# Patient Record
Sex: MALE | Race: WHITE | NOT HISPANIC OR LATINO | Employment: FULL TIME | URBAN - METROPOLITAN AREA
[De-identification: names, ages, dates, MRNs, and addresses within clinical notes are randomized per-mention and may not be internally consistent; named-entity substitution may affect disease eponyms.]

---

## 2017-01-09 ENCOUNTER — TRANSFERRED RECORDS (OUTPATIENT)
Dept: HEALTH INFORMATION MANAGEMENT | Facility: CLINIC | Age: 62
End: 2017-01-09

## 2017-01-23 ENCOUNTER — OFFICE VISIT (OUTPATIENT)
Dept: FAMILY MEDICINE | Facility: CLINIC | Age: 62
End: 2017-01-23
Payer: COMMERCIAL

## 2017-01-23 VITALS
HEART RATE: 84 BPM | WEIGHT: 136 LBS | HEIGHT: 68 IN | OXYGEN SATURATION: 99 % | DIASTOLIC BLOOD PRESSURE: 76 MMHG | SYSTOLIC BLOOD PRESSURE: 118 MMHG | BODY MASS INDEX: 20.61 KG/M2 | TEMPERATURE: 97.6 F

## 2017-01-23 DIAGNOSIS — Z12.5 SCREENING FOR PROSTATE CANCER: ICD-10-CM

## 2017-01-23 DIAGNOSIS — E11.9 TYPE 2 DIABETES MELLITUS WITHOUT COMPLICATION, UNSPECIFIED LONG TERM INSULIN USE STATUS: ICD-10-CM

## 2017-01-23 DIAGNOSIS — Z23 NEED FOR PROPHYLACTIC VACCINATION AGAINST STREPTOCOCCUS PNEUMONIAE (PNEUMOCOCCUS): ICD-10-CM

## 2017-01-23 DIAGNOSIS — Z23 NEEDS FLU SHOT: ICD-10-CM

## 2017-01-23 DIAGNOSIS — C18.7 MALIGNANT NEOPLASM OF SIGMOID COLON (H): ICD-10-CM

## 2017-01-23 DIAGNOSIS — Z23 NEED FOR PROPHYLACTIC VACCINATION AND INOCULATION AGAINST INFLUENZA: ICD-10-CM

## 2017-01-23 DIAGNOSIS — Z00.00 ROUTINE GENERAL MEDICAL EXAMINATION AT A HEALTH CARE FACILITY: Primary | ICD-10-CM

## 2017-01-23 LAB
CHOLEST SERPL-MCNC: 101 MG/DL
CREAT UR-MCNC: 51 MG/DL
HBA1C MFR BLD: 5.6 % (ref 4.3–6)
HDLC SERPL-MCNC: 33 MG/DL
LDLC SERPL CALC-MCNC: 55 MG/DL
MICROALBUMIN UR-MCNC: <5 MG/L
MICROALBUMIN/CREAT UR: NORMAL MG/G CR (ref 0–17)
NONHDLC SERPL-MCNC: 68 MG/DL
PSA SERPL-ACNC: 1.1 UG/L (ref 0–4)
TRIGL SERPL-MCNC: 67 MG/DL

## 2017-01-23 PROCEDURE — 90472 IMMUNIZATION ADMIN EACH ADD: CPT | Performed by: FAMILY MEDICINE

## 2017-01-23 PROCEDURE — 83036 HEMOGLOBIN GLYCOSYLATED A1C: CPT | Performed by: FAMILY MEDICINE

## 2017-01-23 PROCEDURE — 82043 UR ALBUMIN QUANTITATIVE: CPT | Performed by: FAMILY MEDICINE

## 2017-01-23 PROCEDURE — 36415 COLL VENOUS BLD VENIPUNCTURE: CPT | Performed by: FAMILY MEDICINE

## 2017-01-23 PROCEDURE — 80061 LIPID PANEL: CPT | Performed by: FAMILY MEDICINE

## 2017-01-23 PROCEDURE — 90686 IIV4 VACC NO PRSV 0.5 ML IM: CPT | Performed by: FAMILY MEDICINE

## 2017-01-23 PROCEDURE — 99396 PREV VISIT EST AGE 40-64: CPT | Mod: 25 | Performed by: FAMILY MEDICINE

## 2017-01-23 PROCEDURE — G0103 PSA SCREENING: HCPCS | Performed by: FAMILY MEDICINE

## 2017-01-23 PROCEDURE — 90471 IMMUNIZATION ADMIN: CPT | Performed by: FAMILY MEDICINE

## 2017-01-23 PROCEDURE — 90732 PPSV23 VACC 2 YRS+ SUBQ/IM: CPT | Performed by: FAMILY MEDICINE

## 2017-01-23 NOTE — MR AVS SNAPSHOT
After Visit Summary   1/23/2017    Cipriano Parry    MRN: [id number]           Patient Information     Date Of Birth          1955        Visit Information        Provider Department      1/23/2017 9:00 AM Albino Venegas MD [facility]        Today's Diagnoses     Routine general medical examination at a health care facility    -  1     Malignant neoplasm of sigmoid colon (H)         Type 2 diabetes mellitus without complication, unspecified long term insulin use status (H)         Need for prophylactic vaccination and inoculation against influenza         Screening for prostate cancer         Need for prophylactic vaccination against Streptococcus pneumoniae (pneumococcus)         Needs flu shot           Care Instructions      Preventive Health Recommendations  Male Ages 50 - 64    Yearly exam:             See your health care provider every year in order to  o   Review health changes.   o   Discuss preventive care.    o   Review your medicines if your doctor has prescribed any.     Have a cholesterol test every 5 years, or more frequently if you are at risk for high cholesterol/heart disease.     Have a diabetes test (fasting glucose) every three years. If you are at risk for diabetes, you should have this test more often.     Have a colonoscopy at age 50, or have a yearly FIT test (stool test). These exams will check for colon cancer.      Talk with your health care provider about whether or not a prostate cancer screening test (PSA) is right for you.    You should be tested each year for STDs (sexually transmitted diseases), if you re at risk.     Shots: Get a flu shot each year. Get a tetanus shot every 10 years.     Nutrition:    Eat at least 5 servings of fruits and vegetables daily.     Eat whole-grain bread, whole-wheat pasta and brown rice instead of white grains and rice.     Talk to your provider about Calcium and Vitamin D.     Lifestyle    Exercise for at  least 150 minutes a week (30 minutes a day, 5 days a week). This will help you control your weight and prevent disease.     Limit alcohol to one drink per day.     No smoking.     Wear sunscreen to prevent skin cancer.     See your dentist every six months for an exam and cleaning.     See your eye doctor every 1 to 2 years.            Follow-ups after your visit        Your next 10 appointments already scheduled     Jan 27, 2017  8:00 AM   Office Visit with Albino Venegas MD   [facility]    [address] [place] [id number] [phone]           Bring a current list of meds and any records pertaining to this visit.  For Physicals, please bring immunization records and any forms needing to be filled out.  Please arrive 10 minutes early to complete paperwork.              Who to contact     If you have questions or need follow up information about today's clinic visit or your schedule please contact [facility] directly at [phone].  Normal or non-critical lab and imaging results will be communicated to you by [employer], letter or phone within 4 business days after the clinic has received the results. If you do not hear from us within 7 days, please contact the clinic through [employer] or phone. If you have a critical or abnormal lab result, we will notify you by phone as soon as possible.  Submit refill requests through [employer] or call your pharmacy and they will forward the refill request to us. Please allow 3 business days for your refill to be completed.          Additional Information About Your Visit        [employer] Information     [employer] gives you secure access to your electronic health record. If you see a primary care provider, you can also send messages to your care team and make appointments. If you have questions, please call your primary care clinic.  If you do not have a primary care provider, please call [phone]  "and they will assist you.        Care EveryWhere ID     This is your Care EveryWhere ID. This could be used by other organizations to access your [facility] medical records  [id number]        Your Vitals Were     Pulse Temperature Height BMI (Body Mass Index) Pulse Oximetry       84 97.6  F (36.4  C) (Oral) 5' 8\" (1.727 m) 20.68 kg/m2 99%        Blood Pressure from Last 3 Encounters:   01/23/17 118/76   11/29/16 125/83   11/20/16 114/75    Weight from Last 3 Encounters:   01/23/17 136 lb (61.689 kg)   11/29/16 132 lb 11.2 oz (60.192 kg)   11/18/16 136 lb 9.6 oz (61.961 kg)              We Performed the Following     Albumin Random Urine Quantitative     FLU VAC, SPLIT VIRUS IM > 3 YO (QUADRIVALENT) [62580]     HC FLU VAC PRESRV FREE QUAD SPLIT VIR 3+YRS IM     Hemoglobin A1c     Lipid panel reflex to direct LDL     PNEUMOVOCCAL VACCINE 23 VALENT (PNEUMOVAX 23)     PSA, screen     Vaccine Administration, Initial [36022]          Today's Medication Changes          These changes are accurate as of: 1/23/17  9:39 AM.  If you have any questions, ask your nurse or doctor.               These medicines have changed or have updated prescriptions.        Dose/Directions    lisinopril 5 MG tablet   Commonly known as:  PRINIVIL/ZESTRIL   This may have changed:  additional instructions   Used for:  Type 2 diabetes mellitus with hyperglycemia (H)        Dose:  5 mg   Take 1 tablet (5 mg) by mouth daily   Quantity:  90 tablet   Refills:  1       metFORMIN 500 MG 24 hr tablet   Commonly known as:  GLUCOPHAGE-XR   This may have changed:    - how much to take  - additional instructions   Used for:  Type 2 diabetes mellitus with hyperglycemia (H)        Dose:  1000 mg   Take 2 tablets (1,000 mg) by mouth daily (with dinner)   Quantity:  180 tablet   Refills:  1       simvastatin 10 MG tablet   Commonly known as:  ZOCOR   This may have changed:  when to take this   Used for:  Type 2 diabetes mellitus with hyperglycemia (H)        " Dose:  10 mg   Take 1 tablet (10 mg) by mouth At Bedtime   Quantity:  90 tablet   Refills:  1                Primary Care Provider Office Phone # Fax #    Albino Sue Venegas [phone]       [place] [address] [address] [place] [id number]        Thank you!     Thank you for choosing [facility]  for your care. Our goal is always to provide you with excellent care. Hearing back from our patients is one way we can continue to improve our services. Please take a few minutes to complete the written survey that you may receive in the mail after your visit with us. Thank you!             Your Updated Medication List - Protect others around you: Learn how to safely use, store and throw away your medicines at www.disposemymeds.org.          This list is accurate as of: 1/23/17  9:39 AM.  Always use your most recent med list.                   Brand Name Dispense Instructions for use    acetaminophen 500 MG tablet    TYLENOL     Take 2 tablets (1,000 mg) by mouth every 8 hours       CO Q 10 PO      Take 100 mg by mouth daily AM       FISH OIL PEARLS 300 MG Caps      Take 300 mg by mouth daily AM       lisinopril 5 MG tablet    PRINIVIL/ZESTRIL    90 tablet    Take 1 tablet (5 mg) by mouth daily       metFORMIN 500 MG 24 hr tablet    GLUCOPHAGE-XR    180 tablet    Take 2 tablets (1,000 mg) by mouth daily (with dinner)       ondansetron 4 MG tablet    ZOFRAN    3 tablet    Take 1 tablet (4 mg) by mouth every 6 hours as needed for nausea when taking Neomycin and Flagyl.       simvastatin 10 MG tablet    ZOCOR    90 tablet    Take 1 tablet (10 mg) by mouth At Bedtime

## 2017-01-23 NOTE — PROGRESS NOTES
SUBJECTIVE:     CC: Cipriano Parry is an 61 year old male who presents for preventative health visit.     Physical  Annual:     Getting at least 3 servings of Calcium per day::  Yes    Bi-annual eye exam::  Yes    Dental care twice a year::  NO    Sleep apnea or symptoms of sleep apnea::  None    Frequency of exercise::  2-3 days/week    Duration of exercise::  30-45 minutes    Taking medications regularly::  Yes    Medication side effects::  None    Additional concerns today::  No      Today's PHQ-2 Score:   PHQ-2 ( 1999 Pfizer) 1/23/2017   Q1: Little interest or pleasure in doing things -   Q2: Feeling down, depressed or hopeless -   PHQ-2 Score -   Little interest or pleasure in doing things Not at all   Feeling down, depressed or hopeless Not at all   PHQ-2 Score 0     Abuse: Current or Past(Physical, Sexual or Emotional)- No  Do you feel safe in your environment - Yes    Social History   Substance Use Topics     Smoking status: Never Smoker      Smokeless tobacco: Not on file     Alcohol Use: Yes      Comment: 0-1 drinks per week      The patient does not drink >3 drinks per day nor >7 drinks per week.    Last PSA:   PSA   Date Value Ref Range Status   12/30/2015 0.85 0 - 4 ug/L Final       Recent Labs   Lab Test  02/01/16   1456  12/30/15   1028   CHOL  169  151   HDL  36*  28*   LDL  96  75   TRIG  186*  241*   NHDL  133*  123       Reviewed orders with patient. Reviewed health maintenance and updated orders accordingly - Yes    All Histories reviewed and updated in Epic.    Right side of scalp - bump not healing well.    Colon cancer - stage 1. S/p surgery.       ROS:  C: NEGATIVE for fever, chills, change in weight  I: NEGATIVE for worrisome rashes, moles or lesions  E: NEGATIVE for vision changes or irritation  ENT: NEGATIVE for ear, mouth and throat problems  R: NEGATIVE for significant cough or SOB  CV: NEGATIVE for chest pain, palpitations or peripheral edema  GI: NEGATIVE for nausea, abdominal pain,  "heartburn, or change in bowel habits   male: negative for dysuria, hematuria, decreased urinary stream, erectile dysfunction, urethral discharge  M: NEGATIVE for significant arthralgias or myalgia  N: NEGATIVE for weakness, dizziness or paresthesias  E: NEGATIVE for temperature intolerance, skin/hair changes  P: NEGATIVE for changes in mood or affect    Problem list, Medication list, Allergies, and Medical/Social/Surgical histories reviewed in [facility] and updated as appropriate.  OBJECTIVE:     /76 mmHg  Pulse 84  Temp(Src) 97.6  F (36.4  C) (Oral)  Ht 5' 8\" (1.727 m)  Wt 136 lb (61.689 kg)  BMI 20.68 kg/m2  SpO2 99%  EXAM:  GENERAL: healthy, alert and no distress  EYES: Eyes grossly normal to inspection, PERRL and conjunctivae and sclerae normal  HENT: ear canals and TM's normal, nose and mouth without ulcers or lesions  NECK: no adenopathy, no asymmetry, masses, or scars and thyroid normal to palpation  RESP: lungs clear to auscultation - no rales, rhonchi or wheezes  CV: regular rate and rhythm, normal S1 S2, no S3 or S4, no murmur, click or rub, no peripheral edema and peripheral pulses strong  ABDOMEN: soft, nontender, no hepatosplenomegaly, no masses and bowel sounds normal   (male): normal male genitalia without lesions or urethral discharge, no hernia  MS: no gross musculoskeletal defects noted, no edema  SKIN: right temple around 0.5 cm x 08 cm erythematous raised papular lesion  NEURO: Normal strength and tone, mentation intact and speech normal  PSYCH: mentation appears normal, affect normal/bright    ASSESSMENT/PLAN:     1. Routine general medical examination at a health care facility       2. Malignant neoplasm of sigmoid colon (H)  S/p resection. Doing well.    3. Type 2 diabetes mellitus without complication, unspecified long term insulin use status (H)   under good control. Offered to cut down meds. He wishes to stay on same for now.   - Lipid panel reflex to direct LDL  - Hemoglobin " "A1c  - Albumin Random Urine Quantitative  - PNEUMOVOCCAL VACCINE 23 VALENT (PNEUMOVAX 23)    4. Need for prophylactic vaccination and inoculation against influenza     - Vaccine Administration, Initial [31164]    5. Screening for prostate cancer     - PSA, screen    6. Need for prophylactic vaccination against Streptococcus pneumoniae (pneumococcus)     - PNEUMOVOCCAL VACCINE 23 VALENT (PNEUMOVAX 23)  - EA ADD'L VACCINE    7. Needs flu shot     - HC FLU VAC PRESRV FREE QUAD SPLIT VIR 3+YRS IM    Will come back for skin biopsy.     COUNSELING:   Reviewed preventive health counseling, as reflected in patient instructions  Special attention given to:        Regular exercise       Healthy diet/nutrition       Vision screening       Hearing screening       Prostate cancer screening         reports that he has never smoked. He does not have any smokeless tobacco history on file.    Estimated body mass index is 20.18 kg/(m^2) as calculated from the following:    Height as of 11/29/16: 5' 8\" (1.727 m).    Weight as of 11/29/16: 132 lb 11.2 oz (60.192 kg).       Counseling Resources:  ATP IV Guidelines  Pooled Cohorts Equation Calculator  FRAX Risk Assessment  ICSI Preventive Guidelines  Dietary Guidelines for Americans, 2010  Factual's MyPlate  ASA Prophylaxis  Lung CA Screening    Albino Venegas MD  [facility]  Injectable Influenza Immunization Documentation    1.  Is the person to be vaccinated sick today?  No    2. Does the person to be vaccinated have an allergy to eggs or to a component of the vaccine?  No    3. Has the person to be vaccinated today ever had a serious reaction to influenza vaccine in the past?  No    4. Has the person to be vaccinated ever had Guillain-Newport syndrome?  No     Form completed by Sue Gutierrez CMA      "

## 2017-01-23 NOTE — NURSING NOTE
"Chief Complaint   Patient presents with     Physical     pt is not fasting        Initial /76 mmHg  Pulse 84  Temp(Src) 97.6  F (36.4  C) (Oral)  Ht 5' 8\" (1.727 m)  Wt 136 lb (61.689 kg)  BMI 20.68 kg/m2  SpO2 99% Estimated body mass index is 20.68 kg/(m^2) as calculated from the following:    Height as of this encounter: 5' 8\" (1.727 m).    Weight as of this encounter: 136 lb (61.689 kg).  BP completed using cuff size: erick Gutierrez CMA      "

## 2017-01-23 NOTE — PATIENT INSTRUCTIONS
Preventive Health Recommendations  Male Ages 50 - 64    Yearly exam:             See your health care provider every year in order to  o   Review health changes.   o   Discuss preventive care.    o   Review your medicines if your doctor has prescribed any.     Have a cholesterol test every 5 years, or more frequently if you are at risk for high cholesterol/heart disease.     Have a diabetes test (fasting glucose) every three years. If you are at risk for diabetes, you should have this test more often.     Have a colonoscopy at age 50, or have a yearly FIT test (stool test). These exams will check for colon cancer.      Talk with your health care provider about whether or not a prostate cancer screening test (PSA) is right for you.    You should be tested each year for STDs (sexually transmitted diseases), if you re at risk.     Shots: Get a flu shot each year. Get a tetanus shot every 10 years.     Nutrition:    Eat at least 5 servings of fruits and vegetables daily.     Eat whole-grain bread, whole-wheat pasta and brown rice instead of white grains and rice.     Talk to your provider about Calcium and Vitamin D.     Lifestyle    Exercise for at least 150 minutes a week (30 minutes a day, 5 days a week). This will help you control your weight and prevent disease.     Limit alcohol to one drink per day.     No smoking.     Wear sunscreen to prevent skin cancer.     See your dentist every six months for an exam and cleaning.     See your eye doctor every 1 to 2 years.

## 2017-01-27 ENCOUNTER — OFFICE VISIT (OUTPATIENT)
Dept: FAMILY MEDICINE | Facility: CLINIC | Age: 62
End: 2017-01-27
Payer: COMMERCIAL

## 2017-01-27 VITALS
DIASTOLIC BLOOD PRESSURE: 76 MMHG | HEIGHT: 68 IN | HEART RATE: 74 BPM | SYSTOLIC BLOOD PRESSURE: 122 MMHG | TEMPERATURE: 98.3 F | BODY MASS INDEX: 21.14 KG/M2 | OXYGEN SATURATION: 99 % | WEIGHT: 139.5 LBS

## 2017-01-27 DIAGNOSIS — L81.9 ATYPICAL PIGMENTED SKIN LESION: Primary | ICD-10-CM

## 2017-01-27 PROCEDURE — 88305 TISSUE EXAM BY PATHOLOGIST: CPT | Performed by: FAMILY MEDICINE

## 2017-01-27 PROCEDURE — 11100 HC BIOPSY SKIN/SUBQ/MUC MEM, SINGLE LESION: CPT | Performed by: FAMILY MEDICINE

## 2017-01-27 NOTE — PROGRESS NOTES
"  SUBJECTIVE:                                                    Cipriano Parry is a 61 year old male who presents to clinic today for the following health issues:      Shave Biopsy      Onset: 3 months     Description (location/number): 1 on right temple     Accompanying signs and symptoms: Painful: no    Therapies tried and outcome: none       /76 mmHg  Pulse 74  Temp(Src) 98.3  F (36.8  C) (Oral)  Ht 5' 8\" (1.727 m)  Wt 139 lb 8 oz (63.277 kg)  BMI 21.22 kg/m2  SpO2 99%  Skin - right temple  Around 0.5 cm x 0.8 cm erythemtaous papular lesion with small dark scab in center.    Risk and benefit of procedure discussed. Discussed need of excisional biopsy if pathology report is abnormal. Patient understood and agreed. We discussed complications of procedure and written consent signed.  Area was cleaned with alcohol and with anaesthetized with 1%lidocaine with epi. After adequate anaesthesia, lesion was shaved in typical fashion with derma blade. Bleeding was stopped with pressure, drysol. Dressing with topical antibiotics applied. Patient tolerated procedure well. EBL minimal.     (L81.9) Atypical pigmented skin lesion  (primary encounter diagnosis)  Comment: with concern of basal cell ca. Will do biopsy. discussed needs to see dermatologist if biopsy is abnormal.   Plan: BIOPSY SKIN/SUBQ/MUC MEM, SINGLE LESION,         Surgical pathology exam               "

## 2017-01-27 NOTE — NURSING NOTE
"Chief Complaint   Patient presents with     Other     shave biopsy       Initial /76 mmHg  Pulse 74  Temp(Src) 98.3  F (36.8  C) (Oral)  Ht 5' 8\" (1.727 m)  Wt 139 lb 8 oz (63.277 kg)  BMI 21.22 kg/m2  SpO2 99% Estimated body mass index is 21.22 kg/(m^2) as calculated from the following:    Height as of this encounter: 5' 8\" (1.727 m).    Weight as of this encounter: 139 lb 8 oz (63.277 kg).  BP completed using cuff size: erick Gutierrez CMA      "

## 2017-01-30 ENCOUNTER — TELEPHONE (OUTPATIENT)
Dept: SURGERY | Facility: CLINIC | Age: 62
End: 2017-01-30

## 2017-01-30 LAB — COPATH REPORT: NORMAL

## 2017-01-30 NOTE — TELEPHONE ENCOUNTER
Patient left a message stating his wife has an appointment on Monday, and cannot make the scheduled appointment with Dr. Don.   Patient states he would be available the following Monday for an appointment.  Patient is rescheduled to see Dr. Blue 2/13/17 at 12:30 pm.  Called patient and left a message with rescheduled date/time.  Provided my direct number for  additional questions or concerns.

## 2017-01-30 NOTE — TELEPHONE ENCOUNTER
Per the request of Dr. Spivey, patient is scheduled to see Dr. Don this upcoming Monday at 8:30 am.  Left message for patient with appointment details.  Asked patient to confirm.  Provided my direct number.

## 2017-01-31 ENCOUNTER — TELEPHONE (OUTPATIENT)
Dept: FAMILY MEDICINE | Facility: CLINIC | Age: 62
End: 2017-01-31

## 2017-01-31 DIAGNOSIS — C44.310 BCC (BASAL CELL CARCINOMA), FACE: Primary | ICD-10-CM

## 2017-01-31 NOTE — TELEPHONE ENCOUNTER
Please inform patient -   Shave biopsy result showed basal cell carcinoma as suspected.  He should see dermatologist.  Referral signed. May take some time getting in to see a dermatologist. He should start working on it right now.

## 2017-01-31 NOTE — TELEPHONE ENCOUNTER
lmtcb for result.   Try- May be able to get in sooner.  N: [facility] [phone]   [email or web address]  UNIQUE Garcia

## 2017-02-08 DIAGNOSIS — E11.9 TYPE 2 DIABETES MELLITUS WITHOUT COMPLICATION, UNSPECIFIED LONG TERM INSULIN USE STATUS: Primary | ICD-10-CM

## 2017-02-08 RX ORDER — METFORMIN HCL 500 MG
1000 TABLET, EXTENDED RELEASE 24 HR ORAL
Qty: 180 TABLET | Refills: 0 | Status: SHIPPED | OUTPATIENT
Start: 2017-02-08 | End: 2017-06-05

## 2017-02-08 NOTE — TELEPHONE ENCOUNTER
Dr. Venegas,    Patient has been taking Metformin 500mg tabs (2 in the morning) with breakfast. The refill states to take 2 Metformin with dinner. Does it matter if he takes Metformin in the morning with dinner vs in the morning with breakfast?    Thanks! Shanice Villareal RN

## 2017-02-08 NOTE — TELEPHONE ENCOUNTER
Routing refill request to provider for review/approval because:  Provider to review directions - patient reports taking differently      Thank you,  Elise Nguyen RN

## 2017-02-08 NOTE — TELEPHONE ENCOUNTER
Called and LVM for pt to give us a call back to clarify the directions on how he is taking the metformin.     Linda Gomez MA

## 2017-02-08 NOTE — TELEPHONE ENCOUNTER
Metformin          Last Written Prescription Date: 8/8/16  Last Fill Quantity: 180, # refills: 1  Last Office Visit with [facility] or [facility] prescribing provider:  1/27/17        BP Readings from Last 3 Encounters:   01/27/17 122/76   01/23/17 118/76   11/29/16 125/83     MICROL       <5   1/23/2017  No results found for this basename: microalbumin  CREATININE   Date Value Ref Range Status   11/17/2016 0.67 0.66 - 1.25 mg/dL Final   ]  GFR ESTIMATE   Date Value Ref Range Status   11/17/2016 >90  Non  GFR Calc   >60 mL/min/1.7m2 Final   11/11/2016 >90  Non  GFR Calc   >60 mL/min/1.7m2 Final   11/09/2016 86 >60 mL/min/1.7m2 Final     GFR ESTIMATE IF BLACK   Date Value Ref Range Status   11/17/2016 >90   GFR Calc   >60 mL/min/1.7m2 Final   11/11/2016 >90   GFR Calc   >60 mL/min/1.7m2 Final   11/09/2016 >90 >60 mL/min/1.7m2 Final     CHOL      101   1/23/2017  HDL       33   1/23/2017  LDL       55   1/23/2017  TRIG       67   1/23/2017  No results found for this basename: cholhdlratio  AST       25   11/11/2016  ALT       33   11/11/2016  A1C      5.6   1/23/2017  A1C      5.8   11/15/2016  A1C      5.7   8/8/2016  A1C      8.7   3/7/2016  A1C     12.1   2/1/2016  POTASSIUM   Date Value Ref Range Status   11/18/2016 4.8 3.4 - 5.3 mmol/L Final     Comment:     Specimen slightly hemolyzed, potassium may be falsely elevated

## 2017-02-13 ENCOUNTER — ONCOLOGY VISIT (OUTPATIENT)
Dept: ONCOLOGY | Facility: CLINIC | Age: 62
End: 2017-02-13
Attending: INTERNAL MEDICINE
Payer: COMMERCIAL

## 2017-02-13 VITALS
HEIGHT: 68 IN | SYSTOLIC BLOOD PRESSURE: 132 MMHG | OXYGEN SATURATION: 95 % | HEART RATE: 76 BPM | BODY MASS INDEX: 21.49 KG/M2 | WEIGHT: 141.8 LBS | TEMPERATURE: 97.7 F | RESPIRATION RATE: 18 BRPM | DIASTOLIC BLOOD PRESSURE: 87 MMHG

## 2017-02-13 DIAGNOSIS — C18.7 MALIGNANT NEOPLASM OF SIGMOID COLON (H): Primary | ICD-10-CM

## 2017-02-13 LAB
ALBUMIN SERPL-MCNC: 4.1 G/DL (ref 3.4–5)
ALP SERPL-CCNC: 64 U/L (ref 40–150)
ALT SERPL W P-5'-P-CCNC: 32 U/L (ref 0–70)
ANION GAP SERPL CALCULATED.3IONS-SCNC: 10 MMOL/L (ref 3–14)
AST SERPL W P-5'-P-CCNC: 24 U/L (ref 0–45)
BASOPHILS # BLD AUTO: 0 10E9/L (ref 0–0.2)
BASOPHILS NFR BLD AUTO: 0.4 %
BILIRUB SERPL-MCNC: 0.6 MG/DL (ref 0.2–1.3)
BUN SERPL-MCNC: 12 MG/DL (ref 7–30)
CALCIUM SERPL-MCNC: 9.3 MG/DL (ref 8.5–10.1)
CEA SERPL-MCNC: 1.9 UG/L (ref 0–2.5)
CHLORIDE SERPL-SCNC: 106 MMOL/L (ref 94–109)
CO2 SERPL-SCNC: 27 MMOL/L (ref 20–32)
CREAT SERPL-MCNC: 0.75 MG/DL (ref 0.66–1.25)
DIFFERENTIAL METHOD BLD: NORMAL
EOSINOPHIL # BLD AUTO: 0.2 10E9/L (ref 0–0.7)
EOSINOPHIL NFR BLD AUTO: 1.7 %
ERYTHROCYTE [DISTWIDTH] IN BLOOD BY AUTOMATED COUNT: 13.2 % (ref 10–15)
GFR SERPL CREATININE-BSD FRML MDRD: NORMAL ML/MIN/1.7M2
GLUCOSE SERPL-MCNC: 93 MG/DL (ref 70–99)
HCT VFR BLD AUTO: 44.2 % (ref 40–53)
HGB BLD-MCNC: 15.1 G/DL (ref 13.3–17.7)
IMM GRANULOCYTES # BLD: 0 10E9/L (ref 0–0.4)
IMM GRANULOCYTES NFR BLD: 0.2 %
LYMPHOCYTES # BLD AUTO: 5.1 10E9/L (ref 0.8–5.3)
LYMPHOCYTES NFR BLD AUTO: 50.3 %
MCH RBC QN AUTO: 30 PG (ref 26.5–33)
MCHC RBC AUTO-ENTMCNC: 34.2 G/DL (ref 31.5–36.5)
MCV RBC AUTO: 88 FL (ref 78–100)
MONOCYTES # BLD AUTO: 0.5 10E9/L (ref 0–1.3)
MONOCYTES NFR BLD AUTO: 5.1 %
NEUTROPHILS # BLD AUTO: 4.3 10E9/L (ref 1.6–8.3)
NEUTROPHILS NFR BLD AUTO: 42.3 %
NRBC # BLD AUTO: 0 10*3/UL
NRBC BLD AUTO-RTO: 0 /100
PLATELET # BLD AUTO: 163 10E9/L (ref 150–450)
POTASSIUM SERPL-SCNC: 4.3 MMOL/L (ref 3.4–5.3)
PROT SERPL-MCNC: 7.8 G/DL (ref 6.8–8.8)
RBC # BLD AUTO: 5.03 10E12/L (ref 4.4–5.9)
SODIUM SERPL-SCNC: 143 MMOL/L (ref 133–144)
WBC # BLD AUTO: 10.1 10E9/L (ref 4–11)

## 2017-02-13 PROCEDURE — 36415 COLL VENOUS BLD VENIPUNCTURE: CPT

## 2017-02-13 PROCEDURE — 99212 OFFICE O/P EST SF 10 MIN: CPT | Mod: ZF

## 2017-02-13 PROCEDURE — 99204 OFFICE O/P NEW MOD 45 MIN: CPT | Mod: ZP | Performed by: INTERNAL MEDICINE

## 2017-02-13 PROCEDURE — 85025 COMPLETE CBC W/AUTO DIFF WBC: CPT | Performed by: INTERNAL MEDICINE

## 2017-02-13 PROCEDURE — 80053 COMPREHEN METABOLIC PANEL: CPT | Performed by: INTERNAL MEDICINE

## 2017-02-13 PROCEDURE — 82378 CARCINOEMBRYONIC ANTIGEN: CPT | Performed by: INTERNAL MEDICINE

## 2017-02-13 ASSESSMENT — ENCOUNTER SYMPTOMS
ABDOMINAL PAIN: 0
NAUSEA: 0
VOMITING: 0
POOR WOUND HEALING: 1
BLOATING: 0
CONSTIPATION: 0
RECTAL PAIN: 0
DIARRHEA: 1
RECTAL BLEEDING: 0
NAIL CHANGES: 0
BOWEL INCONTINENCE: 0
JAUNDICE: 0
SKIN CHANGES: 1
BLOOD IN STOOL: 0
HEARTBURN: 0

## 2017-02-13 NOTE — LETTER
2/13/2017       RE: Cipriano Parry  [address] [address] [address] [facility] [address]     Dear Colleague,    Thank you for referring your patient, Cipriano Parry, to the [facility]. Please see a copy of my visit note below.    [facility]    Hematology/Oncology New Patient Note      Today's Date: 02/13/17    Reason for Consult: rectosigmoid carcinoma      HISTORY OF PRESENT ILLNESS: Cipriano Parry is a 61 year old male who presents with rectosigmoid cancer.  He underwent laparoscopic-assisted low anterior resection by Dr. Spivey on 11/16/16.  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  Normal mismatch repair protein expression.      He has 4 sisters, one of whom has uterine cancer.  He has no family history of colorectal cancer.  He has no biological children.  He lives with his wife in [facility].  He has no smoking history.  He rarely drinks alcohol.  No illicit drug use.  He works repairing computers.      He currently feels well.  His bowel movements have normalized.  Appetite is good, but has been slowly gaining weight.  He has diabetes, which is under good control now with diet, exercise, and Metformin.  He remains active; he likes to kayak and ride his bike.      He recently had a basal cell carcinoma removed from his right temple, and follows with dermatology.      REVIEW OF SYSTEMS:   General Symptoms: No  Skin Symptoms: Yes  HENT Symptoms: No  EYE SYMPTOMS: No  HEART SYMPTOMS: No  LUNG SYMPTOMS: No  INTESTINAL SYMPTOMS: Yes  URINARY SYMPTOMS: No  REPRODUCTIVE SYMPTOMS: No  SKELETAL SYMPTOMS: No  BLOOD SYMPTOMS: No  NERVOUS SYSTEM SYMPTOMS: No  MENTAL HEALTH SYMPTOMS: No  Changes in hair: No  Changes in moles/birth marks: Yes  Itching: No  Rashes: No  Changes in nails: No  Acne: No  Change  in facial hair: No  Warts: Yes  Non-healing sores: Yes  Scarring: No  Flaking of skin: No  Color changes of hands/feet in cold : No  Sun sensitivity: No  Skin thickening: No  Heart burn or indigestion: No  Nausea: No  Vomiting: No  Abdominal pain: No  Bloating: No  Constipation: No  Diarrhea: Yes  Blood in stool: No  Black stools: No  Rectal or Anal pain: No  Fecal incontinence: No  Rectal bleeding: No  Yellowing of skin or eyes: No  Vomit with blood: No  Change in stools: No  Hemorrhoids: No          HOME MEDICATIONS:  Current Outpatient Prescriptions   Medication Sig Dispense Refill     metFORMIN (GLUCOPHAGE-XR) 500 MG 24 hr tablet Take 2 tablets (1,000 mg) by mouth daily (with breakfast) 180 tablet 0     acetaminophen (TYLENOL) 500 MG tablet Take 2 tablets (1,000 mg) by mouth every 8 hours       Omega-3 Fatty Acids (FISH OIL PEARLS) 300 MG CAPS Take 300 mg by mouth daily AM       ondansetron (ZOFRAN) 4 MG tablet Take 1 tablet (4 mg) by mouth every 6 hours as needed for nausea when taking Neomycin and Flagyl. 3 tablet 0     lisinopril (PRINIVIL,ZESTRIL) 5 MG tablet Take 1 tablet (5 mg) by mouth daily (Patient taking differently: Take 5 mg by mouth daily AM) 90 tablet 1     simvastatin (ZOCOR) 10 MG tablet Take 1 tablet (10 mg) by mouth At Bedtime (Patient taking differently: Take 10 mg by mouth daily ) 90 tablet 1         ALLERGIES:  Allergies   Allergen Reactions     Penicillin G Rash         PAST MEDICAL HISTORY:  Past Medical History   Diagnosis Date     Diabetes (H)      HTN (hypertension)      Malignant neoplasm of sigmoid colon (H)          PAST SURGICAL HISTORY:  Past Surgical History   Procedure Laterality Date     As removal of tonsils,12+ y/o       Colonoscopy       Sigmoidoscopy flexible N/A 11/16/2016     Procedure: SIGMOIDOSCOPY FLEXIBLE;  Surgeon: Oneil Spivey MD;  Location: [facility] OR     Laparoscopic colectomy low anterior N/A 11/16/2016     Procedure: LAPAROSCOPIC COLECTOMY LOW ANTERIOR;  Surgeon:  "Oneil Spivey MD;  Location:  OR         SOCIAL HISTORY:  Social History     Social History     Marital status:      Spouse name: N/A     Number of children: N/A     Years of education: N/A     Occupational History     Not on file.     Social History Main Topics     Smoking status: Never Smoker     Smokeless tobacco: Never Used     Alcohol use 0.0 oz/week     0 Standard drinks or equivalent per week      Comment: 0-1 drinks per week      Drug use: No     Sexual activity: Yes     Partners: Female     Birth control/ protection: None     Other Topics Concern     Parent/Sibling W/ Cabg, Mi Or Angioplasty Before 65f 55m? No     Social History Narrative         FAMILY HISTORY:  Family History   Problem Relation Age of Onset     Other Cancer Sister      Other Cancer Sister          PHYSICAL EXAM:  Vital signs:  /87 (BP Location: Left arm, Patient Position: Chair, Cuff Size: Adult Regular)  Pulse 76  Temp 97.7  F (36.5  C) (Oral)  Resp 18  Ht 1.727 m (5' 8\")  Wt 64.3 kg (141 lb 12.8 oz)  SpO2 95%  BMI 21.56 kg/m2   ECO  GENERAL/CONSTITUTIONAL: No acute distress.  EYES: No scleral icterus.  LYMPH: No anterior cervical, posterior cervical, or supraclavicular adenopathy.   RESPIRATORY: Clear to auscultation bilaterally. No crackles or wheezing.   CARDIOVASCULAR: Regular rate and rhythm without murmurs, gallops, or rubs.  GASTROINTESTINAL: No tenderness. The patient has normal bowel sounds. No guarding.  No distention.  MUSCULOSKELETAL: Warm and well-perfused, no cyanosis, clubbing, or edema.  NEUROLOGIC: Alert, oriented, answers questions appropriately.  INTEGUMENTARY: No rashes or jaundice.  GAIT: Steady, does not use assistive device      LABS:  CBC RESULTS:   Recent Labs   Lab Test  17   1357   WBC  10.1   RBC  5.03   HGB  15.1   HCT  44.2   MCV  88   MCH  30.0   MCHC  34.2   RDW  13.2   PLT  163     Recent Labs   Lab Test  17   1357  16   0701  16   0759   NA  143   --   132* "   POTASSIUM  4.3  4.8  4.0   CHLORIDE  106   --   98   CO2  27   --   25   ANIONGAP  10   --   9   GLC  93   --   149*   BUN  12   --   9   CR  0.75   --   0.67   BRISSA  9.3   --   8.1*     Lab Results   Component Value Date    AST 24 02/13/2017     Lab Results   Component Value Date    ALT 32 02/13/2017     No results found for: BILICONJ   Lab Results   Component Value Date    BILITOTAL 0.6 02/13/2017     Lab Results   Component Value Date    ALBUMIN 4.1 02/13/2017     Lab Results   Component Value Date    PROTTOTAL 7.8 02/13/2017      Lab Results   Component Value Date    ALKPHOS 64 02/13/2017         PATHOLOGY:  11/16/16:  FINAL DIAGNOSIS:   A. RECTUM AND SIGMOID, LOW ANTERIOR RESECTION:   - Invasive adenocarcinoma, low-grade (moderately differentiated)   - Tumor Size: 1.6 x 1.1 x 0.3 cm   - Microscopic Tumor Extension: Tumor invades submucosa   - Tumor Budding: Present, high degree (>=10 buds per 20X field)   - Surgical Margins: Uninvolved by invasive carcinoma   - Lymph Nodes: 16 Lymph nodes negative for metastatic carcinoma (0/16)   - Pathologic Stage: pT1 N0 MX     B. ANASTOMOSIS RINGS:   - Colon with no significant histologic abnormality   - Negative for malignancy     Report Name: Colon and Rectum - Resection   Status: Submitted     Part(s) Involved:   A: Rectum and sigmoid     Synoptic Report:     SPECIMEN     Specimen:         - Sigmoid colon         - Rectum     Procedure:         - Low anterior resection     TUMOR     Primary Tumor Site:         - Rectum     Histologic Type:         - Adenocarcinoma     Histologic Grade:         - Low-grade (well differentiated to moderately differentiated)     Tumor Size: 1.6 x 1.1 x 0.3 cm     Non-teena Tumor Deposits:         - Not identified     Multiple Primary Sites:         - No additional site(s) present     Tumor Extent       Site(s) of Direct Extent of Tumor:           - Rectum       Microscopic Tumor Extension:           - Tumor invades submucosa         Tumor  Budding:             - Present, high degree (>=10 buds per 20X field)       Macroscopic Tumor Perforation:           - Not identified     Accessory Tumor Findings       Lymph-Vascular Invasion:           - Not identified       Perineural Invasion:           - Not identified     MARGINS     Proximal Margin:         - Uninvolved by invasive carcinoma     Distal Margin:         - Uninvolved by invasive carcinoma       Distance of Tumor From Margin: 4.1 cm     Circumferential (Radial) Margin:         - Uninvolved by invasive carcinoma       Distance of Tumor from Margin: 1.1 cm     Mesenteric Margin:         - Not applicable     LYMPH NODES     Number of Lymph Nodes Examined: 16     Number of Lymph Nodes Involved: 0     STAGE (PTNM)     Primary Tumor (pT):         - pT1: Tumor invades submucosa     Regional Lymph Nodes (pN):         - pN0: No regional lymph node metastasis     ADDITIONAL FINDINGS     Additional Pathologic Findings:         Tattoo adjacent to tumor       IMAGING:  CT c/a/p 11/9/16:  IMPRESSION: In this patient with biopsy-proven invasive adenocarcinoma  of the transverse colon and sigmoid colon:  1. No definite large bowel mass visualized, likely due to the  disc-like morphology of the sigmoid carcinoma as described on  colonoscopy report 10/24/16. The cancerous 4 mm sessile polyp in the  transverse colon was resected. There is no suspicious lymphadenopathy  in the abdomen or pelvis.  2. 6 mm hypodensity in hepatic segment 5 and 4 mm left mid renal pole  hypodensity, too small to accurately characterize, but likely cysts.  3. Prominent 11mm precarinal lymph node and sub-4 mm pulmonary  nodules. Recommend continued attention on follow up.      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  Normal mismatch repair protein expression.    We discussed the pathology.  Adjuvant chemotherapy is not recommended in a stage I tumor, and prognosis should be good.  We discussed surveillance going forward.  We will have him do labs and CEA today.    -return to clinic in May 2017 with CT scans, labs, and follow-up visit for H&P  -he has flex sig with Dr. Spivey coming up in April/May 2017  -colonoscopy in 1 year from surgery (November 2017)        I spent a total of 45 minutes with the patient, with over >50% of the time in counseling and/or coordination of care.         Meenakshi Blue MD  Hematology/Oncology  [facility]

## 2017-02-13 NOTE — PROGRESS NOTES
[facility]    Hematology/Oncology New Patient Note      Today's Date: 02/13/17    Reason for Consult: rectosigmoid carcinoma      HISTORY OF PRESENT ILLNESS: Cipriano Parry is a 61 year old male who presents with rectosigmoid cancer.  He underwent laparoscopic-assisted low anterior resection by Dr. Spivey on 11/16/16.  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  Normal mismatch repair protein expression.      He has 4 sisters, one of whom has uterine cancer.  He has no family history of colorectal cancer.  He has no biological children.  He lives with his wife in [facility].  He has no smoking history.  He rarely drinks alcohol.  No illicit drug use.  He works repairing computers.      He currently feels well.  His bowel movements have normalized.  Appetite is good, but has been slowly gaining weight.  He has diabetes, which is under good control now with diet, exercise, and Metformin.  He remains active; he likes to kayak and ride his bike.      He recently had a basal cell carcinoma removed from his right temple, and follows with dermatology.      REVIEW OF SYSTEMS:   General Symptoms: No  Skin Symptoms: Yes  HENT Symptoms: No  EYE SYMPTOMS: No  HEART SYMPTOMS: No  LUNG SYMPTOMS: No  INTESTINAL SYMPTOMS: Yes  URINARY SYMPTOMS: No  REPRODUCTIVE SYMPTOMS: No  SKELETAL SYMPTOMS: No  BLOOD SYMPTOMS: No  NERVOUS SYSTEM SYMPTOMS: No  MENTAL HEALTH SYMPTOMS: No  Changes in hair: No  Changes in moles/birth marks: Yes  Itching: No  Rashes: No  Changes in nails: No  Acne: No  Change in facial hair: No  Warts: Yes  Non-healing sores: Yes  Scarring: No  Flaking of skin: No  Color changes of hands/feet in cold : No  Sun sensitivity: No  Skin thickening: No  Heart burn or indigestion: No  Nausea: No  Vomiting: No  Abdominal  pain: No  Bloating: No  Constipation: No  Diarrhea: Yes  Blood in stool: No  Black stools: No  Rectal or Anal pain: No  Fecal incontinence: No  Rectal bleeding: No  Yellowing of skin or eyes: No  Vomit with blood: No  Change in stools: No  Hemorrhoids: No          HOME MEDICATIONS:  Current Outpatient Prescriptions   Medication Sig Dispense Refill     metFORMIN (GLUCOPHAGE-XR) 500 MG 24 hr tablet Take 2 tablets (1,000 mg) by mouth daily (with breakfast) 180 tablet 0     acetaminophen (TYLENOL) 500 MG tablet Take 2 tablets (1,000 mg) by mouth every 8 hours       Omega-3 Fatty Acids (FISH OIL PEARLS) 300 MG CAPS Take 300 mg by mouth daily AM       ondansetron (ZOFRAN) 4 MG tablet Take 1 tablet (4 mg) by mouth every 6 hours as needed for nausea when taking Neomycin and Flagyl. 3 tablet 0     lisinopril (PRINIVIL,ZESTRIL) 5 MG tablet Take 1 tablet (5 mg) by mouth daily (Patient taking differently: Take 5 mg by mouth daily AM) 90 tablet 1     simvastatin (ZOCOR) 10 MG tablet Take 1 tablet (10 mg) by mouth At Bedtime (Patient taking differently: Take 10 mg by mouth daily ) 90 tablet 1         ALLERGIES:  Allergies   Allergen Reactions     Penicillin G Rash         PAST MEDICAL HISTORY:  Past Medical History   Diagnosis Date     Diabetes (H)      HTN (hypertension)      Malignant neoplasm of sigmoid colon (H)          PAST SURGICAL HISTORY:  Past Surgical History   Procedure Laterality Date     As removal of tonsils,12+ y/o       Colonoscopy       Sigmoidoscopy flexible N/A 11/16/2016     Procedure: SIGMOIDOSCOPY FLEXIBLE;  Surgeon: Oneil Spivey MD;  Location:  OR     Laparoscopic colectomy low anterior N/A 11/16/2016     Procedure: LAPAROSCOPIC COLECTOMY LOW ANTERIOR;  Surgeon: Oneil Spivey MD;  Location:  OR         SOCIAL HISTORY:  Social History     Social History     Marital status:      Spouse name: N/A     Number of children: N/A     Years of education: N/A     Occupational History     Not on file.  "    Social History Main Topics     Smoking status: Never Smoker     Smokeless tobacco: Never Used     Alcohol use 0.0 oz/week     0 Standard drinks or equivalent per week      Comment: 0-1 drinks per week      Drug use: No     Sexual activity: Yes     Partners: Female     Birth control/ protection: None     Other Topics Concern     Parent/Sibling W/ Cabg, Mi Or Angioplasty Before 65f 55m? No     Social History Narrative         FAMILY HISTORY:  Family History   Problem Relation Age of Onset     Other Cancer Sister      Other Cancer Sister          PHYSICAL EXAM:  Vital signs:  /87 (BP Location: Left arm, Patient Position: Chair, Cuff Size: Adult Regular)  Pulse 76  Temp 97.7  F (36.5  C) (Oral)  Resp 18  Ht 1.727 m (5' 8\")  Wt 64.3 kg (141 lb 12.8 oz)  SpO2 95%  BMI 21.56 kg/m2   ECO  GENERAL/CONSTITUTIONAL: No acute distress.  EYES: No scleral icterus.  LYMPH: No anterior cervical, posterior cervical, or supraclavicular adenopathy.   RESPIRATORY: Clear to auscultation bilaterally. No crackles or wheezing.   CARDIOVASCULAR: Regular rate and rhythm without murmurs, gallops, or rubs.  GASTROINTESTINAL: No tenderness. The patient has normal bowel sounds. No guarding.  No distention.  MUSCULOSKELETAL: Warm and well-perfused, no cyanosis, clubbing, or edema.  NEUROLOGIC: Alert, oriented, answers questions appropriately.  INTEGUMENTARY: No rashes or jaundice.  GAIT: Steady, does not use assistive device      LABS:  CBC RESULTS:   Recent Labs   Lab Test  17   1357   WBC  10.1   RBC  5.03   HGB  15.1   HCT  44.2   MCV  88   MCH  30.0   MCHC  34.2   RDW  13.2   PLT  163     Recent Labs   Lab Test  17   1357  16   0701  16   0759   NA  143   --   132*   POTASSIUM  4.3  4.8  4.0   CHLORIDE  106   --   98   CO2  27   --   25   ANIONGAP  10   --   9   GLC  93   --   149*   BUN  12   --   9   CR  0.75   --   0.67   BRISSA  9.3   --   8.1*     Lab Results   Component Value Date    AST 24 2017 "     Lab Results   Component Value Date    ALT 32 02/13/2017     No results found for: BILICONJ   Lab Results   Component Value Date    BILITOTAL 0.6 02/13/2017     Lab Results   Component Value Date    ALBUMIN 4.1 02/13/2017     Lab Results   Component Value Date    PROTTOTAL 7.8 02/13/2017      Lab Results   Component Value Date    ALKPHOS 64 02/13/2017         PATHOLOGY:  11/16/16:  FINAL DIAGNOSIS:   A. RECTUM AND SIGMOID, LOW ANTERIOR RESECTION:   - Invasive adenocarcinoma, low-grade (moderately differentiated)   - Tumor Size: 1.6 x 1.1 x 0.3 cm   - Microscopic Tumor Extension: Tumor invades submucosa   - Tumor Budding: Present, high degree (>=10 buds per 20X field)   - Surgical Margins: Uninvolved by invasive carcinoma   - Lymph Nodes: 16 Lymph nodes negative for metastatic carcinoma (0/16)   - Pathologic Stage: pT1 N0 MX     B. ANASTOMOSIS RINGS:   - Colon with no significant histologic abnormality   - Negative for malignancy     Report Name: Colon and Rectum - Resection   Status: Submitted     Part(s) Involved:   A: Rectum and sigmoid     Synoptic Report:     SPECIMEN     Specimen:         - Sigmoid colon         - Rectum     Procedure:         - Low anterior resection     TUMOR     Primary Tumor Site:         - Rectum     Histologic Type:         - Adenocarcinoma     Histologic Grade:         - Low-grade (well differentiated to moderately differentiated)     Tumor Size: 1.6 x 1.1 x 0.3 cm     Non-teena Tumor Deposits:         - Not identified     Multiple Primary Sites:         - No additional site(s) present     Tumor Extent       Site(s) of Direct Extent of Tumor:           - Rectum       Microscopic Tumor Extension:           - Tumor invades submucosa         Tumor Budding:             - Present, high degree (>=10 buds per 20X field)       Macroscopic Tumor Perforation:           - Not identified     Accessory Tumor Findings       Lymph-Vascular Invasion:           - Not identified       Perineural  Invasion:           - Not identified     MARGINS     Proximal Margin:         - Uninvolved by invasive carcinoma     Distal Margin:         - Uninvolved by invasive carcinoma       Distance of Tumor From Margin: 4.1 cm     Circumferential (Radial) Margin:         - Uninvolved by invasive carcinoma       Distance of Tumor from Margin: 1.1 cm     Mesenteric Margin:         - Not applicable     LYMPH NODES     Number of Lymph Nodes Examined: 16     Number of Lymph Nodes Involved: 0     STAGE (PTNM)     Primary Tumor (pT):         - pT1: Tumor invades submucosa     Regional Lymph Nodes (pN):         - pN0: No regional lymph node metastasis     ADDITIONAL FINDINGS     Additional Pathologic Findings:         Tattoo adjacent to tumor       IMAGING:  CT c/a/p 11/9/16:  IMPRESSION: In this patient with biopsy-proven invasive adenocarcinoma  of the transverse colon and sigmoid colon:  1. No definite large bowel mass visualized, likely due to the  disc-like morphology of the sigmoid carcinoma as described on  colonoscopy report 10/24/16. The cancerous 4 mm sessile polyp in the  transverse colon was resected. There is no suspicious lymphadenopathy  in the abdomen or pelvis.  2. 6 mm hypodensity in hepatic segment 5 and 4 mm left mid renal pole  hypodensity, too small to accurately characterize, but likely cysts.  3. Prominent 11mm precarinal lymph node and sub-4 mm pulmonary  nodules. Recommend continued attention on follow up.      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  Normal mismatch repair protein expression.    We discussed the pathology.  Adjuvant chemotherapy is not recommended in a  stage I tumor, and prognosis should be good.  We discussed surveillance going forward.  We will have him do labs and CEA today.    -return to clinic in May 2017 with CT scans, labs, and follow-up visit for H&P  -he has flex sig with Dr. Spivey coming up in April/May 2017  -colonoscopy in 1 year from surgery (November 2017)        I spent a total of 45 minutes with the patient, with over >50% of the time in counseling and/or coordination of care.         Meenakshi Blue MD  Hematology/Oncology  [facility]

## 2017-02-13 NOTE — NURSING NOTE
"Cipriano Parry is a 61 year old male who presents for:  Chief Complaint   Patient presents with     Oncology Clinic Visit     Pt is here due to having colon surgery        Initial Vitals:  /87 (BP Location: Left arm, Patient Position: Chair, Cuff Size: Adult Regular)  Pulse 76  Temp 97.7  F (36.5  C) (Oral)  Resp 18  Ht 1.727 m (5' 8\")  Wt 64.3 kg (141 lb 12.8 oz)  SpO2 95%  BMI 21.56 kg/m2 Estimated body mass index is 21.56 kg/(m^2) as calculated from the following:    Height as of this encounter: 1.727 m (5' 8\").    Weight as of this encounter: 64.3 kg (141 lb 12.8 oz).. Body surface area is 1.76 meters squared. BP completed using cuff size: regular  Data Unavailable No LMP for male patient. Allergies and medications reviewed.     Medications: Medication refills not needed today.  Pharmacy name entered into [facility]: [id number] - [place], [place] - [address] AT [facility]    Comments:     6 minutes for nursing intake (face to face time)   Mallorie Soriano CMA        "

## 2017-02-13 NOTE — MR AVS SNAPSHOT
After Visit Summary   2/13/2017    Cipriano Parry    MRN: [id number]           Patient Information     Date Of Birth          1955        Visit Information        Provider Department      2/13/2017 12:45 PM Meenakshi Blue MD [facility]        Today's Diagnoses     Malignant neoplasm of sigmoid colon (H)    -  1       Follow-ups after your visit        Your next 10 appointments already scheduled     Feb 13, 2017  1:45 PM CST   Masonic Lab Draw with  MASONIC LAB DRAW   [facility] Masonic Lab Draw ([facility])    [address] [place] [id number]   [phone]            May 22, 2017 10:30 AM CDT   Masonic Lab Draw with  MASONIC LAB DRAW   [facility] Masonic Lab Draw ([facility])    [address] [place] [id number]   [phone]            May 22, 2017 11:00 AM CDT   (Arrive by 10:45 AM)   CT CHEST/ABDOMEN/PELVIS W CONTRAST with UCCT2   [facility] ([facility])    [address] [place] [id number]   [phone]           Please bring any scans or X-rays taken at other hospitals, if similar tests were done. Also bring a list of your medicines, including vitamins, minerals and over-the-counter drugs. It is safest to leave personal items at home.  Be sure to tell your doctor:   If you have any allergies.   If there s any chance you are pregnant.   If you are breastfeeding.   If you have any special needs.  You may have contrast for this exam. To prepare:   Do not eat or drink for 2 hours before your exam. If you need to take medicine, you may take it with small sips of water. (We may ask you to take liquid medicine as well.)   The day before your exam, drink extra fluids at least six 8-ounce glasses (unless your doctor tells you to restrict your fluids).  Patients over 70 or patients with diabetes or kidney  problems:   If you haven t had a blood test (creatinine test) within the last 30 days, go to your clinic or Diagnostic Imaging Department for this test.  If you have diabetes:   If your kidney function is normal, continue taking your metformin (Avandamet, Glucophage, Glucovance, Metaglip) on the day of your exam.   If your kidney function is abnormal, wait 48 hours before restarting this medicine.  You will have oral contrast for this exam:   You will drink the contrast at home. Get this from your clinic or Diagnostic Imaging Department. Please follow the directions given.  Please wear loose clothing, such as a sweat suit or jogging clothes. Avoid snaps, zippers and other metal. We may ask you to undress and put on a hospital gown.  If you have any questions, please call the Imaging Department where you will have your exam.            May 22, 2017  1:45 PM CDT   (Arrive by 1:30 PM)   Return Visit with Meenakshi Blue MD   [facility] ([facility])    [address] [place] [id number] [phone]              Future tests that were ordered for you today     Open Future Orders        Priority Expected Expires Ordered    CT Chest/Abdomen/Pelvis w Contrast Routine 5/12/2017 2/13/2018 2/13/2017    CBC with platelets differential Routine 5/12/2017 2/13/2018 2/13/2017    Comprehensive metabolic panel Routine 5/12/2017 2/13/2018 2/13/2017    CEA Routine 5/12/2017 2/13/2018 2/13/2017            Who to contact     If you have questions or need follow up information about today's clinic visit or your schedule please contact [facility] directly at [phone].  Normal or non-critical lab and imaging results will be communicated to you by MyChart, letter or phone within 4 business days after the clinic has received the results. If you do not hear from us within 7 days, please contact the clinic through MyChart or phone. If you  "have a critical or abnormal lab result, we will notify you by phone as soon as possible.  Submit refill requests through [employer] or call your pharmacy and they will forward the refill request to us. Please allow 3 business days for your refill to be completed.          Additional Information About Your Visit        D[facility]art Information     [employer] gives you secure access to your electronic health record. If you see a primary care provider, you can also send messages to your care team and make appointments. If you have questions, please call your primary care clinic.  If you do not have a primary care provider, please call [phone] and they will assist you.        Care EveryWhere ID     This is your Care EveryWhere ID. This could be used by other organizations to access your [place] medical records  [id number]        Your Vitals Were     Pulse Temperature Respirations Height Pulse Oximetry BMI (Body Mass Index)    76 97.7  F (36.5  C) (Oral) 18 1.727 m (5' 8\") 95% 21.56 kg/m2       Blood Pressure from Last 3 Encounters:   02/13/17 132/87   01/27/17 122/76   01/23/17 118/76    Weight from Last 3 Encounters:   02/13/17 64.3 kg (141 lb 12.8 oz)   01/27/17 63.3 kg (139 lb 8 oz)   01/23/17 61.7 kg (136 lb)              We Performed the Following     CBC with platelets differential     CEA     Comprehensive metabolic panel          Today's Medication Changes          These changes are accurate as of: 2/13/17  1:29 PM.  If you have any questions, ask your nurse or doctor.               These medicines have changed or have updated prescriptions.        Dose/Directions    lisinopril 5 MG tablet   Commonly known as:  PRINIVIL/ZESTRIL   This may have changed:  additional instructions   Used for:  Type 2 diabetes mellitus with hyperglycemia (H)        Dose:  5 mg   Take 1 tablet (5 mg) by mouth daily   Quantity:  90 tablet   Refills:  1       simvastatin 10 MG tablet   Commonly known as:  ZOCOR   This may have changed:  " when to take this   Used for:  Type 2 diabetes mellitus with hyperglycemia (H)        Dose:  10 mg   Take 1 tablet (10 mg) by mouth At Bedtime   Quantity:  90 tablet   Refills:  1         Stop taking these medicines if you haven't already. Please contact your care team if you have questions.     CO Q 10 PO   Stopped by:  Meenaskhi Blue MD                    Primary Care Provider Office Phone # Fax #    Albino Sue Venegas [phone]       [address] [address]        Thank you!     Thank you for choosing [facility]  for your care. Our goal is always to provide you with excellent care. Hearing back from our patients is one way we can continue to improve our services. Please take a few minutes to complete the written survey that you may receive in the mail after your visit with us. Thank you!             Your Updated Medication List - Protect others around you: Learn how to safely use, store and throw away your medicines at www.disposemymeds.org.          This list is accurate as of: 2/13/17  1:29 PM.  Always use your most recent med list.                   Brand Name Dispense Instructions for use    acetaminophen 500 MG tablet    TYLENOL     Take 2 tablets (1,000 mg) by mouth every 8 hours       FISH OIL PEARLS 300 MG Caps      Take 300 mg by mouth daily AM       lisinopril 5 MG tablet    PRINIVIL/ZESTRIL    90 tablet    Take 1 tablet (5 mg) by mouth daily       metFORMIN 500 MG 24 hr tablet    GLUCOPHAGE-XR    180 tablet    Take 2 tablets (1,000 mg) by mouth daily (with breakfast)       ondansetron 4 MG tablet    ZOFRAN    3 tablet    Take 1 tablet (4 mg) by mouth every 6 hours as needed for nausea when taking Neomycin and Flagyl.       simvastatin 10 MG tablet    ZOCOR    90 tablet    Take 1 tablet (10 mg) by mouth At Bedtime

## 2017-02-28 ENCOUNTER — TELEPHONE (OUTPATIENT)
Dept: SURGERY | Facility: CLINIC | Age: 62
End: 2017-02-28

## 2017-02-28 NOTE — TELEPHONE ENCOUNTER
----- Message from Trisha Ramos sent at 11/30/2016  6:11 PM CST -----  Regarding: Recall  Patient due for flex sig with Dr. Spivey March 2017

## 2017-03-13 ENCOUNTER — TRANSFERRED RECORDS (OUTPATIENT)
Dept: HEALTH INFORMATION MANAGEMENT | Facility: CLINIC | Age: 62
End: 2017-03-13

## 2017-03-16 DIAGNOSIS — E11.65 TYPE 2 DIABETES MELLITUS WITH HYPERGLYCEMIA (H): ICD-10-CM

## 2017-03-16 RX ORDER — SIMVASTATIN 10 MG
TABLET ORAL
Qty: 90 TABLET | Refills: 2 | Status: SHIPPED | OUTPATIENT
Start: 2017-03-16 | End: 2017-10-01

## 2017-03-16 RX ORDER — LISINOPRIL 5 MG/1
TABLET ORAL
Qty: 90 TABLET | Refills: 2 | Status: SHIPPED | OUTPATIENT
Start: 2017-03-16 | End: 2017-10-01

## 2017-03-16 NOTE — TELEPHONE ENCOUNTER
Patient left a message returning my call.  Patient states that he would like to schedule on a Monday, as it's his day off.  Called and spoke with patient.  I explained that Dr. Spivey does colonoscopies on Monday's ,and that we would need to schedule him on a Tuesday.  Patient is scheduled for 4/11/17 at 1:00 pm.  Patient will prep with 2 fleet enemas in clinic.

## 2017-03-16 NOTE — TELEPHONE ENCOUNTER
lisinopril (PRINIVIL,ZESTRIL) 5 MG tablet      Last Written Prescription Date: 8/8/16  Last Fill Quantity: 90, # refills: 1  Last Office Visit with [facility] or [facility] prescribing provider: 1/27/16       Potassium   Date Value Ref Range Status   02/13/2017 4.3 3.4 - 5.3 mmol/L Final     Creatinine   Date Value Ref Range Status   02/13/2017 0.75 0.66 - 1.25 mg/dL Final     BP Readings from Last 3 Encounters:   02/13/17 132/87   01/27/17 122/76   01/23/17 118/76       simvastatin (ZOCOR) 10 MG tablet     Last Written Prescription Date: 8/8/16  Last Fill Quantity: 90, # refills: 1  Last Office Visit with [facility] or [facility] prescribing provider: 1/27/16       Lab Results   Component Value Date    CHOL 101 01/23/2017     Lab Results   Component Value Date    HDL 33 01/23/2017     Lab Results   Component Value Date    LDL 55 01/23/2017     Lab Results   Component Value Date    TRIG 67 01/23/2017     No results found for: ARNULFO

## 2017-03-16 NOTE — TELEPHONE ENCOUNTER
Prescription approved per [facility] Refill Protocol.  MICHAEL Arboleda, BSN, RN

## 2017-03-24 ENCOUNTER — TELEPHONE (OUTPATIENT)
Dept: FAMILY MEDICINE | Facility: CLINIC | Age: 62
End: 2017-03-24

## 2017-04-11 ENCOUNTER — OFFICE VISIT (OUTPATIENT)
Dept: SURGERY | Facility: CLINIC | Age: 62
End: 2017-04-11

## 2017-04-11 VITALS
WEIGHT: 147.1 LBS | BODY MASS INDEX: 22.29 KG/M2 | TEMPERATURE: 97.4 F | OXYGEN SATURATION: 98 % | HEART RATE: 65 BPM | HEIGHT: 68 IN | DIASTOLIC BLOOD PRESSURE: 76 MMHG | SYSTOLIC BLOOD PRESSURE: 120 MMHG

## 2017-04-11 DIAGNOSIS — C18.7 CANCER OF SIGMOID COLON (H): Primary | ICD-10-CM

## 2017-04-11 ASSESSMENT — PAIN SCALES - GENERAL: PAINLEVEL: NO PAIN (0)

## 2017-04-11 NOTE — MR AVS SNAPSHOT
After Visit Summary   4/11/2017    Cipriano Parry    MRN: [id number]           Patient Information     Date Of Birth          1955        Visit Information        Provider Department      4/11/2017 1:00 PM Oneil Spivey MD [facility] Colon and Rectal Surgery        Today's Diagnoses     Cancer of sigmoid colon (H)    -  1       Follow-ups after your visit        Your next 10 appointments already scheduled     May 22, 2017 10:30 AM CDT   Masonic Lab Draw with  MASONIC LAB DRAW   [facility] Masonic Lab Draw ([facility])    [address] [place] [id number] [phone]            May 22, 2017 11:00 AM CDT   (Arrive by 10:45 AM)   CT CHEST/ABDOMEN/PELVIS W CONTRAST with UCCT2   [facility] CT ([facility])    [address] [place] [id number] [phone]           Please bring any scans or X-rays taken at other hospitals, if similar tests were done. Also bring a list of your medicines, including vitamins, minerals and over-the-counter drugs. It is safest to leave personal items at home.  Be sure to tell your doctor:   If you have any allergies.   If there s any chance you are pregnant.   If you are breastfeeding.   If you have any special needs.  You may have contrast for this exam. To prepare:   Do not eat or drink for 2 hours before your exam. If you need to take medicine, you may take it with small sips of water. (We may ask you to take liquid medicine as well.)   The day before your exam, drink extra fluids at least six 8-ounce glasses (unless your doctor tells you to restrict your fluids).  Patients over 70 or patients with diabetes or kidney problems:   If you haven t had a blood test (creatinine test) within the last 30 days, go to your clinic or Diagnostic Imaging Department for this test.  If you have diabetes:   If your kidney function is normal, continue taking your metformin  (Avandamet, Glucophage, Glucovance, Metaglip) on the day of your exam.   If your kidney function is abnormal, wait 48 hours before restarting this medicine.  You will have oral contrast for this exam:   You will drink the contrast at home. Get this from your clinic or Diagnostic Imaging Department. Please follow the directions given.  Please wear loose clothing, such as a sweat suit or jogging clothes. Avoid snaps, zippers and other metal. We may ask you to undress and put on a hospital gown.  If you have any questions, please call the Imaging Department where you will have your exam.            May 22, 2017  1:45 PM CDT   (Arrive by 1:30 PM)   Return Visit with Meenakshi Blue MD   [facility] ([facility])    [address] [place] [address] [phone]              Who to contact     Please call your clinic at [phone] to:    Ask questions about your health    Make or cancel appointments    Discuss your medicines    Learn about your test results    Speak to your doctor   If you have compliments or concerns about an experience at your clinic, or if you wish to file a complaint, please contact [facility] Patient Relations at [phone] or email us at [email or web address]         Additional Information About Your Visit        MyChart Information     [employer] gives you secure access to your electronic health record. If you see a primary care provider, you can also send messages to your care team and make appointments. If you have questions, please call your primary care clinic.  If you do not have a primary care provider, please call [phone] and they will assist you.      [employer] is an electronic gateway that provides easy, online access to your medical records. With [employer], you can request a clinic appointment, read your test results, renew a prescription or communicate with your care  "team.     To access your existing account, please contact your [facility] or call [phone] for assistance.        Care EveryWhere ID     This is your Care EveryWhere ID. This could be used by other organizations to access your [place] medical records  [id number]        Your Vitals Were     Pulse Temperature Height Pulse Oximetry BMI (Body Mass Index)       65 97.4  F (36.3  C) (Oral) 1.727 m (5' 8\") 98% 22.37 kg/m2        Blood Pressure from Last 3 Encounters:   No data found for BP    Weight from Last 3 Encounters:   No data found for Wt              Today, you had the following     No orders found for display       Primary Care Provider Office Phone # Fax #    Albino Sue Venegas [phone]       [address] [address]        Thank you!     Thank you for choosing [facility] COLON AND RECTAL SURGERY  for your care. Our goal is always to provide you with excellent care. Hearing back from our patients is one way we can continue to improve our services. Please take a few minutes to complete the written survey that you may receive in the mail after your visit with us. Thank you!             Your Updated Medication List - Protect others around you: Learn how to safely use, store and throw away your medicines at www.disposemymeds.org.          This list is accurate as of: 4/11/17 11:59 PM.  Always use your most recent med list.                   Brand Name Dispense Instructions for use    acetaminophen 500 MG tablet    TYLENOL     Take 2 tablets (1,000 mg) by mouth every 8 hours       lisinopril 5 MG tablet    PRINIVIL/ZESTRIL    90 tablet    TAKE 1 TABLET (5 MG) BY MOUTH DAILY       metFORMIN 500 MG 24 hr tablet    GLUCOPHAGE-XR    180 tablet    Take 2 tablets (1,000 mg) by mouth daily (with breakfast)       MULTIVITAMIN ADULT PO          simvastatin 10 MG tablet    ZOCOR    90 tablet    TAKE 1 TABLET (10 MG) BY MOUTH AT " BEDTIME       VITAMIN D (CHOLECALCIFEROL) PO      Take by mouth daily

## 2017-04-11 NOTE — LETTER
2017       RE: Cipriano Parry  [address]  [facility] [address]     Dear Colleague,    Thank you for referring your patient, Cipriano Parry, to the [facility] COLON AND RECTAL SURGERY at [facility]. Please see a copy of my visit note below.    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  He does have a sister who had uterine cancer, but his mismatch repair protein expression was normal.  He continues his oncologic followup with Dr. Meenakshi Blue, who advised against adjuvant therapy.  He is asymptomatic.  His last CEA was 1.9 on 2017.  He returns for repeat sigmoidoscopy.      I obtained written informed consent.      PHYSICAL EXAMINATION:  Digital rectal examination was normal.  The prostate was normal.  Sigmoidoscopy was performed to about 50 cm.  There is a well-healed anastomosis at 10 cm.  There is no evidence of tumor recurrence.  The remaining bowel appears very healthy.      Cipriano tolerated this examination very comfortably.  He will need a colonoscopy about a year from his original surgery, roughly 6 months from now.  He understands and plans to schedule this.  Given his anterior resection, I will plan annual flexible sigmoidoscopies just to reassess the anastomosis for the next several years.      I answered all of Cipriano's questions to his stated satisfaction.  He expresses understanding and agreement with the proposed plan.      Total time 20 minutes, greater than half the time spent in counseling.      Oneil Spivey MD      cc:   Meenakshi Blue MD   [address] [address]           For details of past medical history, surgical history, family history, medications,  allergies, and review of systems, please see details below.    Medical history:  Past Medical History:   Diagnosis Date     Diabetes (H)      HTN (hypertension)      Malignant neoplasm of sigmoid colon (H)        Surgical history:  Past Surgical History:   Procedure Laterality Date     AS REMOVAL OF TONSILS,12+ Y/O       COLONOSCOPY       LAPAROSCOPIC COLECTOMY LOW ANTERIOR N/A 11/16/2016    Procedure: LAPAROSCOPIC COLECTOMY LOW ANTERIOR;  Surgeon: Oneil Spivey MD;  Location: [facility] OR     SIGMOIDOSCOPY FLEXIBLE N/A 11/16/2016    Procedure: SIGMOIDOSCOPY FLEXIBLE;  Surgeon: Oneil Spivey MD;  Location: [facility] OR       Family history:  Family History   Problem Relation Age of Onset     Other Cancer Sister      Other Cancer Sister        Medications:  Current Outpatient Prescriptions   Medication Sig Dispense Refill     Multiple Vitamins-Minerals (MULTIVITAMIN ADULT PO)        VITAMIN D, CHOLECALCIFEROL, PO Take by mouth daily       lisinopril (PRINIVIL/ZESTRIL) 5 MG tablet TAKE 1 TABLET (5 MG) BY MOUTH DAILY 90 tablet 2     simvastatin (ZOCOR) 10 MG tablet TAKE 1 TABLET (10 MG) BY MOUTH AT BEDTIME 90 tablet 2     metFORMIN (GLUCOPHAGE-XR) 500 MG 24 hr tablet Take 2 tablets (1,000 mg) by mouth daily (with breakfast) 180 tablet 0     acetaminophen (TYLENOL) 500 MG tablet Take 2 tablets (1,000 mg) by mouth every 8 hours (Patient not taking: Reported on 4/11/2017)       Allergies:  The patientis allergic to penicillin g.    Social history:  Social History   Substance Use Topics     Smoking status: Never Smoker     Smokeless tobacco: Never Used     Alcohol use 0.0 oz/week     0 Standard drinks or equivalent per week      Comment: 0-1 drinks per week      Marital status: .    Review of Systems:  Nursing Notes:   Rebel Vega LPN  4/11/2017 12:24 PM  Signed  Chief Complaint   Patient presents with     Clinic Care Coordination - Follow-up     Flex sig procedure today.        Vitals:    04/11/17 1219   BP: 120/76   BP  "Location: Left arm   Patient Position: Chair   Cuff Size: Adult Regular   Pulse: 65   Temp: 97.4  F (36.3  C)   TempSrc: Oral   SpO2: 98%   Weight: 66.7 kg (147 lb 1.6 oz)   Height: 1.727 m (5' 8\")       Body mass index is 22.37 kg/(m^2).    Emelyn MACHADO, LPN                                 Oneil Spivey MD   Professor and Chief  Division of Colon and Rectal Surgery  [facility]      Referring Provider:  Oneil Spivey MD   [facility]ANS  [address] [facility] [address]  [place] [address]     Primary Care Provider:  Albino Venegas         Again, thank you for allowing me to participate in the care of your patient.      Sincerely,    Oneil Spivey MD      "

## 2017-04-11 NOTE — NURSING NOTE
"Chief Complaint   Patient presents with     Clinic Care Coordination - Follow-up     Flex sig procedure today.        Vitals:    04/11/17 1219   BP: 120/76   BP Location: Left arm   Patient Position: Chair   Cuff Size: Adult Regular   Pulse: 65   Temp: 97.4  F (36.3  C)   TempSrc: Oral   SpO2: 98%   Weight: 66.7 kg (147 lb 1.6 oz)   Height: 1.727 m (5' 8\")       Body mass index is 22.37 kg/(m^2).    Emelyn MACHADO LPN                          "

## 2017-04-11 NOTE — PROGRESS NOTES
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  He does have a sister who had uterine cancer, but his mismatch repair protein expression was normal.  He continues his oncologic followup with Dr. Meenakshi Blue, who advised against adjuvant therapy.  He is asymptomatic.  His last CEA was 1.9 on 2017.  He returns for repeat sigmoidoscopy.      I obtained written informed consent.      PHYSICAL EXAMINATION:  Digital rectal examination was normal.  The prostate was normal.  Sigmoidoscopy was performed to about 50 cm.  There is a well-healed anastomosis at 10 cm.  There is no evidence of tumor recurrence.  The remaining bowel appears very healthy.      Cipriano tolerated this examination very comfortably.  He will need a colonoscopy about a year from his original surgery, roughly 6 months from now.  He understands and plans to schedule this.  Given his anterior resection, I will plan annual flexible sigmoidoscopies just to reassess the anastomosis for the next several years.      I answered all of Cipriano's questions to his stated satisfaction.  He expresses understanding and agreement with the proposed plan.      Total time 20 minutes, greater than half the time spent in counseling.      Oneil Spivey MD      cc:   Meenakshi Blue MD   [address] [address]           For details of past medical history, surgical history, family history, medications, allergies, and review of systems, please see details below.    Medical history:  Past Medical History:   Diagnosis Date     Diabetes (H)      HTN (hypertension)      Malignant neoplasm of sigmoid colon (H)        Surgical history:  Past Surgical History:   Procedure Laterality Date     AS REMOVAL OF  "TONSILS,12+ Y/O       COLONOSCOPY       LAPAROSCOPIC COLECTOMY LOW ANTERIOR N/A 11/16/2016    Procedure: LAPAROSCOPIC COLECTOMY LOW ANTERIOR;  Surgeon: Oneil Spivey MD;  Location: [facility]     SIGMOIDOSCOPY FLEXIBLE N/A 11/16/2016    Procedure: SIGMOIDOSCOPY FLEXIBLE;  Surgeon: Oneil Spivey MD;  Location: [facility] OR       Family history:  Family History   Problem Relation Age of Onset     Other Cancer Sister      Other Cancer Sister        Medications:  Current Outpatient Prescriptions   Medication Sig Dispense Refill     Multiple Vitamins-Minerals (MULTIVITAMIN ADULT PO)        VITAMIN D, CHOLECALCIFEROL, PO Take by mouth daily       lisinopril (PRINIVIL/ZESTRIL) 5 MG tablet TAKE 1 TABLET (5 MG) BY MOUTH DAILY 90 tablet 2     simvastatin (ZOCOR) 10 MG tablet TAKE 1 TABLET (10 MG) BY MOUTH AT BEDTIME 90 tablet 2     metFORMIN (GLUCOPHAGE-XR) 500 MG 24 hr tablet Take 2 tablets (1,000 mg) by mouth daily (with breakfast) 180 tablet 0     acetaminophen (TYLENOL) 500 MG tablet Take 2 tablets (1,000 mg) by mouth every 8 hours (Patient not taking: Reported on 4/11/2017)       Allergies:  The patientis allergic to penicillin g.    Social history:  Social History   Substance Use Topics     Smoking status: Never Smoker     Smokeless tobacco: Never Used     Alcohol use 0.0 oz/week     0 Standard drinks or equivalent per week      Comment: 0-1 drinks per week      Marital status: .    Review of Systems:  Nursing Notes:   Rebel Vega LPN  4/11/2017 12:24 PM  Signed  Chief Complaint   Patient presents with     Clinic Care Coordination - Follow-up     Flex sig procedure today.        Vitals:    04/11/17 1219   BP: 120/76   BP Location: Left arm   Patient Position: Chair   Cuff Size: Adult Regular   Pulse: 65   Temp: 97.4  F (36.3  C)   TempSrc: Oral   SpO2: 98%   Weight: 66.7 kg (147 lb 1.6 oz)   Height: 1.727 m (5' 8\")       Body mass index is 22.37 kg/(m^2).    AYESHA Dexter" MD Allyssa   Professor and Chief  Division of Colon and Rectal Surgery  [facility]      Referring Provider:  Oneil Spivey MD   PHYSICIANS  [address] [facility] [address]  [place] [address]     Primary Care Provider:  Albino Venegas

## 2017-05-22 ENCOUNTER — ONCOLOGY VISIT (OUTPATIENT)
Dept: ONCOLOGY | Facility: CLINIC | Age: 62
End: 2017-05-22
Attending: INTERNAL MEDICINE
Payer: COMMERCIAL

## 2017-05-22 ENCOUNTER — APPOINTMENT (OUTPATIENT)
Dept: LAB | Facility: CLINIC | Age: 62
End: 2017-05-22
Attending: INTERNAL MEDICINE
Payer: COMMERCIAL

## 2017-05-22 VITALS
SYSTOLIC BLOOD PRESSURE: 122 MMHG | WEIGHT: 148.8 LBS | HEART RATE: 71 BPM | TEMPERATURE: 98.4 F | RESPIRATION RATE: 16 BRPM | BODY MASS INDEX: 22.55 KG/M2 | DIASTOLIC BLOOD PRESSURE: 74 MMHG | HEIGHT: 68 IN | OXYGEN SATURATION: 94 %

## 2017-05-22 DIAGNOSIS — C18.7 MALIGNANT NEOPLASM OF SIGMOID COLON (H): ICD-10-CM

## 2017-05-22 LAB
ALBUMIN SERPL-MCNC: 4.2 G/DL (ref 3.4–5)
ALP SERPL-CCNC: 62 U/L (ref 40–150)
ALT SERPL W P-5'-P-CCNC: 39 U/L (ref 0–70)
ANION GAP SERPL CALCULATED.3IONS-SCNC: 9 MMOL/L (ref 3–14)
AST SERPL W P-5'-P-CCNC: 37 U/L (ref 0–45)
BASOPHILS # BLD AUTO: 0 10E9/L (ref 0–0.2)
BASOPHILS NFR BLD AUTO: 0.4 %
BILIRUB SERPL-MCNC: 0.8 MG/DL (ref 0.2–1.3)
BUN SERPL-MCNC: 13 MG/DL (ref 7–30)
CALCIUM SERPL-MCNC: 9.5 MG/DL (ref 8.5–10.1)
CEA SERPL-MCNC: 1.9 UG/L (ref 0–2.5)
CHLORIDE SERPL-SCNC: 106 MMOL/L (ref 94–109)
CO2 SERPL-SCNC: 26 MMOL/L (ref 20–32)
CREAT SERPL-MCNC: 0.82 MG/DL (ref 0.66–1.25)
DIFFERENTIAL METHOD BLD: ABNORMAL
EOSINOPHIL # BLD AUTO: 0.1 10E9/L (ref 0–0.7)
EOSINOPHIL NFR BLD AUTO: 1.3 %
ERYTHROCYTE [DISTWIDTH] IN BLOOD BY AUTOMATED COUNT: 13 % (ref 10–15)
GFR SERPL CREATININE-BSD FRML MDRD: ABNORMAL ML/MIN/1.7M2
GLUCOSE SERPL-MCNC: 105 MG/DL (ref 70–99)
HCT VFR BLD AUTO: 44.6 % (ref 40–53)
HGB BLD-MCNC: 15.5 G/DL (ref 13.3–17.7)
IMM GRANULOCYTES # BLD: 0 10E9/L (ref 0–0.4)
IMM GRANULOCYTES NFR BLD: 0.2 %
LYMPHOCYTES # BLD AUTO: 5.6 10E9/L (ref 0.8–5.3)
LYMPHOCYTES NFR BLD AUTO: 51 %
MCH RBC QN AUTO: 31.1 PG (ref 26.5–33)
MCHC RBC AUTO-ENTMCNC: 34.8 G/DL (ref 31.5–36.5)
MCV RBC AUTO: 89 FL (ref 78–100)
MONOCYTES # BLD AUTO: 0.5 10E9/L (ref 0–1.3)
MONOCYTES NFR BLD AUTO: 4.5 %
NEUTROPHILS # BLD AUTO: 4.7 10E9/L (ref 1.6–8.3)
NEUTROPHILS NFR BLD AUTO: 42.6 %
NRBC # BLD AUTO: 0 10*3/UL
NRBC BLD AUTO-RTO: 0 /100
PLATELET # BLD AUTO: 173 10E9/L (ref 150–450)
POTASSIUM SERPL-SCNC: 3.9 MMOL/L (ref 3.4–5.3)
PROT SERPL-MCNC: 8 G/DL (ref 6.8–8.8)
RBC # BLD AUTO: 4.99 10E12/L (ref 4.4–5.9)
RBC MORPH BLD: NORMAL
SODIUM SERPL-SCNC: 141 MMOL/L (ref 133–144)
WBC # BLD AUTO: 11 10E9/L (ref 4–11)

## 2017-05-22 PROCEDURE — 80053 COMPREHEN METABOLIC PANEL: CPT | Performed by: INTERNAL MEDICINE

## 2017-05-22 PROCEDURE — 85025 COMPLETE CBC W/AUTO DIFF WBC: CPT | Performed by: INTERNAL MEDICINE

## 2017-05-22 PROCEDURE — 82378 CARCINOEMBRYONIC ANTIGEN: CPT | Performed by: INTERNAL MEDICINE

## 2017-05-22 PROCEDURE — 99214 OFFICE O/P EST MOD 30 MIN: CPT | Mod: ZP | Performed by: INTERNAL MEDICINE

## 2017-05-22 PROCEDURE — 99212 OFFICE O/P EST SF 10 MIN: CPT | Mod: ZF

## 2017-05-22 ASSESSMENT — PAIN SCALES - GENERAL: PAINLEVEL: NO PAIN (0)

## 2017-05-22 NOTE — PROGRESS NOTES
[facility]    Hematology/Oncology Established Patient Note      Today's Date: 05/22/17    Reason for Follow-up: rectosigmoid carcinoma      HISTORY OF PRESENT ILLNESS: Cipriano Parry is a 61 year old male who presents with rectosigmoid cancer.  He underwent laparoscopic-assisted low anterior resection by Dr. Spivey on 11/16/16.  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  Normal mismatch repair protein expression.      He had a basal cell carcinoma removed from his right temple, and follows with dermatology.      INTERIM HISTORY: Cipriano comes in for follow-up today.  He says that he feels completely fine.  He denies abdominal pain or nausea/vomiting.  He says that his bowel movements are normal.        REVIEW OF SYSTEMS:   General Symptoms: No  Skin Symptoms: No  HENT Symptoms: No  EYE SYMPTOMS: No  HEART SYMPTOMS: No  LUNG SYMPTOMS: No  INTESTINAL SYMPTOMS: No  URINARY SYMPTOMS: No  REPRODUCTIVE SYMPTOMS: No  SKELETAL SYMPTOMS: No  BLOOD SYMPTOMS: No  NERVOUS SYSTEM SYMPTOMS: No  MENTAL HEALTH SYMPTOMS: No          HOME MEDICATIONS:  Current Outpatient Prescriptions   Medication Sig Dispense Refill     Coenzyme Q10 (CO Q 10 PO)        Multiple Vitamins-Minerals (MULTIVITAMIN ADULT PO)        VITAMIN D, CHOLECALCIFEROL, PO Take by mouth daily       lisinopril (PRINIVIL/ZESTRIL) 5 MG tablet TAKE 1 TABLET (5 MG) BY MOUTH DAILY 90 tablet 2     simvastatin (ZOCOR) 10 MG tablet TAKE 1 TABLET (10 MG) BY MOUTH AT BEDTIME 90 tablet 2     metFORMIN (GLUCOPHAGE-XR) 500 MG 24 hr tablet Take 2 tablets (1,000 mg) by mouth daily (with breakfast) 180 tablet 0         ALLERGIES:  Allergies   Allergen Reactions     Penicillin G Rash         PAST MEDICAL HISTORY:  Past Medical History:   Diagnosis Date     Diabetes (H)      HTN  "(hypertension)      Malignant neoplasm of sigmoid colon (H)          PAST SURGICAL HISTORY:  Past Surgical History:   Procedure Laterality Date     AS REMOVAL OF TONSILS,12+ Y/O       COLONOSCOPY       LAPAROSCOPIC COLECTOMY LOW ANTERIOR N/A 2016    Procedure: LAPAROSCOPIC COLECTOMY LOW ANTERIOR;  Surgeon: Oneil Spivey MD;  [facility]     SIGMOIDOSCOPY FLEXIBLE N/A 2016    Procedure: SIGMOIDOSCOPY FLEXIBLE;  Surgeon: Oneil Spivey MD;  Location: [facility] OR         SOCIAL HISTORY:  Social History     Social History     Marital status:      Spouse name: N/A     Number of children: N/A     Years of education: N/A     Occupational History     Not on file.     Social History Main Topics     Smoking status: Never Smoker     Smokeless tobacco: Never Used     Alcohol use 0.0 oz/week     0 Standard drinks or equivalent per week      Comment: 0-1 drinks per week      Drug use: No     Sexual activity: Yes     Partners: Female     Birth control/ protection: None     Other Topics Concern     Parent/Sibling W/ Cabg, Mi Or Angioplasty Before 65f 55m? No     Social History Narrative     He has 4 sisters, one of whom has uterine cancer.  He has no family history of colorectal cancer.  He has no biological children.  He lives with his wife in [facility].  He has no smoking history.  He rarely drinks alcohol.  No illicit drug use.  He works repairing computers.      FAMILY HISTORY:  Family History   Problem Relation Age of Onset     Other Cancer Sister      Other Cancer Sister          PHYSICAL EXAM:  Vital signs:  /74 (BP Location: Right arm, Patient Position: Chair, Cuff Size: Adult Regular)  Pulse 71  Temp 98.4  F (36.9  C) (Oral)  Resp 16  Ht 1.727 m (5' 8\")  Wt 67.5 kg (148 lb 12.8 oz)  SpO2 94%  BMI 22.62 kg/m2   ECO  GENERAL/CONSTITUTIONAL: No acute distress.  EYES: No scleral icterus.  RESPIRATORY: Clear to auscultation bilaterally. No crackles or wheezing.   CARDIOVASCULAR: " Regular rate and rhythm without murmurs, gallops, or rubs.  GASTROINTESTINAL: No tenderness. The patient has normal bowel sounds. No guarding.  No distention.  MUSCULOSKELETAL: Warm and well-perfused.  NEUROLOGIC: Alert, oriented, answers questions appropriately.  INTEGUMENTARY: No jaundice.      LABS:  CBC RESULTS:   Recent Labs   Lab Test  05/22/17   1100   WBC  11.0   RBC  4.99   HGB  15.5   HCT  44.6   MCV  89   MCH  31.1   MCHC  34.8   RDW  13.0   PLT  173     Recent Labs   Lab Test  05/22/17   1100  02/13/17   1357   NA  141  143   POTASSIUM  3.9  4.3   CHLORIDE  106  106   CO2  26  27   ANIONGAP  9  10   GLC  105*  93   BUN  13  12   CR  0.82  0.75   BRISSA  9.5  9.3     Lab Results   Component Value Date    AST 37 05/22/2017     Lab Results   Component Value Date    ALT 39 05/22/2017     No results found for: BILICONJ   Lab Results   Component Value Date    BILITOTAL 0.8 05/22/2017     Lab Results   Component Value Date    ALBUMIN 4.2 05/22/2017     Lab Results   Component Value Date    PROTTOTAL 8.0 05/22/2017      Lab Results   Component Value Date    ALKPHOS 62 05/22/2017     Component      Latest Ref Rng & Units 11/11/2016 2/13/2017 5/22/2017   CEA      0 - 2.5 ug/L 1.3 1.9 1.9         IMAGING:  CT c/a/p 5/22/17:  Pending.      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  Normal mismatch repair protein expression.    Adjuvant chemotherapy is not recommended in a stage I tumor, and prognosis should be good.    -CT scan from today is pending; will follow-up and call patient with results  -repeat CT scan and labs in 6 months  -RTC in 6 months  -he had flex sig with Dr. Spivey in  4/11/17, which showed no evidence of tumor recurrence  -colonoscopy in 1 year from surgery (November 2017)    ADDENDUM: CT results from today -     1. In this patient with history of invasive colonic adenocarcinoma,  status post low anterior resection, there is no evidence for residual  or recurrent disease.  2. Increased conspicuity of hypodensity about the falciform ligament,  which may represent focal fatty infiltration. Additionally, there is  slightly increased conspicuity of subcentimeter circular hypodensity  in hepatic segment 6. This is too small to definitely characterize but  may represent simple hepatic cyst. Recommend continued attention on  follow-up examinations.  3. Right paratracheal lymph node measures up to 1.2 cm is not  significantly changed when compared with prior exam and may be  reactive.  4. Sub-4 mm pulmonary nodules as above, not significantly changed from  prior exam. No new or enlarging pulmonary nodules.    I called patient with results, and there was no answer.  I left message and will release the results to [employer].  Overall, the scan is unchanged, without evidence for residual or recurrent disease.  There are some non-specific findings that are stable but need to be followed.  He will have repeat scans in 6 months, as above.        I spent a total of 25 minutes with the patient, with over >50% of the time in counseling and/or coordination of care.         Meenakshi Blue MD  Hematology/Oncology  [facility]

## 2017-05-22 NOTE — MR AVS SNAPSHOT
After Visit Summary   5/22/2017    Cipriano Parry    MRN: [id number]           Patient Information     Date Of Birth          1955        Visit Information        Provider Department      5/22/2017 1:45 PM Meenakshi Blue MD [facility]        Today's Diagnoses     Malignant neoplasm of sigmoid colon (H)           Follow-ups after your visit        Future tests that were ordered for you today     Open Future Orders        Priority Expected Expires Ordered    CBC with platelets differential Routine 11/22/2017 5/22/2018 5/22/2017    Comprehensive metabolic panel Routine 11/22/2017 5/22/2018 5/22/2017    CEA Routine 11/22/2017 5/22/2018 5/22/2017    CT Chest/Abdomen/Pelvis w Contrast Routine 11/22/2017 5/22/2018 5/22/2017            Who to contact     If you have questions or need follow up information about today's clinic visit or your schedule please contact [facility] directly at [phone].  Normal or non-critical lab and imaging results will be communicated to you by [employer], letter or phone within 4 business days after the clinic has received the results. If you do not hear from us within 7 days, please contact the clinic through [employer] or phone. If you have a critical or abnormal lab result, we will notify you by phone as soon as possible.  Submit refill requests through [employer] or call your pharmacy and they will forward the refill request to us. Please allow 3 business days for your refill to be completed.          Additional Information About Your Visit        [employer] Information     [employer] gives you secure access to your electronic health record. If you see a primary care provider, you can also send messages to your care team and make appointments. If you have questions, please call your primary care clinic.  If you do not have a primary care provider, please call [phone] and they will assist you.        Care EveryWhere ID     This  "is your Care EveryWhere ID. This could be used by other organizations to access your [facility] medical records  [id number]        Your Vitals Were     Pulse Temperature Respirations Height Pulse Oximetry BMI (Body Mass Index)    71 98.4  F (36.9  C) (Oral) 16 1.727 m (5' 8\") 94% 22.62 kg/m2       Blood Pressure from Last 3 Encounters:   05/22/17 122/74   04/11/17 120/76   02/13/17 132/87    Weight from Last 3 Encounters:   05/22/17 67.5 kg (148 lb 12.8 oz)   04/11/17 66.7 kg (147 lb 1.6 oz)   02/13/17 64.3 kg (141 lb 12.8 oz)              We Performed the Following     CBC with platelets differential     CEA     Comprehensive metabolic panel          Today's Medication Changes          These changes are accurate as of: 5/22/17  2:03 PM.  If you have any questions, ask your nurse or doctor.               Stop taking these medicines if you haven't already. Please contact your care team if you have questions.     acetaminophen 500 MG tablet   Commonly known as:  TYLENOL   Stopped by:  Meenakshi Blue MD                    Primary Care Provider Office Phone # Fax #    Albino Sue Venegas [phone]       [place] [address]        Thank you!     Thank you for choosing [facility]  for your care. Our goal is always to provide you with excellent care. Hearing back from our patients is one way we can continue to improve our services. Please take a few minutes to complete the written survey that you may receive in the mail after your visit with us. Thank you!             Your Updated Medication List - Protect others around you: Learn how to safely use, store and throw away your medicines at www.disposemymeds.org.          This list is accurate as of: 5/22/17  2:03 PM.  Always use your most recent med list.                   Brand Name Dispense Instructions for use    CO Q 10 PO          lisinopril 5 MG tablet    " PRINIVIL/ZESTRIL    90 tablet    TAKE 1 TABLET (5 MG) BY MOUTH DAILY       metFORMIN 500 MG 24 hr tablet    GLUCOPHAGE-XR    180 tablet    Take 2 tablets (1,000 mg) by mouth daily (with breakfast)       MULTIVITAMIN ADULT PO          simvastatin 10 MG tablet    ZOCOR    90 tablet    TAKE 1 TABLET (10 MG) BY MOUTH AT BEDTIME       VITAMIN D (CHOLECALCIFEROL) PO      Take by mouth daily

## 2017-05-22 NOTE — LETTER
5/22/2017      RE: Cipriano Parry  [address] [facility] [address]       [facility]    Hematology/Oncology Established Patient Note      Today's Date: 05/22/17    Reason for Follow-up: rectosigmoid carcinoma      HISTORY OF PRESENT ILLNESS: Cipriano Parry is a 61 year old male who presents with rectosigmoid cancer.  He underwent laparoscopic-assisted low anterior resection by Dr. Spivey on 11/16/16.  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  Normal mismatch repair protein expression.      He had a basal cell carcinoma removed from his right temple, and follows with dermatology.      INTERIM HISTORY: Cipriano comes in for follow-up today.  He says that he feels completely fine.  He denies abdominal pain or nausea/vomiting.  He says that his bowel movements are normal.            HOME MEDICATIONS:  Current Outpatient Prescriptions   Medication Sig Dispense Refill     Coenzyme Q10 (CO Q 10 PO)        Multiple Vitamins-Minerals (MULTIVITAMIN ADULT PO)        VITAMIN D, CHOLECALCIFEROL, PO Take by mouth daily       lisinopril (PRINIVIL/ZESTRIL) 5 MG tablet TAKE 1 TABLET (5 MG) BY MOUTH DAILY 90 tablet 2     simvastatin (ZOCOR) 10 MG tablet TAKE 1 TABLET (10 MG) BY MOUTH AT BEDTIME 90 tablet 2     metFORMIN (GLUCOPHAGE-XR) 500 MG 24 hr tablet Take 2 tablets (1,000 mg) by mouth daily (with breakfast) 180 tablet 0         ALLERGIES:  Allergies   Allergen Reactions     Penicillin G Rash         PAST MEDICAL HISTORY:  Past Medical History:   Diagnosis Date     Diabetes (H)      HTN (hypertension)      Malignant neoplasm of sigmoid colon (H)          PAST SURGICAL HISTORY:  Past Surgical History:   Procedure Laterality Date     AS REMOVAL OF TONSILS,12+ Y/O       COLONOSCOPY       LAPAROSCOPIC COLECTOMY LOW ANTERIOR  "N/A 2016    Procedure: LAPAROSCOPIC COLECTOMY LOW ANTERIOR;  Surgeon: Oneil Spivey MD;  Location: [facility] OR     SIGMOIDOSCOPY FLEXIBLE N/A 2016    Procedure: SIGMOIDOSCOPY FLEXIBLE;  Surgeon: Oneil Spivey MD;  Location: [facility] OR         SOCIAL HISTORY:  Social History     Social History     Marital status:      Spouse name: N/A     Number of children: N/A     Years of education: N/A     Occupational History     Not on file.     Social History Main Topics     Smoking status: Never Smoker     Smokeless tobacco: Never Used     Alcohol use 0.0 oz/week     0 Standard drinks or equivalent per week      Comment: 0-1 drinks per week      Drug use: No     Sexual activity: Yes     Partners: Female     Birth control/ protection: None     Other Topics Concern     Parent/Sibling W/ Cabg, Mi Or Angioplasty Before 65f 55m? No     Social History Narrative     He has 4 sisters, one of whom has uterine cancer.  He has no family history of colorectal cancer.  He has no biological children.  He lives with his wife in [facility] [facility].  He has no smoking history.  He rarely drinks alcohol.  No illicit drug use.  He works repairing computers.      FAMILY HISTORY:  Family History   Problem Relation Age of Onset     Other Cancer Sister      Other Cancer Sister          PHYSICAL EXAM:  Vital signs:  /74 (BP Location: Right arm, Patient Position: Chair, Cuff Size: Adult Regular)  Pulse 71  Temp 98.4  F (36.9  C) (Oral)  Resp 16  Ht 1.727 m (5' 8\")  Wt 67.5 kg (148 lb 12.8 oz)  SpO2 94%  BMI 22.62 kg/m2   ECO  GENERAL/CONSTITUTIONAL: No acute distress.  EYES: No scleral icterus.  RESPIRATORY: Clear to auscultation bilaterally. No crackles or wheezing.   CARDIOVASCULAR: Regular rate and rhythm without murmurs, gallops, or rubs.  GASTROINTESTINAL: No tenderness. The patient has normal bowel sounds. No guarding.  No distention.  MUSCULOSKELETAL: Warm and well-perfused.  NEUROLOGIC: Alert, oriented, answers " questions appropriately.  INTEGUMENTARY: No jaundice.      LABS:  CBC RESULTS:   Recent Labs   Lab Test  05/22/17   1100   WBC  11.0   RBC  4.99   HGB  15.5   HCT  44.6   MCV  89   MCH  31.1   MCHC  34.8   RDW  13.0   PLT  173     Recent Labs   Lab Test  05/22/17   1100  02/13/17   1357   NA  141  143   POTASSIUM  3.9  4.3   CHLORIDE  106  106   CO2  26  27   ANIONGAP  9  10   GLC  105*  93   BUN  13  12   CR  0.82  0.75   BRISSA  9.5  9.3     Lab Results   Component Value Date    AST 37 05/22/2017     Lab Results   Component Value Date    ALT 39 05/22/2017     No results found for: BILICONJ   Lab Results   Component Value Date    BILITOTAL 0.8 05/22/2017     Lab Results   Component Value Date    ALBUMIN 4.2 05/22/2017     Lab Results   Component Value Date    PROTTOTAL 8.0 05/22/2017      Lab Results   Component Value Date    ALKPHOS 62 05/22/2017     Component      Latest Ref Rng & Units 11/11/2016 2/13/2017 5/22/2017   CEA      0 - 2.5 ug/L 1.3 1.9 1.9         IMAGING:  CT c/a/p 5/22/17:  Pending.      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  Normal mismatch repair protein expression.    Adjuvant chemotherapy is not recommended in a stage I tumor, and prognosis should be good.    -CT scan from today is pending; will follow-up and call patient with results  -repeat CT scan and labs in 6 months  -RTC in 6 months  -he had flex sig with Dr. Spivey in 4/11/17, which showed no evidence of tumor recurrence  -colonoscopy in 1 year from surgery (November 2017)    ADDENDUM: CT results from today -     1. In this patient with history of invasive colonic adenocarcinoma,  status post low anterior  resection, there is no evidence for residual  or recurrent disease.  2. Increased conspicuity of hypodensity about the falciform ligament,  which may represent focal fatty infiltration. Additionally, there is  slightly increased conspicuity of subcentimeter circular hypodensity  in hepatic segment 6. This is too small to definitely characterize but  may represent simple hepatic cyst. Recommend continued attention on  follow-up examinations.  3. Right paratracheal lymph node measures up to 1.2 cm is not  significantly changed when compared with prior exam and may be  reactive.  4. Sub-4 mm pulmonary nodules as above, not significantly changed from  prior exam. No new or enlarging pulmonary nodules.    I called patient with results, and there was no answer.  I left message and will release the results to [employer].  Overall, the scan is unchanged, without evidence for residual or recurrent disease.  There are some non-specific findings that are stable but need to be followed.  He will have repeat scans in 6 months, as above.        I spent a total of 25 minutes with the patient, with over >50% of the time in counseling and/or coordination of care.         Meenakshi Blue MD  Hematology/Oncology  [facility]

## 2017-05-22 NOTE — NURSING NOTE
"Oncology Rooming Note    May 22, 2017 1:36 PM   Cipriano Parry is a 52 year old male who presents for: Blood Draw and Oncology Clinic Visit    Initial Vitals: /74 (BP Location: Right arm, Patient Position: Chair, Cuff Size: Adult Regular)  Pulse 71  Temp 98.4  F (36.9  C) (Oral)  Resp 16  Ht 1.727 m (5' 8\")  Wt 67.5 kg (148 lb 12.8 oz)  SpO2 94%  BMI 22.62 kg/m2 Estimated body mass index is 22.62 kg/(m^2) as calculated from the following:    Height as of this encounter: 1.727 m (5' 8\").    Weight as of this encounter: 67.5 kg (148 lb 12.8 oz). Body surface area is 1.8 meters squared.  No Pain (0) Comment: Data Unavailable   No LMP for male patient.  Allergies reviewed: Yes  Medications reviewed: Yes    Medications: Medication refills not needed today.  Pharmacy name entered into [employer]: [id number] - [facility] [address] AT [facility]    Clinical concerns    6 minutes for nursing intake (face to face time)     Linda Hilario CMA                              "

## 2017-06-05 DIAGNOSIS — E11.9 TYPE 2 DIABETES MELLITUS WITHOUT COMPLICATION, UNSPECIFIED LONG TERM INSULIN USE STATUS: ICD-10-CM

## 2017-06-06 NOTE — TELEPHONE ENCOUNTER
metFORMIN (GLUCOPHAGE-XR) 500 MG 24 hr tablet         Last Written Prescription Date: 02/08/17  Last Fill Quantity: 180, # refills: 0  Last Office Visit with G, P or [facility] prescribing provider:  01/27/17        BP Readings from Last 3 Encounters:   05/22/17 122/74   04/11/17 120/76   02/13/17 132/87     Lab Results   Component Value Date    MICROL <5 01/23/2017     Lab Results   Component Value Date    UMALCR Unable to calculate due to low value 01/23/2017     Creatinine   Date Value Ref Range Status   05/22/2017 0.82 0.66 - 1.25 mg/dL Final   ]  GFR Estimate   Date Value Ref Range Status   05/22/2017 86 >60 mL/min/1.7m2 Final   05/22/2017 >90  Non  GFR Calc   >60 mL/min/1.7m2 Final   02/13/2017 >90  Non  GFR Calc   >60 mL/min/1.7m2 Final     GFR Estimate If Black   Date Value Ref Range Status   05/22/2017 >90 >60 mL/min/1.7m2 Final   05/22/2017 >90   GFR Calc   >60 mL/min/1.7m2 Final   02/13/2017 >90   GFR Calc   >60 mL/min/1.7m2 Final     Lab Results   Component Value Date    CHOL 101 01/23/2017     Lab Results   Component Value Date    HDL 33 01/23/2017     Lab Results   Component Value Date    LDL 55 01/23/2017     Lab Results   Component Value Date    TRIG 67 01/23/2017     No results found for: CHOLHDLRATIO  Lab Results   Component Value Date    AST 37 05/22/2017     Lab Results   Component Value Date    ALT 39 05/22/2017     Lab Results   Component Value Date    A1C 5.6 01/23/2017    A1C 5.8 11/15/2016    A1C 5.7 08/08/2016    A1C 8.7 03/07/2016    A1C 12.1 02/01/2016     Potassium   Date Value Ref Range Status   05/22/2017 3.9 3.4 - 5.3 mmol/L Final

## 2017-06-08 RX ORDER — METFORMIN HCL 500 MG
TABLET, EXTENDED RELEASE 24 HR ORAL
Qty: 180 TABLET | Refills: 0 | Status: SHIPPED | OUTPATIENT
Start: 2017-06-08 | End: 2017-09-06

## 2017-06-08 NOTE — TELEPHONE ENCOUNTER
Routing refill request to provider for review/approval because:  Part of microalbumin unable to be calculated, defer to provider    Dr. Venegas-Please sign if agree.    Thank you!  MICHAEL Arboleda, DIANDRAN, RN

## 2017-09-06 ENCOUNTER — OFFICE VISIT (OUTPATIENT)
Dept: FAMILY MEDICINE | Facility: CLINIC | Age: 62
End: 2017-09-06
Payer: COMMERCIAL

## 2017-09-06 VITALS
TEMPERATURE: 98 F | DIASTOLIC BLOOD PRESSURE: 76 MMHG | HEART RATE: 70 BPM | HEIGHT: 68 IN | WEIGHT: 147.75 LBS | OXYGEN SATURATION: 98 % | BODY MASS INDEX: 22.39 KG/M2 | SYSTOLIC BLOOD PRESSURE: 137 MMHG

## 2017-09-06 DIAGNOSIS — Z23 NEED FOR PROPHYLACTIC VACCINATION AND INOCULATION AGAINST INFLUENZA: ICD-10-CM

## 2017-09-06 DIAGNOSIS — C18.7 MALIGNANT NEOPLASM OF SIGMOID COLON (H): ICD-10-CM

## 2017-09-06 DIAGNOSIS — E11.9 TYPE 2 DIABETES MELLITUS WITHOUT COMPLICATION, UNSPECIFIED LONG TERM INSULIN USE STATUS: Primary | ICD-10-CM

## 2017-09-06 LAB — HBA1C MFR BLD: 5.7 % (ref 4.3–6)

## 2017-09-06 PROCEDURE — 99214 OFFICE O/P EST MOD 30 MIN: CPT | Mod: 25 | Performed by: FAMILY MEDICINE

## 2017-09-06 PROCEDURE — 36415 COLL VENOUS BLD VENIPUNCTURE: CPT | Performed by: FAMILY MEDICINE

## 2017-09-06 PROCEDURE — 99207 C FOOT EXAM  NO CHARGE: CPT | Mod: 25 | Performed by: FAMILY MEDICINE

## 2017-09-06 PROCEDURE — 90471 IMMUNIZATION ADMIN: CPT | Performed by: FAMILY MEDICINE

## 2017-09-06 PROCEDURE — 83036 HEMOGLOBIN GLYCOSYLATED A1C: CPT | Performed by: FAMILY MEDICINE

## 2017-09-06 PROCEDURE — 90686 IIV4 VACC NO PRSV 0.5 ML IM: CPT | Performed by: FAMILY MEDICINE

## 2017-09-06 RX ORDER — METFORMIN HCL 500 MG
500 TABLET, EXTENDED RELEASE 24 HR ORAL EVERY MORNING
Qty: 90 TABLET | Refills: 1 | Status: SHIPPED | OUTPATIENT
Start: 2017-09-06 | End: 2018-01-24

## 2017-09-06 NOTE — MR AVS SNAPSHOT
After Visit Summary   9/6/2017    Cipriano Parry    MRN: [id number]           Patient Information     Date Of Birth          1955        Visit Information        Provider Department      9/6/2017 9:40 AM Albino Venegas MD [facility]        Today's Diagnoses     Type 2 diabetes mellitus without complication, unspecified long term insulin use status (H)    -  1    Malignant neoplasm of sigmoid colon (H)        Need for prophylactic vaccination and inoculation against influenza           Follow-ups after your visit        Your next 10 appointments already scheduled     Nov 13, 2017  8:45 AM CST   Lab with [facility] LAB   [facility] ([facility])    [address] [place] [id number] [phone]            Nov 13, 2017  9:00 AM CST   (Arrive by 8:45 AM)   CT CHEST/ABDOMEN/PELVIS W CONTRAST with UCCT1   [facility] [place] [facility] ([facility])    [address] [place] [id number] [phone]           Please bring any scans or X-rays taken at other hospitals, if similar tests were done. Also bring a list of your medicines, including vitamins, minerals and over-the-counter drugs. It is safest to leave personal items at home.  Be sure to tell your doctor:   If you have any allergies.   If there s any chance you are pregnant.   If you are breastfeeding.   If you have any special needs.  You may have contrast for this exam. To prepare:   Do not eat or drink for 2 hours before your exam. If you need to take medicine, you may take it with small sips of water. (We may ask you to take liquid medicine as well.)   The day before your exam, drink extra fluids at least six 8-ounce glasses (unless your doctor tells you to restrict your fluids).  Patients over 70 or patients with diabetes or kidney problems:   If you haven t had a blood test (creatinine test) within the last 30 days, go to your  clinic or Diagnostic Imaging Department for this test.  If you have diabetes:   If your kidney function is normal, continue taking your metformin (Avandamet, Glucophage, Glucovance, Metaglip) on the day of your exam.   If your kidney function is abnormal, wait 48 hours before restarting this medicine.  You will have oral contrast for this exam:   You will drink the contrast at home. Get this from your clinic or Diagnostic Imaging Department. Please follow the directions given.  Please wear loose clothing, such as a sweat suit or jogging clothes. Avoid snaps, zippers and other metal. We may ask you to undress and put on a hospital gown.  If you have any questions, please call the Imaging Department where you will have your exam.            Nov 20, 2017  8:45 AM CST   (Arrive by 8:30 AM)   Return Visit with Meenakshi Blue MD   [facility] ([facility] [place])    [address] [place] [id number] [phone]              Who to contact     If you have questions or need follow up information about today's clinic visit or your schedule please contact [facility] directly at [phone].  Normal or non-critical lab and imaging results will be communicated to you by MyChart, letter or phone within 4 business days after the clinic has received the results. If you do not hear from us within 7 days, please contact the clinic through [employer] or phone. If you have a critical or abnormal lab result, we will notify you by phone as soon as possible.  Submit refill requests through [employer] or call your pharmacy and they will forward the refill request to us. Please allow 3 business days for your refill to be completed.          Additional Information About Your Visit        [employer] Information     [employer] gives you secure access to your electronic health record. If you see a primary care provider, you can also send messages to your care team  "and make appointments. If you have questions, please call your primary care clinic.  If you do not have a primary care provider, please call [phone] and they will assist you.        Care EveryWhere ID     This is your Care EveryWhere ID. This could be used by other organizations to access your [place] medical records  [id number]        Your Vitals Were     Pulse Temperature Height Pulse Oximetry BMI (Body Mass Index)       70 98  F (36.7  C) (Oral) 5' 8\" (1.727 m) 98% 22.47 kg/m2        Blood Pressure from Last 3 Encounters:   09/06/17 137/76   05/22/17 122/74   04/11/17 120/76    Weight from Last 3 Encounters:   09/06/17 147 lb 12 oz (67 kg)   05/22/17 148 lb 12.8 oz (67.5 kg)   04/11/17 147 lb 1.6 oz (66.7 kg)              We Performed the Following     C FOOT EXAM  NO CHARGE     FLU VAC, SPLIT VIRUS IM > 3 YO (QUADRIVALENT) [83483]     Hemoglobin A1c     Vaccine Administration, Initial [62832]          Today's Medication Changes          These changes are accurate as of: 9/6/17 11:38 AM.  If you have any questions, ask your nurse or doctor.               These medicines have changed or have updated prescriptions.        Dose/Directions    metFORMIN 500 MG 24 hr tablet   Commonly known as:  GLUCOPHAGE-XR   This may have changed:  See the new instructions.   Used for:  Type 2 diabetes mellitus without complication, unspecified long term insulin use status (H)   Changed by:  Albino Venegas MD        Dose:  500 mg   Take 1 tablet (500 mg) by mouth every morning   Quantity:  90 tablet   Refills:  1            Where to get your medicines      These medications were sent to [facility]/pharmacy #[id number] - [address] AT [address] [address]     Phone:  [phone]     metFORMIN 500 MG 24 hr tablet                Primary Care Provider Office Phone # Fax #    Albino Venegas [phone] [address] [facility] [address]        Equal Access to Services     JOSE ARMANDO TERAN : Hadii aad ku hadcamilashannon Hancock, wacoronahernando aragonjoselynha, qarejita ryanalmachamp gould. So [facility] [phone].    ATENCIÓN: Si habla español, tiene a kemp disposición servicios gratuitos de asistencia lingüística. Carlosame al [phone].    We comply with applicable federal civil rights laws and [place] laws. We do not discriminate on the basis of race, color, national origin, age, disability sex, sexual orientation or gender identity.            Thank you!     Thank you for choosing [facility]  for your care. Our goal is always to provide you with excellent care. Hearing back from our patients is one way we can continue to improve our services. Please take a few minutes to complete the written survey that you may receive in the mail after your visit with us. Thank you!             Your Updated Medication List - Protect others around you: Learn how to safely use, store and throw away your medicines at www.disposemymeds.org.          This list is accurate as of: 9/6/17 11:38 AM.  Always use your most recent med list.                   Brand Name Dispense Instructions for use Diagnosis    CO Q 10 PO       Malignant neoplasm of sigmoid colon (H)       lisinopril 5 MG tablet    PRINIVIL/ZESTRIL    90 tablet    TAKE 1 TABLET (5 MG) BY MOUTH DAILY    Type 2 diabetes mellitus with hyperglycemia (H)       metFORMIN 500 MG 24 hr tablet    GLUCOPHAGE-XR    90 tablet    Take 1 tablet (500 mg) by mouth every morning    Type 2 diabetes mellitus without complication, unspecified long term insulin use status (H)       MULTIVITAMIN ADULT PO           simvastatin 10 MG tablet    ZOCOR    90 tablet    TAKE 1 TABLET (10 MG) BY MOUTH AT BEDTIME    Type 2 diabetes mellitus with hyperglycemia (H)       VITAMIN D (CHOLECALCIFEROL) PO      Take by mouth daily

## 2017-09-06 NOTE — PROGRESS NOTES
SUBJECTIVE:   Cipriano Parry is a 62 year old male who presents to clinic today for the following health issues:      Diabetes Follow-up    Patient is checking blood sugars: once daily.  Results are as follows:       am - 112-122    Diabetic concerns: None     Symptoms of hypoglycemia (low blood sugar): none     Paresthesias (numbness or burning in feet) or sores: No     Date of last diabetic eye exam: 2016  Hypertension Follow-up      Outpatient blood pressures are not being checked.    Low Salt Diet: no added salt    Amount of exercise or physical activity: None    Problems taking medications regularly: No    Medication side effects: none    Diet: regular (no restrictions).   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   First day off in last 6 months.   Admits not as well with diet and exercise due to above.     Will be having follow up colonoscopy and specialist appointment for cancer follow up. Will be having CT scan also. Doing well. Keeping an eye on his weight.     Problem list and histories reviewed & adjusted, as indicated.  Additional history: as documented    Labs reviewed in EPIC    Reviewed and updated as needed this visit by clinical staff     Reviewed and updated as needed this visit by Provider      Social History     Social History     Marital status:      Spouse name: N/A     Number of children: N/A     Years of education: N/A     Social History Main Topics     Smoking status: Never Smoker     Smokeless tobacco: Never Used     Alcohol use 0.0 oz/week     0 Standard drinks or equivalent per week      Comment: 0-1 drinks per week      Drug use: No     Sexual activity: Yes     Partners: Female     Birth control/ protection: None     Other Topics Concern     Parent/Sibling W/ Cabg, Mi Or Angioplasty Before 65f 55m? No     Social History Narrative     Allergies   Allergen Reactions     Penicillin G Rash     Patient Active Problem List   Diagnosis     Advanced  "directives, counseling/discussion     Elevated blood pressure reading without diagnosis of hypertension     Type 2 diabetes mellitus without complication (H)     Malignant neoplasm of sigmoid colon (H)     Reviewed medications, social history and  past medical and surgical history.    Review of system: for general, respiratory, CVS, GI and psychiatry negative except for noted above.     EXAM:  /76 (Cuff Size: Adult Regular)  Pulse 70  Temp 98  F (36.7  C) (Oral)  Ht 5' 8\" (1.727 m)  Wt 147 lb 12 oz (67 kg)  SpO2 98%  BMI 22.47 kg/m2  Constitutional: healthy, alert and no distress   Psychiatric: mentation appears normal and affect normal/bright  Cardiovascular: RRR. No murmurs,  Respiratory: negative, Lungs clear. No crackles or wheezing. No tachypnea.   Normal monofilament both lower limbs.     ASSESSMENT / PLAN:  (E11.9) Type 2 diabetes mellitus without complication, unspecified long term insulin use status (H)  (primary encounter diagnosis)  Comment: doing really well. Cut down metformin to 500mg from 1000mg per day. He agreed. Continue lifestyle changes as he is doing already.   Plan: Hemoglobin A1c, C FOOT EXAM  NO CHARGE,         metFORMIN (GLUCOPHAGE-XR) 500 MG 24 hr tablet             (C18.7) Malignant neoplasm of sigmoid colon (H)  Comment: had laparoscopic assisted resection of colon and doing well. Will be having colonoscopy, ct scan and follow up with oncology in few months.   Plan:      (Z23) Need for prophylactic vaccination and inoculation against influenza  Comment:    Plan: FLU VAC, SPLIT VIRUS IM > 3 YO (QUADRIVALENT)         [12257], Vaccine Administration, Initial         [05981]           Follow up in 6 months. Physical and labs at that time.     Injectable Influenza Immunization Documentation    1.  Is the person to be vaccinated sick today?  No    2. Does the person to be vaccinated have an allergy to eggs or to a component of the vaccine?  No    3. Has the person to be vaccinated " today ever had a serious reaction to influenza vaccine in the past?  No    4. Has the person to be vaccinated ever had Guillain-Laquey syndrome?  No     Form completed by Sue Gutierrez [facility]

## 2017-09-06 NOTE — NURSING NOTE
"Chief Complaint   Patient presents with     Hypertension     Diabetes       Initial /76 (Cuff Size: Adult Regular)  Pulse 70  Temp 98  F (36.7  C) (Oral)  Ht 5' 8\" (1.727 m)  Wt 147 lb 12 oz (67 kg)  SpO2 98%  BMI 22.47 kg/m2 Estimated body mass index is 22.47 kg/(m^2) as calculated from the following:    Height as of this encounter: 5' 8\" (1.727 m).    Weight as of this encounter: 147 lb 12 oz (67 kg).  Medication Reconciliation: complete     Sue Gutierrez, DWAINE      "

## 2017-10-01 ENCOUNTER — MYC REFILL (OUTPATIENT)
Dept: FAMILY MEDICINE | Facility: CLINIC | Age: 62
End: 2017-10-01

## 2017-10-01 DIAGNOSIS — E11.65 TYPE 2 DIABETES MELLITUS WITH HYPERGLYCEMIA (H): ICD-10-CM

## 2017-10-02 RX ORDER — LISINOPRIL 5 MG/1
5 TABLET ORAL DAILY
Qty: 7 TABLET | Refills: 0 | Status: SHIPPED | OUTPATIENT
Start: 2017-10-02 | End: 2017-12-26

## 2017-10-02 RX ORDER — SIMVASTATIN 10 MG
10 TABLET ORAL AT BEDTIME
Qty: 7 TABLET | Refills: 0 | Status: SHIPPED | OUTPATIENT
Start: 2017-10-02 | End: 2019-03-22

## 2017-10-02 NOTE — TELEPHONE ENCOUNTER
Message from Bharatt:  Original authorizing provider: Albino Venegas MD, MD Cipriano Parry would like a refill of the following medications:  lisinopril (PRINIVIL/ZESTRIL) 5 MG tablet [Albino Venegas MD, MD]  simvastatin (ZOCOR) 10 MG tablet [Albino Venegas MD, MD]    Preferred pharmacy: [facility] [phone]    Comment:  I am on vacation near [place]. I forgot my medication, was able to  7 tablets of metaforin at [facility] because it was on the refill status. Could you put a week emergency refill for the lisinopril and Sirmvastatin. I would like to pick it up at [facility] [address] [phone] Cipriano [email or web address] [place] [facility]

## 2017-10-30 ENCOUNTER — TELEPHONE (OUTPATIENT)
Dept: GASTROENTEROLOGY | Facility: OUTPATIENT CENTER | Age: 62
End: 2017-10-30

## 2017-10-30 NOTE — TELEPHONE ENCOUNTER
Patient taking any blood thinners ? no    Heart disease ? denies    Lung disease ? denies      Sleep apnea ? denies    Diabetic ? Type 2    Kidney disease ? denies    Dialysis ? n/a    Electronic implanted medical devices ? denies    Are you taking any narcotic pain medication ? no  What is your daily dosage ?    PTSD ? n/a    Prep instructions reviewed with patient ? Patient declined review, has had exam before.  policy, MAC sedation plan reviewed.     Pharmacy : n/a    Indication for procedure : Follow up colon cancer zachary    Referring provider : Dr. Spivey

## 2017-11-06 ENCOUNTER — TRANSFERRED RECORDS (OUTPATIENT)
Dept: HEALTH INFORMATION MANAGEMENT | Facility: CLINIC | Age: 62
End: 2017-11-06

## 2017-11-06 ENCOUNTER — DOCUMENTATION ONLY (OUTPATIENT)
Dept: GASTROENTEROLOGY | Facility: OUTPATIENT CENTER | Age: 62
End: 2017-11-06

## 2017-11-13 DIAGNOSIS — C18.7 MALIGNANT NEOPLASM OF SIGMOID COLON (H): ICD-10-CM

## 2017-11-13 LAB
ALBUMIN SERPL-MCNC: 4.3 G/DL (ref 3.4–5)
ALP SERPL-CCNC: 68 U/L (ref 40–150)
ALT SERPL W P-5'-P-CCNC: 54 U/L (ref 0–70)
ANION GAP SERPL CALCULATED.3IONS-SCNC: 7 MMOL/L (ref 3–14)
AST SERPL W P-5'-P-CCNC: 40 U/L (ref 0–45)
BASOPHILS # BLD AUTO: 0 10E9/L (ref 0–0.2)
BASOPHILS NFR BLD AUTO: 0.4 %
BILIRUB SERPL-MCNC: 0.9 MG/DL (ref 0.2–1.3)
BUN SERPL-MCNC: 12 MG/DL (ref 7–30)
CALCIUM SERPL-MCNC: 9.2 MG/DL (ref 8.5–10.1)
CEA SERPL-MCNC: 1.4 UG/L (ref 0–2.5)
CHLORIDE SERPL-SCNC: 105 MMOL/L (ref 94–109)
CO2 SERPL-SCNC: 26 MMOL/L (ref 20–32)
CREAT SERPL-MCNC: 0.93 MG/DL (ref 0.66–1.25)
DIFFERENTIAL METHOD BLD: ABNORMAL
EOSINOPHIL # BLD AUTO: 0.3 10E9/L (ref 0–0.7)
EOSINOPHIL NFR BLD AUTO: 2.3 %
ERYTHROCYTE [DISTWIDTH] IN BLOOD BY AUTOMATED COUNT: 11.9 % (ref 10–15)
GFR SERPL CREATININE-BSD FRML MDRD: 82 ML/MIN/1.7M2
GLUCOSE SERPL-MCNC: 128 MG/DL (ref 70–99)
HCT VFR BLD AUTO: 45 % (ref 40–53)
HGB BLD-MCNC: 15.8 G/DL (ref 13.3–17.7)
IMM GRANULOCYTES # BLD: 0 10E9/L (ref 0–0.4)
IMM GRANULOCYTES NFR BLD: 0.2 %
LYMPHOCYTES # BLD AUTO: 6.6 10E9/L (ref 0.8–5.3)
LYMPHOCYTES NFR BLD AUTO: 58.3 %
MCH RBC QN AUTO: 31.3 PG (ref 26.5–33)
MCHC RBC AUTO-ENTMCNC: 35.1 G/DL (ref 31.5–36.5)
MCV RBC AUTO: 89 FL (ref 78–100)
MONOCYTES # BLD AUTO: 0.6 10E9/L (ref 0–1.3)
MONOCYTES NFR BLD AUTO: 5.1 %
NEUTROPHILS # BLD AUTO: 3.8 10E9/L (ref 1.6–8.3)
NEUTROPHILS NFR BLD AUTO: 33.7 %
NRBC # BLD AUTO: 0 10*3/UL
NRBC BLD AUTO-RTO: 0 /100
PLATELET # BLD AUTO: 159 10E9/L (ref 150–450)
POTASSIUM SERPL-SCNC: 4 MMOL/L (ref 3.4–5.3)
PROT SERPL-MCNC: 7.9 G/DL (ref 6.8–8.8)
RBC # BLD AUTO: 5.05 10E12/L (ref 4.4–5.9)
SODIUM SERPL-SCNC: 138 MMOL/L (ref 133–144)
WBC # BLD AUTO: 11.3 10E9/L (ref 4–11)

## 2017-11-20 ENCOUNTER — ONCOLOGY VISIT (OUTPATIENT)
Dept: ONCOLOGY | Facility: CLINIC | Age: 62
End: 2017-11-20
Attending: INTERNAL MEDICINE
Payer: COMMERCIAL

## 2017-11-20 VITALS
OXYGEN SATURATION: 97 % | RESPIRATION RATE: 16 BRPM | WEIGHT: 153 LBS | BODY MASS INDEX: 23.26 KG/M2 | SYSTOLIC BLOOD PRESSURE: 134 MMHG | TEMPERATURE: 98.5 F | HEART RATE: 80 BPM | DIASTOLIC BLOOD PRESSURE: 86 MMHG

## 2017-11-20 DIAGNOSIS — D72.820 LYMPHOCYTOSIS: ICD-10-CM

## 2017-11-20 DIAGNOSIS — C18.7 MALIGNANT NEOPLASM OF SIGMOID COLON (H): Primary | ICD-10-CM

## 2017-11-20 PROCEDURE — 99212 OFFICE O/P EST SF 10 MIN: CPT | Mod: ZF

## 2017-11-20 PROCEDURE — 99214 OFFICE O/P EST MOD 30 MIN: CPT | Mod: ZP | Performed by: INTERNAL MEDICINE

## 2017-11-20 ASSESSMENT — PAIN SCALES - GENERAL: PAINLEVEL: NO PAIN (0)

## 2017-11-20 NOTE — LETTER
11/20/2017       RE: Cipriano Parry  [address] [facility] [address]     Dear Colleague,    Thank you for referring your patient, Cipriano Parry, to the [facility]. Please see a copy of my visit note below.    [facility]    Hematology/Oncology Established Patient Note      Today's Date: 11/20/17    Reason for Follow-up: rectosigmoid carcinoma      HISTORY OF PRESENT ILLNESS: Cipriano Parry is a 62 year old male who presents with rectosigmoid cancer.  He underwent laparoscopic-assisted low anterior resection by Dr. Spivey on 11/16/16.  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  Normal mismatch repair protein expression.      He had a basal cell carcinoma removed from his right temple, and follows with dermatology.      INTERIM HISTORY: Cipriano comes in for follow-up today.   He says that he is feeling well.  He denies any complaints.  He had his one-year colonoscopy with Dr. Spivey on 11/6/17, which was normal.  His next one would be in 3 years.  He denies nausea/vomiting or pain.  He notes some constipation occasionally.        REVIEW OF SYSTEMS:   General Symptoms: No  Skin Symptoms: No  HENT Symptoms: No  EYE SYMPTOMS: No  HEART SYMPTOMS: No  LUNG SYMPTOMS: No  INTESTINAL SYMPTOMS: No  URINARY SYMPTOMS: No  REPRODUCTIVE SYMPTOMS: No  SKELETAL SYMPTOMS: No  BLOOD SYMPTOMS: No  NERVOUS SYSTEM SYMPTOMS: No  MENTAL HEALTH SYMPTOMS: No        HOME MEDICATIONS:  Current Outpatient Prescriptions   Medication Sig Dispense Refill     lisinopril (PRINIVIL/ZESTRIL) 5 MG tablet Take 1 tablet (5 mg) by mouth daily 7 tablet 0     simvastatin (ZOCOR) 10 MG tablet Take 1 tablet (10 mg) by mouth At Bedtime 7 tablet 0     metFORMIN (GLUCOPHAGE-XR) 500 MG 24 hr tablet Take 1 tablet (500 mg) by mouth  every morning 90 tablet 1     Coenzyme Q10 (CO Q 10 PO)        Multiple Vitamins-Minerals (MULTIVITAMIN ADULT PO)        VITAMIN D, CHOLECALCIFEROL, PO Take by mouth daily           ALLERGIES:  Allergies   Allergen Reactions     Penicillin G Rash         PAST MEDICAL HISTORY:  Past Medical History:   Diagnosis Date     Diabetes (H)      HTN (hypertension)      Malignant neoplasm of sigmoid colon (H)          PAST SURGICAL HISTORY:  Past Surgical History:   Procedure Laterality Date     AS REMOVAL OF TONSILS,12+ Y/O       COLONOSCOPY       LAPAROSCOPIC COLECTOMY LOW ANTERIOR N/A 11/16/2016    Procedure: LAPAROSCOPIC COLECTOMY LOW ANTERIOR;  Surgeon: Oneil Spivey MD;  Location: [facility] OR     SIGMOIDOSCOPY FLEXIBLE N/A 11/16/2016    Procedure: SIGMOIDOSCOPY FLEXIBLE;  Surgeon: Oneil Spivey MD;  Location: [facility] OR         SOCIAL HISTORY:  Social History     Social History     Marital status:      Spouse name: N/A     Number of children: N/A     Years of education: N/A     Occupational History     Not on file.     Social History Main Topics     Smoking status: Never Smoker     Smokeless tobacco: Never Used     Alcohol use 0.0 oz/week     0 Standard drinks or equivalent per week      Comment: 0-1 drinks per week      Drug use: No     Sexual activity: Yes     Partners: Female     Birth control/ protection: None     Other Topics Concern     Parent/Sibling W/ Cabg, Mi Or Angioplasty Before 65f 55m? No     Social History Narrative     He has 4 sisters, one of whom has uterine cancer.  He has no family history of colorectal cancer.  He has no biological children.  He lives with his wife in [facility].  He has no smoking history.  He rarely drinks alcohol.  No illicit drug use.  He works repairing computers.      FAMILY HISTORY:  Family History   Problem Relation Age of Onset     Other Cancer Sister          PHYSICAL EXAM:  Vital signs:  /86  Pulse 80  Temp 98.5  F (36.9  C) (Oral)  Resp 16  Wt 69.4 kg  (153 lb)  SpO2 97%  BMI 23.26 kg/m2   ECO  GENERAL/CONSTITUTIONAL: No acute distress.  EYES: No scleral icterus.  RESPIRATORY: Clear to auscultation bilaterally. No crackles or wheezing.   CARDIOVASCULAR: Regular rate and rhythm without murmurs, gallops, or rubs.  GASTROINTESTINAL: No tenderness. The patient has normal bowel sounds. No guarding.  No distention.  MUSCULOSKELETAL: Warm and well-perfused, no edema.  NEUROLOGIC: Alert, oriented, answers questions appropriately.  INTEGUMENTARY: No jaundice.      LABS:  CBC RESULTS:   Recent Labs   Lab Test  17   0854   WBC  11.3*   RBC  5.05   HGB  15.8   HCT  45.0   MCV  89   MCH  31.3   MCHC  35.1   RDW  11.9   PLT  159     Recent Labs   Lab Test  17   0854  17   1100   NA  138  141   POTASSIUM  4.0  3.9   CHLORIDE  105  106   CO2  26  26   ANIONGAP  7  9   GLC  128*  105*   BUN  12  13   CR  0.93  0.82   BRISSA  9.2  9.5     Lab Results   Component Value Date    AST 40 2017     Lab Results   Component Value Date    ALT 54 2017     No results found for: BILICONJ   Lab Results   Component Value Date    BILITOTAL 0.9 2017     Lab Results   Component Value Date    ALBUMIN 4.3 2017     Lab Results   Component Value Date    PROTTOTAL 7.9 2017      Lab Results   Component Value Date    ALKPHOS 68 2017     Component      Latest Ref Rng & Units 2016   CEA      0 - 2.5 ug/L 1.3 1.9 1.9 1.4         IMAGING:  CT c/a/p 17:  1. In this patient with adenocarcinoma of the colon status post  surgical resection, there is no convincing evidence for recurrent or  metastatic disease in the chest, abdomen or pelvis.  a. Stable hepatic hypodensities about the falciform ligament, favored  to represent focal fatty infiltration, and within hepatic segment 6,  most likely a cyst. Recommend continued attention on follow up.  b. Sub-4 mm pulmonary nodules are unchanged. No new or  enlarging  pulmonary nodules.  c. Unchanged pericardiophrenic and pretracheal lymph nodes. No new or  enlarging lymphadenopathy.  2. Large colonic stool burden and formed stool in the distal small  bowel, suggestive of slow bowel transit.         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  Normal mismatch repair protein expression.    Adjuvant chemotherapy is not recommended in a stage I tumor, and prognosis should be good.    -CT scan on 11/13/17 shows no evidence of recurrent of metastatic disease in the chest, abdomen, or pelvis  -due to some non-specific findings, as below, that should be monitored, will repeat CT scan and labs in 6 months  -RTC in 6 months  -he had his one-year colonoscopy with Dr. Spivey on 11/6/17.  There was no evidence of recurrence.  Next colonoscopy will be in 3 years from then (November 2020)  -he will have a flex sig in clinic with Dr. Spivey in 1 year from then (November 2018)    2) Hepatic hypodensities: seen at the falciform ligament, favored to represent focal fatty infiltration, and within hepatic segment 6, likely a cyst.  Follow-up is recommended.  -follow-up on scans in 6 months    3) Pulmonary nodules: sub-4 mm pulmonary nodules are unchanged.  No new or enlarging pulmonary nodules  -follow-up on scans in 6 months    4) Pericardiophrenic and pretracheal lymph nodes: unchanged.  No new or enlarging lymphadenopathy.  -follow-up on scans in 6 months    5) Constipation:   -he will try stool softeners or laxatives as needed    6) Leukocytosis: Mild, differential with lymphocytosis.  WBC has been slightly high previously as well.  May be the beginning of  CLL or a monoclonal B-cell lymphocytosis versus reactive.  Scans without new or enlarging lymphadenopathy.  Hemoglobin and platelet count are normal.  -will monitor for now  -repeat CBC with differential at the next check, along with peripheral smear and peripheral flow cytometry      I spent a total of 25 minutes with the patient, with over >50% of the time in counseling and/or coordination of care.       Meenakshi Blue MD  Hematology/Oncology  [facility]      Again, thank you for allowing me to participate in the care of your patient.      Sincerely,    Meenakshi Blue MD

## 2017-11-20 NOTE — PROGRESS NOTES
[facility]    Hematology/Oncology Established Patient Note      Today's Date: 11/20/17    Reason for Follow-up: rectosigmoid carcinoma      HISTORY OF PRESENT ILLNESS: Cipriano Parry is a 62 year old male who presents with rectosigmoid cancer.  He underwent laparoscopic-assisted low anterior resection by Dr. Spivey on 11/16/16.  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  Normal mismatch repair protein expression.      He had a basal cell carcinoma removed from his right temple, and follows with dermatology.      INTERIM HISTORY: Cipriano comes in for follow-up today.   He says that he is feeling well.  He denies any complaints.  He had his one-year colonoscopy with Dr. Spivey on 11/6/17, which was normal.  His next one would be in 3 years.  He denies nausea/vomiting or pain.  He notes some constipation occasionally.        REVIEW OF SYSTEMS:   General Symptoms: No  Skin Symptoms: No  HENT Symptoms: No  EYE SYMPTOMS: No  HEART SYMPTOMS: No  LUNG SYMPTOMS: No  INTESTINAL SYMPTOMS: No  URINARY SYMPTOMS: No  REPRODUCTIVE SYMPTOMS: No  SKELETAL SYMPTOMS: No  BLOOD SYMPTOMS: No  NERVOUS SYSTEM SYMPTOMS: No  MENTAL HEALTH SYMPTOMS: No        HOME MEDICATIONS:  Current Outpatient Prescriptions   Medication Sig Dispense Refill     lisinopril (PRINIVIL/ZESTRIL) 5 MG tablet Take 1 tablet (5 mg) by mouth daily 7 tablet 0     simvastatin (ZOCOR) 10 MG tablet Take 1 tablet (10 mg) by mouth At Bedtime 7 tablet 0     metFORMIN (GLUCOPHAGE-XR) 500 MG 24 hr tablet Take 1 tablet (500 mg) by mouth every morning 90 tablet 1     Coenzyme Q10 (CO Q 10 PO)        Multiple Vitamins-Minerals (MULTIVITAMIN ADULT PO)        VITAMIN D, CHOLECALCIFEROL, PO Take by mouth daily           ALLERGIES:  Allergies   Allergen Reactions     Penicillin G Rash          PAST MEDICAL HISTORY:  Past Medical History:   Diagnosis Date     Diabetes (H)      HTN (hypertension)      Malignant neoplasm of sigmoid colon (H)          PAST SURGICAL HISTORY:  Past Surgical History:   Procedure Laterality Date     AS REMOVAL OF TONSILS,12+ Y/O       COLONOSCOPY       LAPAROSCOPIC COLECTOMY LOW ANTERIOR N/A 2016    Procedure: LAPAROSCOPIC COLECTOMY LOW ANTERIOR;  Surgeon: Oneil Spivey MD;  Location: [facility] OR     SIGMOIDOSCOPY FLEXIBLE N/A 2016    Procedure: SIGMOIDOSCOPY FLEXIBLE;  Surgeon: Oneil Spivey MD;  Location: [facility] OR         SOCIAL HISTORY:  Social History     Social History     Marital status:      Spouse name: N/A     Number of children: N/A     Years of education: N/A     Occupational History     Not on file.     Social History Main Topics     Smoking status: Never Smoker     Smokeless tobacco: Never Used     Alcohol use 0.0 oz/week     0 Standard drinks or equivalent per week      Comment: 0-1 drinks per week      Drug use: No     Sexual activity: Yes     Partners: Female     Birth control/ protection: None     Other Topics Concern     Parent/Sibling W/ Cabg, Mi Or Angioplasty Before 65f 55m? No     Social History Narrative     He has 4 sisters, one of whom has uterine cancer.  He has no family history of colorectal cancer.  He has no biological children.  He lives with his wife in [facility].  He has no smoking history.  He rarely drinks alcohol.  No illicit drug use.  He works repairing computers.      FAMILY HISTORY:  Family History   Problem Relation Age of Onset     Other Cancer Sister          PHYSICAL EXAM:  Vital signs:  /86  Pulse 80  Temp 98.5  F (36.9  C) (Oral)  Resp 16  Wt 69.4 kg (153 lb)  SpO2 97%  BMI 23.26 kg/m2   ECO  GENERAL/CONSTITUTIONAL: No acute distress.  EYES: No scleral icterus.  RESPIRATORY: Clear to auscultation bilaterally. No crackles or wheezing.   CARDIOVASCULAR: Regular rate and rhythm without  murmurs, gallops, or rubs.  GASTROINTESTINAL: No tenderness. The patient has normal bowel sounds. No guarding.  No distention.  MUSCULOSKELETAL: Warm and well-perfused, no edema.  NEUROLOGIC: Alert, oriented, answers questions appropriately.  INTEGUMENTARY: No jaundice.      LABS:  CBC RESULTS:   Recent Labs   Lab Test  11/13/17   0854   WBC  11.3*   RBC  5.05   HGB  15.8   HCT  45.0   MCV  89   MCH  31.3   MCHC  35.1   RDW  11.9   PLT  159     Recent Labs   Lab Test  11/13/17   0854  05/22/17   1100   NA  138  141   POTASSIUM  4.0  3.9   CHLORIDE  105  106   CO2  26  26   ANIONGAP  7  9   GLC  128*  105*   BUN  12  13   CR  0.93  0.82   BRISSA  9.2  9.5     Lab Results   Component Value Date    AST 40 11/13/2017     Lab Results   Component Value Date    ALT 54 11/13/2017     No results found for: BILICONJ   Lab Results   Component Value Date    BILITOTAL 0.9 11/13/2017     Lab Results   Component Value Date    ALBUMIN 4.3 11/13/2017     Lab Results   Component Value Date    PROTTOTAL 7.9 11/13/2017      Lab Results   Component Value Date    ALKPHOS 68 11/13/2017     Component      Latest Ref Rng & Units 11/11/2016 2/13/2017 5/22/2017 11/13/2017   CEA      0 - 2.5 ug/L 1.3 1.9 1.9 1.4         IMAGING:  CT c/a/p 11/13/17:  1. In this patient with adenocarcinoma of the colon status post  surgical resection, there is no convincing evidence for recurrent or  metastatic disease in the chest, abdomen or pelvis.  a. Stable hepatic hypodensities about the falciform ligament, favored  to represent focal fatty infiltration, and within hepatic segment 6,  most likely a cyst. Recommend continued attention on follow up.  b. Sub-4 mm pulmonary nodules are unchanged. No new or enlarging  pulmonary nodules.  c. Unchanged pericardiophrenic and pretracheal lymph nodes. No new or  enlarging lymphadenopathy.  2. Large colonic stool burden and formed stool in the distal small  bowel, suggestive of slow bowel  transit.         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  Normal mismatch repair protein expression.    Adjuvant chemotherapy is not recommended in a stage I tumor, and prognosis should be good.    -CT scan on 11/13/17 shows no evidence of recurrent of metastatic disease in the chest, abdomen, or pelvis  -due to some non-specific findings, as below, that should be monitored, will repeat CT scan and labs in 6 months  -RTC in 6 months  -he had his one-year colonoscopy with Dr. Spivey on 11/6/17.  There was no evidence of recurrence.  Next colonoscopy will be in 3 years from then (November 2020)  -he will have a flex sig in clinic with Dr. Spivey in 1 year from then (November 2018)    2) Hepatic hypodensities: seen at the falciform ligament, favored to represent focal fatty infiltration, and within hepatic segment 6, likely a cyst.  Follow-up is recommended.  -follow-up on scans in 6 months    3) Pulmonary nodules: sub-4 mm pulmonary nodules are unchanged.  No new or enlarging pulmonary nodules  -follow-up on scans in 6 months    4) Pericardiophrenic and pretracheal lymph nodes: unchanged.  No new or enlarging lymphadenopathy.  -follow-up on scans in 6 months    5) Constipation:   -he will try stool softeners or laxatives as needed    6) Leukocytosis: Mild, differential with lymphocytosis.  WBC has been slightly high previously as well.  May be the beginning of CLL or a monoclonal B-cell lymphocytosis versus reactive.  Scans without new or enlarging lymphadenopathy.  Hemoglobin and platelet count are normal.  -will monitor for now  -repeat CBC with differential at the next check, along  with peripheral smear and peripheral flow cytometry      I spent a total of 25 minutes with the patient, with over >50% of the time in counseling and/or coordination of care.       Meenakshi Blue MD  Hematology/Oncology  [facility]

## 2017-11-20 NOTE — NURSING NOTE
"Oncology Rooming Note    November 20, 2017 8:48 AM   Cipriano Parry is a 62 year old male who presents for:    Chief Complaint   Patient presents with     Oncology Clinic Visit     Return for Colon Ca , scan results      Initial Vitals: /86  Pulse 80  Temp 98.5  F (36.9  C) (Oral)  Resp 16  Wt 69.4 kg (153 lb)  SpO2 97%  BMI 23.26 kg/m2 Estimated body mass index is 23.26 kg/(m^2) as calculated from the following:    Height as of 9/6/17: 1.727 m (5' 8\").    Weight as of this encounter: 69.4 kg (153 lb). Body surface area is 1.82 meters squared.  No Pain (0) Comment: Data Unavailable   No LMP for male patient.  Allergies reviewed: Yes  Medications reviewed: Yes    Medications: Medication refills not needed today.  Pharmacy name entered into [employer]:    [id number] - [place], [place] - [address] [facility] - [facility] [address]    Clinical concerns: results  Mirza was notified.    7 minutes for nursing intake (face to face time)     Leah Figueredo MA              "

## 2017-11-20 NOTE — MR AVS SNAPSHOT
After Visit Summary   11/20/2017    Cipriano Parry    MRN: [id number]           Patient Information     Date Of Birth          1955        Visit Information        Provider Department      11/20/2017 8:45 AM Meenakshi Blue MD [facility]        Today's Diagnoses     Malignant neoplasm of sigmoid colon (H)    -  1    Lymphocytosis           Follow-ups after your visit        Your next 10 appointments already scheduled     May 14, 2018  8:45 AM CDT   LAB with  LAB   [facility] ([facility])    [address] [place] [address] [id number] [phone]           Please do not eat 10-12 hours before your appointment if you are coming in fasting for labs on lipids, cholesterol, or glucose (sugar). This does not apply to pregnant women. Water, hot tea and black coffee (with nothing added) are okay. Do not drink other fluids, diet soda or chew gum.            May 14, 2018  9:00 AM CDT   (Arrive by 8:45 AM)   CT CHEST/ABDOMEN/PELVIS W CONTRAST with UCCT1   [facility] [place] [facility] ([facility])    [address] [place] [address] [id number] [phone]           Please bring any scans or X-rays taken at other hospitals, if similar tests were done. Also bring a list of your medicines, including vitamins, minerals and over-the-counter drugs. It is safest to leave personal items at home.  Be sure to tell your doctor:   If you have any allergies.   If there s any chance you are pregnant.   If you are breastfeeding.   If you have any special needs.  You may have contrast for this exam. To prepare:   Do not eat or drink for 2 hours before your exam. If you need to take medicine, you may take it with small sips of water. (We may ask you to take liquid medicine as well.)   The day before your exam, drink extra fluids at least six 8-ounce glasses (unless your doctor tells you to restrict your  fluids).  Patients over 70 or patients with diabetes or kidney problems:   If you haven t had a blood test (creatinine test) within the last 30 days, go to your clinic or Diagnostic Imaging Department for this test.  If you have diabetes:   If your kidney function is normal, continue taking your metformin (Avandamet, Glucophage, Glucovance, Metaglip) on the day of your exam.   If your kidney function is abnormal, wait 48 hours before restarting this medicine.  You will have oral contrast for this exam:   You will drink the contrast at home. Get this from your clinic or Diagnostic Imaging Department. Please follow the directions given.  Please wear loose clothing, such as a sweat suit or jogging clothes. Avoid snaps, zippers and other metal. We may ask you to undress and put on a hospital gown.  If you have any questions, please call the Imaging Department where you will have your exam.            May 21, 2018  8:45 AM CDT   (Arrive by 8:30 AM)   Return Visit with Meenakshi Blue MD   [facility] ([facility])    [address] [place], [place] [id number] [phone]              Future tests that were ordered for you today     Open Future Orders        Priority Expected Expires Ordered    Blood Morphology Pathologist Review Routine 5/20/2018 11/20/2018 11/20/2017    Reticulocyte Count Routine 5/20/2018 11/20/2018 11/20/2017    Leukemia Lymphoma Evaluation (Flow Cytometry) Routine 5/20/2018 11/20/2018 11/20/2017    CT Chest/Abdomen/Pelvis w Contrast Routine 5/20/2018 11/20/2018 11/20/2017    CBC with platelets differential Routine 5/20/2018 11/20/2018 11/20/2017    Comprehensive metabolic panel Routine 5/20/2018 11/20/2018 11/20/2017    CEA Routine 5/20/2018 11/20/2018 11/20/2017            Who to contact     If you have questions or need follow up information about today's clinic visit or your schedule please contact [facility]  CL[facility] directly at [phone].  Normal or non-critical lab and imaging results will be communicated to you by MyChart, letter or phone within 4 business days after the clinic has received the results. If you do not hear from us within 7 days, please contact the clinic through [employer] or phone. If you have a critical or abnormal lab result, we will notify you by phone as soon as possible.  Submit refill requests through [employer] or call your pharmacy and they will forward the refill request to us. Please allow 3 business days for your refill to be completed.          Additional Information About Your Visit        [employer] Information     [employer] gives you secure access to your electronic health record. If you see a primary care provider, you can also send messages to your care team and make appointments. If you have questions, please call your primary care clinic.  If you do not have a primary care provider, please call [phone] and they will assist you.        Care EveryWhere ID     This is your Care EveryWhere ID. This could be used by other organizations to access your [facility] medical records  [id number]        Your Vitals Were     Pulse Temperature Respirations Pulse Oximetry BMI (Body Mass Index)       80 98.5  F (36.9  C) (Oral) 16 97% 23.26 kg/m2        Blood Pressure from Last 3 Encounters:   11/20/17 134/86   09/06/17 137/76   05/22/17 122/74    Weight from Last 3 Encounters:   11/20/17 69.4 kg (153 lb)   09/06/17 67 kg (147 lb 12 oz)   05/22/17 67.5 kg (148 lb 12.8 oz)               Primary Care Provider Office Phone # Fax #    Albino Sue Venegas [phone] [address] [place] [id number]        Equal Access to Services     EUNICE TERAN : Frank Hancock, baltazar mayers, champ galvan. So [facility] [phone].    ATENCIÓN: Si habla español, tiene a kemp disposición servicios gratuitos de asistencia  lingüística. Kori al [phone].    We comply with applicable federal civil rights laws and [place] laws. We do not discriminate on the basis of race, color, national origin, age, disability, sex, sexual orientation, or gender identity.            Thank you!     Thank you for choosing [facility]  for your care. Our goal is always to provide you with excellent care. Hearing back from our patients is one way we can continue to improve our services. Please take a few minutes to complete the written survey that you may receive in the mail after your visit with us. Thank you!             Your Updated Medication List - Protect others around you: Learn how to safely use, store and throw away your medicines at www.disposemymeds.org.          This list is accurate as of: 11/20/17  9:22 AM.  Always use your most recent med list.                   Brand Name Dispense Instructions for use Diagnosis    CO Q 10 PO       Malignant neoplasm of sigmoid colon (H)       lisinopril 5 MG tablet    PRINIVIL/ZESTRIL    7 tablet    Take 1 tablet (5 mg) by mouth daily    Type 2 diabetes mellitus with hyperglycemia (H)       metFORMIN 500 MG 24 hr tablet    GLUCOPHAGE-XR    90 tablet    Take 1 tablet (500 mg) by mouth every morning    Type 2 diabetes mellitus without complication, unspecified long term insulin use status (H)       MULTIVITAMIN ADULT PO           simvastatin 10 MG tablet    ZOCOR    7 tablet    Take 1 tablet (10 mg) by mouth At Bedtime    Type 2 diabetes mellitus with hyperglycemia (H)       VITAMIN D (CHOLECALCIFEROL) PO      Take by mouth daily

## 2017-12-05 DIAGNOSIS — E11.65 TYPE 2 DIABETES MELLITUS WITH HYPERGLYCEMIA (H): ICD-10-CM

## 2017-12-05 NOTE — TELEPHONE ENCOUNTER
Medication Detail      Disp Refills Start End SEAN   lisinopril (PRINIVIL/ZESTRIL) 5 MG tablet 7 tablet 0 10/2/2017  No   Sig: Take 1 tablet (5 mg) by mouth daily     Medication Detail      Disp Refills Start End SEAN   simvastatin (ZOCOR) 10 MG tablet 7 tablet 0 10/2/2017  No   Sig: Take 1 tablet (10 mg) by mouth At Bedtime     lov 9/6/17

## 2017-12-06 RX ORDER — SIMVASTATIN 10 MG
TABLET ORAL
Qty: 90 TABLET | Refills: 0 | Status: SHIPPED | OUTPATIENT
Start: 2017-12-06 | End: 2018-02-26

## 2017-12-06 RX ORDER — LISINOPRIL 5 MG/1
TABLET ORAL
Qty: 90 TABLET | Refills: 0 | Status: SHIPPED | OUTPATIENT
Start: 2017-12-06 | End: 2017-12-26

## 2017-12-06 NOTE — TELEPHONE ENCOUNTER
Prescriptions approved per [facility] Refill Protocol.  NACHO Westfall, RN          Requested Prescriptions   Pending Prescriptions Disp Refills     simvastatin (ZOCOR) 10 MG tablet [Pharmacy Med Name: SIMVASTATIN 10 MG TABLET] 90 tablet 2     Sig: TAKE 1 TABLET (10 MG) BY MOUTH AT BEDTIME    Statins Protocol Passed    12/5/2017  1:44 PM       Passed - LDL on file in past 12 months    Recent Labs   Lab Test  01/23/17   0854   LDL  55            Passed - No abnormal creatine kinase in past 12 months    No lab results found.         Passed - Recent or future visit with authorizing provider    Patient had office visit in the last year or has a visit in the next 30 days with authorizing provider.  See chart review.              Passed - Patient is age 18 or older        lisinopril (PRINIVIL/ZESTRIL) 5 MG tablet [Pharmacy Med Name: LISINOPRIL 5 MG TABLET] 90 tablet 2     Sig: TAKE 1 TABLET (5 MG) BY MOUTH DAILY    ACE Inhibitors (Including Combos) Protocol Passed    12/5/2017  1:44 PM       Passed - Blood pressure under 140/90    BP Readings from Last 3 Encounters:   11/20/17 134/86   09/06/17 137/76   05/22/17 122/74                Passed - Recent or future visit with authorizing provider's specialty    Patient had office visit in the last year or has a visit in the next 30 days with authorizing provider.  See chart review.              Passed - Patient is age 18 or older       Passed - Normal serum creatinine on file in past 12 months    Recent Labs   Lab Test  11/13/17   0854   CR  0.93            Passed - Normal serum potassium on file in past 12 months    Recent Labs   Lab Test  11/13/17   0854   POTASSIUM  4.0

## 2017-12-25 ENCOUNTER — MYC REFILL (OUTPATIENT)
Dept: FAMILY MEDICINE | Facility: CLINIC | Age: 62
End: 2017-12-25

## 2017-12-25 DIAGNOSIS — E11.65 TYPE 2 DIABETES MELLITUS WITH HYPERGLYCEMIA (H): ICD-10-CM

## 2017-12-25 RX ORDER — LISINOPRIL 5 MG/1
TABLET ORAL
Qty: 90 TABLET | Refills: 0 | Status: CANCELLED | OUTPATIENT
Start: 2017-12-25

## 2017-12-26 RX ORDER — LISINOPRIL 5 MG/1
TABLET ORAL
Qty: 90 TABLET | Refills: 2 | Status: SHIPPED | OUTPATIENT
Start: 2017-12-26 | End: 2018-03-19

## 2017-12-26 NOTE — TELEPHONE ENCOUNTER
Message from Bharatt:  Original authorizing provider: Albino Venegas MD, MD Cipriano Parry would like a refill of the following medications:  lisinopril (PRINIVIL/ZESTRIL) 5 MG tablet [Albino Venegas MD, MD]    Preferred pharmacy: [facility]/PHARMACY #[id number] [address] [facility]    Comment:  I am out of my lisinopril, I would like to  a refill on Tuesday

## 2017-12-26 NOTE — TELEPHONE ENCOUNTER
9/6/187 was last ov.  Prescription approved per [facility] Refill Protocol.  UNIQUE Garcia

## 2018-01-13 NOTE — NURSING NOTE
Chief Complaint   Patient presents with     Blood Draw     labs drawn by vpt by rn.  vs taken.     Labs drawn by vpt by rn.  Vs taken.    Mattie Boothe RN     Stable, continue lisinopril, held atenolol 1/12 for low/nl BP

## 2018-01-24 ENCOUNTER — MYC REFILL (OUTPATIENT)
Dept: FAMILY MEDICINE | Facility: CLINIC | Age: 63
End: 2018-01-24

## 2018-01-24 DIAGNOSIS — E11.9 TYPE 2 DIABETES MELLITUS WITHOUT COMPLICATION, UNSPECIFIED LONG TERM INSULIN USE STATUS: ICD-10-CM

## 2018-01-24 NOTE — TELEPHONE ENCOUNTER
"Requested Prescriptions   Pending Prescriptions Disp Refills     metFORMIN (GLUCOPHAGE-XR) 500 MG 24 hr tablet  Last Written Prescription Date:  9/6/2017  Last Fill Quantity: 90 tablet,  # refills: 1   Last Office Visit: 9/6/2017   Future Office Visit:    Next 5 appointments (look out 90 days)     Feb 20, 2018  9:00 AM CST   PHYSICAL with Albino Venegas MD   [facility] ([facility])    [address] [place] [id number] [phone]                90 tablet 1     Sig: Take 1 tablet (500 mg) by mouth every morning    Biguanide Agents Failed    1/24/2018  9:51 AM       Failed - Patient has documented LDL within the past 12 mos.    Recent Labs   Lab Test  01/23/17   0854   LDL  55          Failed - Patient has had a Microalbumin in the past 12 mos.    Recent Labs   Lab Test  01/23/17   0855   MICROL  <5   UMALCR  Unable to calculate due to low value          Passed - Patient's BP is less than 140/90    BP Readings from Last 3 Encounters:   11/20/17 134/86   09/06/17 137/76   05/22/17 122/74          Passed - Patient is age 10 or older       Passed - Patient has documented A1c within the specified period of time.    Recent Labs   Lab Test  09/06/17   0937   A1C  5.7          Passed - Patient's CR is NOT>1.4 OR Patient's EGFR is NOT<45 within past 12 mos.    Recent Labs   Lab Test  11/13/17   0908   GFRESTIMATED  86   GFRESTBLACK  >90     Recent Labs   Lab Test  11/13/17   0854   CR  0.93          Passed - Patient does NOT have a diagnosis of CHF.       Passed - Recent (6 mos) or future visit with authorizing provider's specialty    Patient had office visit in the last 6 months or has a visit in the next 30 days with authorizing provider.  See \"Patient Info\" tab in inbasket, or \"Choose Columns\" in Meds & Orders section of the refill encounter.              "

## 2018-01-24 NOTE — TELEPHONE ENCOUNTER
"Requested Prescriptions   Pending Prescriptions Disp Refills     metFORMIN (GLUCOPHAGE-XR) 500 MG 24 hr tablet [Pharmacy Med Name: METFORMIN  MG TABLET]  Last Written Prescription Date:  9/6/2017  Last Fill Quantity: 90 tablet,  # refills: 1   Last Office Visit: 9/6/2017   Future Office Visit:    Next 5 appointments (look out 90 days)     Feb 20, 2018  9:00 AM CST   PHYSICAL with Albino Venegas MD   [facility]    [address] [place] [id number] [phone]                90 tablet 1     Sig: TAKE 1 TABLET (500 MG) BY MOUTH EVERY MORNING    Biguanide Agents Failed    1/24/2018 10:12 AM       Failed - Patient has documented LDL within the past 12 mos.    Recent Labs   Lab Test  01/23/17   0854   LDL  55          Failed - Patient has had a Microalbumin in the past 12 mos.    Recent Labs   Lab Test  01/23/17   0855   MICROL  <5   UMALCR  Unable to calculate due to low value          Passed - Patient's BP is less than 140/90    BP Readings from Last 3 Encounters:   11/20/17 134/86   09/06/17 137/76   05/22/17 122/74          Passed - Patient is age 10 or older       Passed - Patient has documented A1c within the specified period of time.    Recent Labs   Lab Test  09/06/17   0937   A1C  5.7          Passed - Patient's CR is NOT>1.4 OR Patient's EGFR is NOT<45 within past 12 mos.    Recent Labs   Lab Test  11/13/17   0908   GFRESTIMATED  86   GFRESTBLACK  >90     Recent Labs   Lab Test  11/13/17   0854   CR  0.93          Passed - Patient does NOT have a diagnosis of CHF.       Passed - Recent (6 mos) or future visit with authorizing provider's specialty    Patient had office visit in the last 6 months or has a visit in the next 30 days with authorizing provider.  See \"Patient Info\" tab in inbasket, or \"Choose Columns\" in Meds & Orders section of the refill encounter.              "

## 2018-01-24 NOTE — TELEPHONE ENCOUNTER
Message from MyChart:  Original authorizing provider: Albino Venegas MD, MD Cipriano Parry would like a refill of the following medications:  metFORMIN (GLUCOPHAGE-XR) 500 MG 24 hr tablet [Albino Venegas MD, MD]    Preferred pharmacy: [facility]/PHARMACY #[id number] [place] [id number] [facility] AT [facility]    Comment:

## 2018-01-29 RX ORDER — METFORMIN HCL 500 MG
500 TABLET, EXTENDED RELEASE 24 HR ORAL EVERY MORNING
Qty: 90 TABLET | Refills: 0 | Status: SHIPPED | OUTPATIENT
Start: 2018-01-29 | End: 2018-02-26

## 2018-01-29 RX ORDER — METFORMIN HCL 500 MG
TABLET, EXTENDED RELEASE 24 HR ORAL
Qty: 90 TABLET | Refills: 1 | OUTPATIENT
Start: 2018-01-29

## 2018-01-29 NOTE — TELEPHONE ENCOUNTER
Patient has follow up appt.with PCP on 2/20 with labs. Med filled for 1 month.    Thanks! Shanice Villareal RN

## 2018-02-26 ENCOUNTER — OFFICE VISIT (OUTPATIENT)
Dept: FAMILY MEDICINE | Facility: CLINIC | Age: 63
End: 2018-02-26
Payer: COMMERCIAL

## 2018-02-26 VITALS
RESPIRATION RATE: 20 BRPM | SYSTOLIC BLOOD PRESSURE: 139 MMHG | HEART RATE: 76 BPM | TEMPERATURE: 97.7 F | BODY MASS INDEX: 23.42 KG/M2 | WEIGHT: 154.5 LBS | DIASTOLIC BLOOD PRESSURE: 87 MMHG | OXYGEN SATURATION: 98 % | HEIGHT: 68 IN

## 2018-02-26 DIAGNOSIS — Z00.00 ROUTINE GENERAL MEDICAL EXAMINATION AT A HEALTH CARE FACILITY: Primary | ICD-10-CM

## 2018-02-26 DIAGNOSIS — Z12.5 SCREENING FOR PROSTATE CANCER: ICD-10-CM

## 2018-02-26 DIAGNOSIS — E11.9 TYPE 2 DIABETES MELLITUS WITHOUT COMPLICATION, UNSPECIFIED LONG TERM INSULIN USE STATUS: ICD-10-CM

## 2018-02-26 LAB
ALBUMIN SERPL-MCNC: 4.4 G/DL (ref 3.4–5)
ALP SERPL-CCNC: 55 U/L (ref 40–150)
ALT SERPL W P-5'-P-CCNC: 55 U/L (ref 0–70)
ANION GAP SERPL CALCULATED.3IONS-SCNC: 10 MMOL/L (ref 3–14)
AST SERPL W P-5'-P-CCNC: 46 U/L (ref 0–45)
BILIRUB SERPL-MCNC: 0.8 MG/DL (ref 0.2–1.3)
BUN SERPL-MCNC: 16 MG/DL (ref 7–30)
CALCIUM SERPL-MCNC: 9.3 MG/DL (ref 8.5–10.1)
CHLORIDE SERPL-SCNC: 103 MMOL/L (ref 94–109)
CHOLEST SERPL-MCNC: 104 MG/DL
CO2 SERPL-SCNC: 23 MMOL/L (ref 20–32)
CREAT SERPL-MCNC: 0.89 MG/DL (ref 0.66–1.25)
CREAT UR-MCNC: 39 MG/DL
GFR SERPL CREATININE-BSD FRML MDRD: 86 ML/MIN/1.7M2
GLUCOSE SERPL-MCNC: 128 MG/DL (ref 70–99)
HBA1C MFR BLD: 6.5 % (ref 4.3–6)
HDLC SERPL-MCNC: 32 MG/DL
LDLC SERPL CALC-MCNC: 55 MG/DL
MICROALBUMIN UR-MCNC: <5 MG/L
MICROALBUMIN/CREAT UR: NORMAL MG/G CR (ref 0–17)
NONHDLC SERPL-MCNC: 72 MG/DL
POTASSIUM SERPL-SCNC: 4.1 MMOL/L (ref 3.4–5.3)
PROT SERPL-MCNC: 7.7 G/DL (ref 6.8–8.8)
PSA SERPL-ACNC: 1.22 UG/L (ref 0–4)
SODIUM SERPL-SCNC: 136 MMOL/L (ref 133–144)
TRIGL SERPL-MCNC: 83 MG/DL
TSH SERPL DL<=0.005 MIU/L-ACNC: 1.57 MU/L (ref 0.4–4)

## 2018-02-26 PROCEDURE — 80061 LIPID PANEL: CPT | Performed by: FAMILY MEDICINE

## 2018-02-26 PROCEDURE — 83036 HEMOGLOBIN GLYCOSYLATED A1C: CPT | Performed by: FAMILY MEDICINE

## 2018-02-26 PROCEDURE — 99396 PREV VISIT EST AGE 40-64: CPT | Performed by: FAMILY MEDICINE

## 2018-02-26 PROCEDURE — G0103 PSA SCREENING: HCPCS | Performed by: FAMILY MEDICINE

## 2018-02-26 PROCEDURE — 80053 COMPREHEN METABOLIC PANEL: CPT | Performed by: FAMILY MEDICINE

## 2018-02-26 PROCEDURE — 36415 COLL VENOUS BLD VENIPUNCTURE: CPT | Performed by: FAMILY MEDICINE

## 2018-02-26 PROCEDURE — 84443 ASSAY THYROID STIM HORMONE: CPT | Performed by: FAMILY MEDICINE

## 2018-02-26 PROCEDURE — 82043 UR ALBUMIN QUANTITATIVE: CPT | Performed by: FAMILY MEDICINE

## 2018-02-26 RX ORDER — SIMVASTATIN 10 MG
TABLET ORAL
Qty: 90 TABLET | Refills: 3 | Status: SHIPPED | OUTPATIENT
Start: 2018-02-26 | End: 2018-02-26

## 2018-02-26 RX ORDER — SIMVASTATIN 10 MG
TABLET ORAL
Qty: 90 TABLET | Refills: 3 | Status: SHIPPED | OUTPATIENT
Start: 2018-02-26 | End: 2019-03-22

## 2018-02-26 RX ORDER — METFORMIN HCL 500 MG
500 TABLET, EXTENDED RELEASE 24 HR ORAL EVERY MORNING
Qty: 90 TABLET | Refills: 3 | Status: SHIPPED | OUTPATIENT
Start: 2018-02-26 | End: 2018-02-26

## 2018-02-26 RX ORDER — METFORMIN HCL 500 MG
500 TABLET, EXTENDED RELEASE 24 HR ORAL EVERY MORNING
Qty: 90 TABLET | Refills: 3 | Status: SHIPPED | OUTPATIENT
Start: 2018-02-26 | End: 2019-03-14

## 2018-02-26 NOTE — MR AVS SNAPSHOT
After Visit Summary   2/26/2018    Cipriano Parry    MRN: [id number]           Patient Information     Date Of Birth          1955        Visit Information        Provider Department      2/26/2018 9:20 AM Albino Venegas MD [facility]        Today's Diagnoses     Routine general medical examination at a health care facility    -  1    Type 2 diabetes mellitus without complication, unspecified long term insulin use status (H)          Care Instructions      Preventive Health Recommendations  Male Ages 50 - 64    Yearly exam:             See your health care provider every year in order to  o   Review health changes.   o   Discuss preventive care.    o   Review your medicines if your doctor has prescribed any.     Have a cholesterol test every 5 years, or more frequently if you are at risk for high cholesterol/heart disease.     Have a diabetes test (fasting glucose) every three years. If you are at risk for diabetes, you should have this test more often.     Have a colonoscopy at age 50, or have a yearly FIT test (stool test). These exams will check for colon cancer.      Talk with your health care provider about whether or not a prostate cancer screening test (PSA) is right for you.    You should be tested each year for STDs (sexually transmitted diseases), if you re at risk.     Shots: Get a flu shot each year. Get a tetanus shot every 10 years.     Nutrition:    Eat at least 5 servings of fruits and vegetables daily.     Eat whole-grain bread, whole-wheat pasta and brown rice instead of white grains and rice.     Talk to your provider about Calcium and Vitamin D.     Lifestyle    Exercise for at least 150 minutes a week (30 minutes a day, 5 days a week). This will help you control your weight and prevent disease.     Limit alcohol to one drink per day.     No smoking.     Wear sunscreen to prevent skin cancer.     See your dentist every six months for an exam and  cleaning.     See your eye doctor every 1 to 2 years.            Follow-ups after your visit        Your next 10 appointments already scheduled     May 14, 2018  8:45 AM CDT   LAB with  LAB   [facility] ([facility])    [address] [place] [id number] [phone]           Please do not eat 10-12 hours before your appointment if you are coming in fasting for labs on lipids, cholesterol, or glucose (sugar). This does not apply to pregnant women. Water, hot tea and black coffee (with nothing added) are okay. Do not drink other fluids, diet soda or chew gum.            May 14, 2018  9:00 AM CDT   (Arrive by 8:45 AM)   CT CHEST/ABDOMEN/PELVIS W CONTRAST with UCCT1   [facility] ([facility])    [address] [place] [address] [id number] [phone]           Please bring any scans or X-rays taken at other hospitals, if similar tests were done. Also bring a list of your medicines, including vitamins, minerals and over-the-counter drugs. It is safest to leave personal items at home.  Be sure to tell your doctor:   If you have any allergies.   If there s any chance you are pregnant.   If you are breastfeeding.  You may have contrast for this exam. To prepare:   Do not eat or drink for 2 hours before your exam. If you need to take medicine, you may take it with small sips of water. (We may ask you to take liquid medicine as well.)   The day before your exam, drink extra fluids at least six 8-ounce glasses (unless your doctor tells you to restrict your fluids).   You will be given instructions on how to drink the contrast.  Patients over 70 or patients with diabetes or kidney problems:   If you haven t had a blood test (creatinine test) within the last 30 days, the Cardiologist/Radiologist may require you to get this test prior to your exam.  If you have diabetes:   Continue to take your metformin medication on  the day of your exam  Please wear loose clothing, such as a sweat suit or jogging clothes. Avoid snaps, zippers and other metal. We may ask you to undress and put on a hospital gown.  If you have any questions, please call the Imaging Department where you will have your exam.            May 21, 2018  8:45 AM CDT   (Arrive by 8:30 AM)   Return Visit with Meenakshi Blue MD   [facility] ([facility])    [address] [place] [address]  [address]  [facility] [id number] [phone]              Who to contact     If you have questions or need follow up information about today's clinic visit or your schedule please contact [facility] directly at [phone].  Normal or non-critical lab and imaging results will be communicated to you by MyChart, letter or phone within 4 business days after the clinic has received the results. If you do not hear from us within 7 days, please contact the clinic through [employer] or phone. If you have a critical or abnormal lab result, we will notify you by phone as soon as possible.  Submit refill requests through [employer] or call your pharmacy and they will forward the refill request to us. Please allow 3 business days for your refill to be completed.          Additional Information About Your Visit        Wiki[employer] Information     [employer] gives you secure access to your electronic health record. If you see a primary care provider, you can also send messages to your care team and make appointments. If you have questions, please call your primary care clinic.  If you do not have a primary care provider, please call [phone] and they will assist you.        Care EveryWhere ID     This is your Care EveryWhere ID. This could be used by other organizations to access your [place] medical records  [id number]        Your Vitals Were     Pulse Temperature Respirations Height Pulse Oximetry BMI (Body Mass Index)    76 97.7  F (36.5  " C) (Oral) 20 5' 7.75\" (1.721 m) 98% 23.67 kg/m2       Blood Pressure from Last 3 Encounters:   02/26/18 139/87   11/20/17 134/86   09/06/17 137/76    Weight from Last 3 Encounters:   02/26/18 154 lb 8 oz (70.1 kg)   11/20/17 153 lb (69.4 kg)   09/06/17 147 lb 12 oz (67 kg)              We Performed the Following     Albumin Random Urine Quantitative with Creat Ratio     Comprehensive metabolic panel     Hemoglobin A1c     Lipid panel reflex to direct LDL Fasting     TSH with free T4 reflex          Today's Medication Changes          These changes are accurate as of 2/26/18 10:02 AM.  If you have any questions, ask your nurse or doctor.               Start taking these medicines.        Dose/Directions    metFORMIN 500 MG 24 hr tablet   Commonly known as:  GLUCOPHAGE-XR   Used for:  Type 2 diabetes mellitus without complication, unspecified long term insulin use status (H)   Started by:  Albino Venegas MD        Dose:  500 mg   Take 1 tablet (500 mg) by mouth every morning   Quantity:  90 tablet   Refills:  3         These medicines have changed or have updated prescriptions.        Dose/Directions    * simvastatin 10 MG tablet   Commonly known as:  ZOCOR   This may have changed:  Another medication with the same name was added. Make sure you understand how and when to take each.   Used for:  Type 2 diabetes mellitus with hyperglycemia (H)   Changed by:  Albino Venegas MD        Dose:  10 mg   Take 1 tablet (10 mg) by mouth At Bedtime   Quantity:  7 tablet   Refills:  0       * simvastatin 10 MG tablet   Commonly known as:  ZOCOR   This may have changed:  You were already taking a medication with the same name, and this prescription was added. Make sure you understand how and when to take each.   Used for:  Type 2 diabetes mellitus without complication, unspecified long term insulin use status (H)   Changed by:  Albino Venegas MD        TAKE 1 TABLET (10 MG) BY MOUTH AT BEDTIME "   Quantity:  90 tablet   Refills:  3       * Notice:  This list has 2 medication(s) that are the same as other medications prescribed for you. Read the directions carefully, and ask your doctor or other care provider to review them with you.         Where to get your medicines      These medications were sent to [facility]/pharmacy #[id number] - [place] [id number] [facility] [address] [address]     Phone:  [phone]     metFORMIN 500 MG 24 hr tablet    simvastatin 10 MG tablet                Primary Care Provider Office Phone # Fax #    Albino Sue Venegas [phone] [address] [place] [address]        Equal Access to Services     EUNICE TERAN : Frank Hancock, wapao mayers, qarejita kaalmada reba, champ black. So [facility] [phone].    ATENCIÓN: Si habla español, tiene a kemp disposición servicios gratuitos de asistencia lingüística. Llame al [phone].    We comply with applicable federal civil rights laws and [place] laws. We do not discriminate on the basis of race, color, national origin, age, disability, sex, sexual orientation, or gender identity.            Thank you!     Thank you for choosing [facility]  for your care. Our goal is always to provide you with excellent care. Hearing back from our patients is one way we can continue to improve our services. Please take a few minutes to complete the written survey that you may receive in the mail after your visit with us. Thank you!             Your Updated Medication List - Protect others around you: Learn how to safely use, store and throw away your medicines at www.disposemymeds.org.          This list is accurate as of 2/26/18 10:02 AM.  Always use your most recent med list.                   Brand Name Dispense Instructions for use Diagnosis    CO Q 10 PO       Malignant neoplasm of sigmoid colon (H)        lisinopril 5 MG tablet    PRINIVIL/ZESTRIL    90 tablet    TAKE 1 TABLET (5 MG) BY MOUTH DAILY    Type 2 diabetes mellitus with hyperglycemia (H)       metFORMIN 500 MG 24 hr tablet    GLUCOPHAGE-XR    90 tablet    Take 1 tablet (500 mg) by mouth every morning    Type 2 diabetes mellitus without complication, unspecified long term insulin use status (H)       MULTIVITAMIN ADULT PO           * simvastatin 10 MG tablet    ZOCOR    7 tablet    Take 1 tablet (10 mg) by mouth At Bedtime    Type 2 diabetes mellitus with hyperglycemia (H)       * simvastatin 10 MG tablet    ZOCOR    90 tablet    TAKE 1 TABLET (10 MG) BY MOUTH AT BEDTIME    Type 2 diabetes mellitus without complication, unspecified long term insulin use status (H)       VITAMIN D (CHOLECALCIFEROL) PO      Take by mouth daily        * Notice:  This list has 2 medication(s) that are the same as other medications prescribed for you. Read the directions carefully, and ask your doctor or other care provider to review them with you.

## 2018-02-26 NOTE — NURSING NOTE
"Chief Complaint   Patient presents with     Physical     pt is fasting        Initial /87 (Cuff Size: Adult Regular)  Pulse 76  Temp 97.7  F (36.5  C) (Oral)  Resp 20  Ht 5' 7.75\" (1.721 m)  Wt 154 lb 8 oz (70.1 kg)  SpO2 98%  BMI 23.67 kg/m2 Estimated body mass index is 23.67 kg/(m^2) as calculated from the following:    Height as of this encounter: 5' 7.75\" (1.721 m).    Weight as of this encounter: 154 lb 8 oz (70.1 kg).  Medication Reconciliation: complete     Sue Gutierrez CMA      "

## 2018-02-26 NOTE — PROGRESS NOTES
SUBJECTIVE:   CC: Cipriano Parry is an 62 year old male who presents for preventative health visit.     Physical   Annual:     Getting at least 3 servings of Calcium per day::  Yes    Bi-annual eye exam::  Yes    Dental care twice a year::  NO    Sleep apnea or symptoms of sleep apnea::  None    Diet::  Diabetic    Frequency of exercise::  2-3 days/week    Duration of exercise::  15-30 minutes    Taking medications regularly::  Yes    Medication side effects::  Not applicable    Additional concerns today::  No            Today's PHQ-2 Score:   PHQ-2 ( 1999 [employer]) 2/25/2018   Q1: Little interest or pleasure in doing things 0   Q2: Feeling down, depressed or hopeless 0   PHQ-2 Score 0   Q1: Little interest or pleasure in doing things Not at all   Q2: Feeling down, depressed or hopeless Not at all   PHQ-2 Score 0     Abuse: Current or Past(Physical, Sexual or Emotional)- No  Do you feel safe in your environment - Yes    Social History   Substance Use Topics     Smoking status: Never Smoker     Smokeless tobacco: Never Used     Alcohol use 0.0 oz/week     0 Standard drinks or equivalent per week      Comment: 0-1 drinks per week      Alcohol Use 2/25/2018   If you drink alcohol, do you typically have greater than 3 drinks per day OR greater than 7 drinks per week?   No   No flowsheet data found.    Last PSA:   PSA   Date Value Ref Range Status   01/23/2017 1.10 0 - 4 ug/L Final     Comment:     Assay Method:  Chemiluminescence using Siemens Vista analyzer       Reviewed orders with patient. Reviewed health maintenance and updated orders accordingly - Yes  Labs reviewed in EPIC    Reviewed and updated as needed this visit by clinical staff    Reviewed and updated as needed this visit by Provider    Checking glucose at home. 130 in the morning blood sugars sometime.     Review of Systems  C: NEGATIVE for fever, chills, change in weight  I: NEGATIVE for worrisome rashes, moles or lesions  E: NEGATIVE for vision changes or  "irritation  ENT: NEGATIVE for ear, mouth and throat problems  R: NEGATIVE for significant cough or SOB  CV: NEGATIVE for chest pain, palpitations or peripheral edema  GI: NEGATIVE for nausea, abdominal pain, heartburn, or change in bowel habits   male: negative for dysuria, hematuria, decreased urinary stream, erectile dysfunction, urethral discharge  M: NEGATIVE for significant arthralgias or myalgia  N: NEGATIVE for weakness, dizziness or paresthesias  E: NEGATIVE for temperature intolerance, skin/hair changes  P: NEGATIVE for changes in mood or affect    OBJECTIVE:   /87 (Cuff Size: Adult Regular)  Pulse 76  Temp 97.7  F (36.5  C) (Oral)  Resp 20  Ht 5' 7.75\" (1.721 m)  Wt 154 lb 8 oz (70.1 kg)  SpO2 98%  BMI 23.67 kg/m2    Physical Exam  GENERAL: healthy, alert and no distress  EYES: Eyes grossly normal to inspection, PERRL and conjunctivae and sclerae normal  HENT: ear canals and TM's normal, nose and mouth without ulcers or lesions  NECK: no adenopathy, no asymmetry, masses, or scars and thyroid normal to palpation  RESP: lungs clear to auscultation - no rales, rhonchi or wheezes  CV: regular rate and rhythm, normal S1 S2, no S3 or S4, no murmur, click or rub, no peripheral edema and peripheral pulses strong  ABDOMEN: soft, nontender, no hepatosplenomegaly, no masses and bowel sounds normal   (male): normal male genitalia without lesions or urethral discharge, no hernia  MS: no gross musculoskeletal defects noted, no edema  SKIN: no suspicious lesions or rashes  NEURO: Normal strength and tone, mentation intact and speech normal  PSYCH: mentation appears normal, affect normal/bright    ASSESSMENT/PLAN:   1. Routine general medical examination at a health care facility      2. Type 2 diabetes mellitus without complication, unspecified long term insulin use status (H)  His A1c unfortunately is significantly higher than the last time but still Below the goal and currently at 6.5.  Last time it " "was 5.7.  We will stick with the same dose of medication for now  - Lipid panel reflex to direct LDL Fasting  - Hemoglobin A1c  - Comprehensive metabolic panel  - TSH with free T4 reflex  - Albumin Random Urine Quantitative with Creat Ratio  - metFORMIN (GLUCOPHAGE-XR) 500 MG 24 hr tablet; Take 1 tablet (500 mg) by mouth every morning  Dispense: 90 tablet; Refill: 3  - simvastatin (ZOCOR) 10 MG tablet; TAKE 1 TABLET (10 MG) BY MOUTH AT BEDTIME  Dispense: 90 tablet; Refill: 3    3. Screening for prostate cancer     - PSA, screen    COUNSELING:   Reviewed preventive health counseling, as reflected in patient instructions  Special attention given to:        Consider AAA screening for ages 65-75 and smoking history       Regular exercise       Healthy diet/nutrition       Vision screening       Hearing screening       Colon cancer screening       Prostate cancer screening         reports that he has never smoked. He has never used smokeless tobacco.    Estimated body mass index is 23.26 kg/(m^2) as calculated from the following:    Height as of 9/6/17: 5' 8\" (1.727 m).    Weight as of 11/20/17: 153 lb (69.4 kg).       Counseling Resources:  ATP IV Guidelines  Pooled Cohorts Equation Calculator  FRAX Risk Assessment  ICSI Preventive Guidelines  Dietary Guidelines for Americans, 2010  The New Daily's MyPlate  ASA Prophylaxis  Lung CA Screening    Albino Venegas MD, MD  [facility]  Answers for HPI/ROS submitted by the patient on 2/25/2018   PHQ-2 Score: 0    "

## 2018-03-19 ENCOUNTER — MYC REFILL (OUTPATIENT)
Dept: FAMILY MEDICINE | Facility: CLINIC | Age: 63
End: 2018-03-19

## 2018-03-19 DIAGNOSIS — E11.65 TYPE 2 DIABETES MELLITUS WITH HYPERGLYCEMIA (H): ICD-10-CM

## 2018-03-19 NOTE — TELEPHONE ENCOUNTER
"Requested Prescriptions   Pending Prescriptions Disp Refills     lisinopril (PRINIVIL/ZESTRIL) 5 MG tablet  Last Written Prescription Date:  12/26/2017  Last Fill Quantity: 90 tablet,  # refills: 2   Last Office Visit: 2/26/2018   Future Office Visit:      90 tablet 2     Sig: TAKE 1 TABLET (5 MG) BY MOUTH DAILY    ACE Inhibitors (Including Combos) Protocol Passed    3/19/2018  7:59 AM       Passed - Blood pressure under 140/90 in past 12 months    BP Readings from Last 3 Encounters:   02/26/18 139/87   11/20/17 134/86   09/06/17 137/76          Passed - Recent (12 mo) or future (30 days) visit within the authorizing provider's specialty    Patient had office visit in the last 12 months or has a visit in the next 30 days with authorizing provider or within the authorizing provider's specialty.  See \"Patient Info\" tab in inbasket, or \"Choose Columns\" in Meds & Orders section of the refill encounter.           Passed - Patient is age 18 or older       Passed - Normal serum creatinine on file in past 12 months    Recent Labs   Lab Test  02/26/18   1020   CR  0.89          Passed - Normal serum potassium on file in past 12 months    Recent Labs   Lab Test  02/26/18   1020   POTASSIUM  4.1               "

## 2018-03-19 NOTE — TELEPHONE ENCOUNTER
Message from Bharatt:  Original authorizing provider: Albino Venegas MD, MD Cipriano Parry would like a refill of the following medications:  lisinopril (PRINIVIL/ZESTRIL) 5 MG tablet [Albino Venegas MD, MD]    Preferred pharmacy: [facility]Y #[id number] [place] [id number] [facility]    Comment:  I am down to 10 pills of 5mg Lisinopril, my metformin and simvastatin were just refilled. I would like to pickup the refill at my [facility] pharmacy in [place]

## 2018-03-22 RX ORDER — LISINOPRIL 5 MG/1
TABLET ORAL
Qty: 90 TABLET | Refills: 2 | Status: SHIPPED | OUTPATIENT
Start: 2018-03-22 | End: 2019-03-22

## 2018-06-13 ENCOUNTER — RADIANT APPOINTMENT (OUTPATIENT)
Dept: CT IMAGING | Facility: CLINIC | Age: 63
End: 2018-06-13
Attending: INTERNAL MEDICINE
Payer: COMMERCIAL

## 2018-06-13 DIAGNOSIS — C18.7 MALIGNANT NEOPLASM OF SIGMOID COLON (H): ICD-10-CM

## 2018-06-13 DIAGNOSIS — D72.820 LYMPHOCYTOSIS: ICD-10-CM

## 2018-06-13 LAB
ALBUMIN SERPL-MCNC: 4.2 G/DL (ref 3.4–5)
ALP SERPL-CCNC: 62 U/L (ref 40–150)
ALT SERPL W P-5'-P-CCNC: 63 U/L (ref 0–70)
ANION GAP SERPL CALCULATED.3IONS-SCNC: 6 MMOL/L (ref 3–14)
AST SERPL W P-5'-P-CCNC: 40 U/L (ref 0–45)
BASOPHILS # BLD AUTO: 0 10E9/L (ref 0–0.2)
BASOPHILS NFR BLD AUTO: 0.3 %
BILIRUB SERPL-MCNC: 0.6 MG/DL (ref 0.2–1.3)
BUN SERPL-MCNC: 13 MG/DL (ref 7–30)
CALCIUM SERPL-MCNC: 8.9 MG/DL (ref 8.5–10.1)
CEA SERPL-MCNC: 2.4 UG/L (ref 0–2.5)
CHLORIDE SERPL-SCNC: 107 MMOL/L (ref 94–109)
CO2 SERPL-SCNC: 26 MMOL/L (ref 20–32)
COPATH REPORT: NORMAL
CREAT BLD-MCNC: 0.9 MG/DL (ref 0.66–1.25)
CREAT SERPL-MCNC: 0.94 MG/DL (ref 0.66–1.25)
DIFFERENTIAL METHOD BLD: ABNORMAL
EOSINOPHIL # BLD AUTO: 0.3 10E9/L (ref 0–0.7)
EOSINOPHIL NFR BLD AUTO: 2.9 %
ERYTHROCYTE [DISTWIDTH] IN BLOOD BY AUTOMATED COUNT: 12.1 % (ref 10–15)
GFR SERPL CREATININE-BSD FRML MDRD: 81 ML/MIN/1.7M2
GFR SERPL CREATININE-BSD FRML MDRD: 86 ML/MIN/1.7M2
GLUCOSE SERPL-MCNC: 166 MG/DL (ref 70–99)
HCT VFR BLD AUTO: 45.2 % (ref 40–53)
HGB BLD-MCNC: 15.8 G/DL (ref 13.3–17.7)
IMM GRANULOCYTES # BLD: 0 10E9/L (ref 0–0.4)
IMM GRANULOCYTES NFR BLD: 0.3 %
LYMPHOCYTES # BLD AUTO: 5.3 10E9/L (ref 0.8–5.3)
LYMPHOCYTES NFR BLD AUTO: 52.8 %
MCH RBC QN AUTO: 31.5 PG (ref 26.5–33)
MCHC RBC AUTO-ENTMCNC: 35 G/DL (ref 31.5–36.5)
MCV RBC AUTO: 90 FL (ref 78–100)
MONOCYTES # BLD AUTO: 0.5 10E9/L (ref 0–1.3)
MONOCYTES NFR BLD AUTO: 5.2 %
NEUTROPHILS # BLD AUTO: 3.9 10E9/L (ref 1.6–8.3)
NEUTROPHILS NFR BLD AUTO: 38.5 %
NRBC # BLD AUTO: 0 10*3/UL
NRBC BLD AUTO-RTO: 0 /100
PLATELET # BLD AUTO: 138 10E9/L (ref 150–450)
PLATELET # BLD EST: ABNORMAL 10*3/UL
POTASSIUM SERPL-SCNC: 4 MMOL/L (ref 3.4–5.3)
PROT SERPL-MCNC: 7.7 G/DL (ref 6.8–8.8)
RBC # BLD AUTO: 5.01 10E12/L (ref 4.4–5.9)
RETICS # AUTO: 79.2 10E9/L (ref 25–95)
RETICS/RBC NFR AUTO: 1.6 % (ref 0.5–2)
SODIUM SERPL-SCNC: 140 MMOL/L (ref 133–144)
WBC # BLD AUTO: 10.1 10E9/L (ref 4–11)

## 2018-06-13 RX ORDER — IOPAMIDOL 755 MG/ML
95 INJECTION, SOLUTION INTRAVASCULAR ONCE
Status: COMPLETED | OUTPATIENT
Start: 2018-06-13 | End: 2018-06-13

## 2018-06-13 RX ADMIN — IOPAMIDOL 95 ML: 755 INJECTION, SOLUTION INTRAVASCULAR at 08:19

## 2018-06-13 NOTE — DISCHARGE INSTRUCTIONS

## 2018-06-18 ENCOUNTER — ONCOLOGY VISIT (OUTPATIENT)
Dept: ONCOLOGY | Facility: CLINIC | Age: 63
End: 2018-06-18
Attending: INTERNAL MEDICINE
Payer: COMMERCIAL

## 2018-06-18 VITALS
WEIGHT: 163.58 LBS | HEART RATE: 73 BPM | OXYGEN SATURATION: 93 % | DIASTOLIC BLOOD PRESSURE: 91 MMHG | RESPIRATION RATE: 16 BRPM | TEMPERATURE: 97.2 F | BODY MASS INDEX: 25.06 KG/M2 | SYSTOLIC BLOOD PRESSURE: 155 MMHG

## 2018-06-18 DIAGNOSIS — C18.7 MALIGNANT NEOPLASM OF SIGMOID COLON (H): Primary | ICD-10-CM

## 2018-06-18 PROCEDURE — G0463 HOSPITAL OUTPT CLINIC VISIT: HCPCS | Mod: ZF

## 2018-06-18 PROCEDURE — 99214 OFFICE O/P EST MOD 30 MIN: CPT | Mod: ZP | Performed by: INTERNAL MEDICINE

## 2018-06-18 ASSESSMENT — PAIN SCALES - GENERAL: PAINLEVEL: NO PAIN (0)

## 2018-06-18 NOTE — PROGRESS NOTES
[facility]    Hematology/Oncology Established Patient Note      Today's Date: 6/18/18    Reason for Follow-up: rectosigmoid carcinoma      HISTORY OF PRESENT ILLNESS: Cipriano Parry is a 62 year old male who presents with rectosigmoid cancer.  He underwent laparoscopic-assisted low anterior resection by Dr. Spivey on 11/16/16.  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  Normal mismatch repair protein expression.      He had a basal cell carcinoma removed from his right temple, and follows with dermatology.      INTERIM HISTORY: Cipriano comes in for follow-up today.   He says that he is feeling well.  He went on vacation with his wife in May 2018 to [facility].  He denies pain, nausea/vomiting, or diarrhea/constipation.  He helped his sister put up a pool in [place] yesterday and drove back to the [facility] this morning, he is is feeling tired today.      REVIEW OF SYSTEMS:   14 point ROS was reviewed and is negative other than as noted above in HPI.       HOME MEDICATIONS:  Current Outpatient Prescriptions   Medication Sig Dispense Refill     Coenzyme Q10 (CO Q 10 PO)        lisinopril (PRINIVIL/ZESTRIL) 5 MG tablet TAKE 1 TABLET (5 MG) BY MOUTH DAILY 90 tablet 2     metFORMIN (GLUCOPHAGE-XR) 500 MG 24 hr tablet Take 1 tablet (500 mg) by mouth every morning 90 tablet 3     Multiple Vitamins-Minerals (MULTIVITAMIN ADULT PO)        simvastatin (ZOCOR) 10 MG tablet TAKE 1 TABLET (10 MG) BY MOUTH AT BEDTIME 90 tablet 3     simvastatin (ZOCOR) 10 MG tablet Take 1 tablet (10 mg) by mouth At Bedtime 7 tablet 0     VITAMIN D, CHOLECALCIFEROL, PO Take by mouth daily           ALLERGIES:  Allergies   Allergen Reactions     Penicillin G Rash         PAST MEDICAL HISTORY:  Past Medical History:   Diagnosis Date     Diabetes  (H)      HTN (hypertension)      Malignant neoplasm of sigmoid colon (H)          PAST SURGICAL HISTORY:  Past Surgical History:   Procedure Laterality Date     AS REMOVAL OF TONSILS,12+ Y/O       COLONOSCOPY       LAPAROSCOPIC COLECTOMY LOW ANTERIOR N/A 2016    Procedure: LAPAROSCOPIC COLECTOMY LOW ANTERIOR;  Surgeon: Oneil Spivey MD;  Location: [facility] OR     SIGMOIDOSCOPY FLEXIBLE N/A 2016    Procedure: SIGMOIDOSCOPY FLEXIBLE;  Surgeon: Oneil Spivey MD;  Location: [facility] OR         SOCIAL HISTORY:  Social History     Social History     Marital status:      Spouse name: N/A     Number of children: N/A     Years of education: N/A     Occupational History     Not on file.     Social History Main Topics     Smoking status: Never Smoker     Smokeless tobacco: Never Used     Alcohol use 0.0 oz/week     0 Standard drinks or equivalent per week      Comment: 0-1 drinks per week      Drug use: No     Sexual activity: Yes     Partners: Female     Birth control/ protection: None     Other Topics Concern     Parent/Sibling W/ Cabg, Mi Or Angioplasty Before 65f 55m? No     Social History Narrative     He has 4 sisters, one of whom has uterine cancer.  He has no family history of colorectal cancer.  He has no biological children.  He lives with his wife in [facility].  He has no smoking history.  He rarely drinks alcohol.  No illicit drug use.  He works repairing computers.      FAMILY HISTORY:  Family History   Problem Relation Age of Onset     Other Cancer Sister          PHYSICAL EXAM:  Vital signs:  BP (!) 155/91  Pulse 73  Temp 97.2  F (36.2  C) (Oral)  Resp 16  Wt 74.2 kg (163 lb 9.3 oz)  SpO2 93%  BMI 25.06 kg/m2   ECO  GENERAL/CONSTITUTIONAL: No acute distress.  EYES: No scleral icterus.  RESPIRATORY: Clear to auscultation bilaterally. No crackles or wheezing.   CARDIOVASCULAR: Regular rate and rhythm without murmurs, gallops, or rubs.  GASTROINTESTINAL: No tenderness. The patient has  normal bowel sounds. No guarding.  No distention.  MUSCULOSKELETAL: Warm and well-perfused, no edema.  NEUROLOGIC: Alert, oriented, answers questions appropriately.  INTEGUMENTARY: No jaundice.      LABS:  CBC RESULTS:   Recent Labs   Lab Test  06/13/18   0754   WBC  10.1   RBC  5.01   HGB  15.8   HCT  45.2   MCV  90   MCH  31.5   MCHC  35.0   RDW  12.1   PLT  138*     Recent Labs   Lab Test  06/13/18   0754  02/26/18   1020   NA  140  136   POTASSIUM  4.0  4.1   CHLORIDE  107  103   CO2  26  23   ANIONGAP  6  10   GLC  166*  128*   BUN  13  16   CR  0.94  0.89   BRISSA  8.9  9.3     Lab Results   Component Value Date    AST 40 06/13/2018     Lab Results   Component Value Date    ALT 63 06/13/2018     No results found for: BILICONJ   Lab Results   Component Value Date    BILITOTAL 0.6 06/13/2018     Lab Results   Component Value Date    ALBUMIN 4.2 06/13/2018     Lab Results   Component Value Date    PROTTOTAL 7.7 06/13/2018      Lab Results   Component Value Date    ALKPHOS 62 06/13/2018     Component      Latest Ref Rng & Units 11/11/2016 2/13/2017 5/22/2017 11/13/2017   CEA      0 - 2.5 ug/L 1.3 1.9 1.9 1.4     Component      Latest Ref Rng & Units 6/13/2018   CEA      0 - 2.5 ug/L 2.4     Peripheral smear 6/13/18:  FINAL DIAGNOSIS:   Peripheral blood smear:        Non-anemic blood        Lymphocyte count at the upper end of normal range (see comment)        Slight thrombocytopenia         IMAGING:  CT c/a./p 6/13/18:  In this patient with history of colorectal cancer:  1. No convincing evidence of significant disease in the chest,  abdomen, or pelvis.  2. Small pulmonary nodules are unchanged since at least 11/9/2016.  3. Subcentimeter lesion in the right hepatic lobe, too small to  characterize, but stable since at least 11/9/2016. Focal  hypoattenuation adjacent to the fossa for ligamentum teres again felt  to represent focal steatosis.         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  Normal mismatch repair protein expression.    Adjuvant chemotherapy is not recommended in a stage I tumor, and prognosis should be good.    -CT scan on 6/13/18 shows no evidence of recurrent or metastatic disease in the chest, abdomen, or pelvis  -RTC in 1 year  -he had his one-year colonoscopy with Dr. Spivey on 11/6/17.  There was no evidence of recurrence.  Next colonoscopy will be in 3 years from then (November 2020)  -he will have a flex sig in clinic with Dr. Spivey in 1 year from then (November 2018)    2) Hepatic hypodensities: seen at the falciform ligament, favored to represent focal fatty infiltration, and within hepatic segment 6, likely a cyst.  Follow-up is recommended.  CT scan on 6/13/18 showed stability since November 2016.  -follow-up on scans in 1 year  -if still stable in 1 year, can stop following routine scans    3) Pulmonary nodules: sub-4 mm pulmonary nodules are unchanged and stable since November 2016.  No new or enlarging pulmonary nodules  -follow-up on scans in 1 year  -if still stable in 1 year, can stop following routine scans    4) Pericardiophrenic and pretracheal lymph nodes: unchanged.  No new or enlarging lymphadenopathy.  -follow-up on scans in 1 year  -if still stable in 1 year, can stop following routine scans    5) Thrombocytopenia: mild  -monitor CBC for now      I spent a total of 25 minutes with the patient, with over >50% of the time in counseling and/or coordination of care.       Meenakshi Blue MD  Hematology/Oncology  [facility]

## 2018-06-18 NOTE — NURSING NOTE
"Oncology Rooming Note    June 18, 2018 8:54 AM   Cipriano Parry is a 62 year old male who presents for:    Chief Complaint   Patient presents with     Oncology Clinic Visit     Colon Ca , CT results      Initial Vitals: BP (!) 155/91  Pulse 73  Temp 97.2  F (36.2  C) (Oral)  Resp 16  Wt 74.2 kg (163 lb 9.3 oz)  SpO2 93%  BMI 25.06 kg/m2 Estimated body mass index is 25.06 kg/(m^2) as calculated from the following:    Height as of 2/26/18: 1.721 m (5' 7.75\").    Weight as of this encounter: 74.2 kg (163 lb 9.3 oz). Body surface area is 1.88 meters squared.  No Pain (0) Comment: Data Unavailable   No LMP for male patient.  Allergies reviewed: Yes  Medications reviewed: Yes    Medications: Medication refills not needed today.  Pharmacy name entered into [employer]: [id number] [address] [facility]    Clinical concerns: Results  Blue was notified.    10 minutes for nursing intake (face to face time)     Leah Figueredo MA              "

## 2018-06-18 NOTE — MR AVS SNAPSHOT
After Visit Summary   6/18/2018    Cipriano Parry    MRN: [id number]           Patient Information     Date Of Birth          1955        Visit Information        Provider Department      6/18/2018 8:45 AM Meenakshi Blue MD [facility]        Today's Diagnoses     Malignant neoplasm of sigmoid colon (H)    -  1       Follow-ups after your visit        Your next 10 appointments already scheduled     Jun 14, 2019  8:30 AM CDT   [facility] Draw with  [facility]   [facility] ([facility])    [address]  [address]  [facility] [facility] [address] [phone]            Jun 14, 2019  9:00 AM CDT   CT CHEST/ABDOMEN/PELVIS W CONTRAST with UCCT1   [facility] CT ([facility])    [address]  1st Floor  [facility] [id number] [phone]           Please bring any scans or X-rays taken at other hospitals, if similar tests were done. Also bring a list of your medicines, including vitamins, minerals and over-the-counter drugs. It is safest to leave personal items at home.  Be sure to tell your doctor:   If you have any allergies.   If there s any chance you are pregnant.   If you are breastfeeding.  How to prepare:   Do not eat or drink for 2 hours before your exam. If you need to take medicine, you may take it with small sips of water. (We may ask you to take liquid medicine as well.)   Please wear loose clothing, such as a sweat suit or jogging clothes. Avoid snaps, zippers and other metal. We may ask you to undress and put on a hospital gown.  Please arrive 30 minutes early for your CT. Once in the department you might be asked to drink water 15-20 minutes prior to your exam.  If indicated you may be asked to drink an oral contrast in advance of your CT.  If this is the case, the imaging team will let you know or be in contact with you prior to your appointment  Patients  over 70 or patients with diabetes or kidney problems:   If you haven t had a blood test (creatinine test) within the last 30 days, the Cardiologist/Radiologist may require you to get this test prior to your exam.  If you have diabetes:   Continue to take your metformin medication on the day of your exam  If you have any questions, please call the Imaging Department where you will have your exam.            Jun 17, 2019  8:45 AM CDT   (Arrive by 8:30 AM)   Return Visit with Meenakshi Blue MD   [facility] ([facility])    [address] [place] [address]  [address] [place] [address] [phone]              Future tests that were ordered for you today     Open Future Orders        Priority Expected Expires Ordered    CBC with platelets differential Routine 6/18/2019 6/18/2019 6/18/2018    Comprehensive metabolic panel Routine 6/18/2019 6/18/2019 6/18/2018    CEA Routine 6/18/2019 6/18/2019 6/18/2018    CT Chest/Abdomen/Pelvis w Contrast Routine 6/18/2019 6/18/2019 6/18/2018            Who to contact     If you have questions or need follow up information about today's clinic visit or your schedule please contact [facility] directly at [phone].  Normal or non-critical lab and imaging results will be communicated to you by [email or web address], letter or phone within 4 business days after the clinic has received the results. If you do not hear from us within 7 days, please contact the clinic through [employer] or phone. If you have a critical or abnormal lab result, we will notify you by phone as soon as possible.  Submit refill requests through [employer] or call your pharmacy and they will forward the refill request to us. Please allow 3 business days for your refill to be completed.          Additional Information About Your Visit        [employer] Information     [employer] gives you secure access to your electronic health record. If you see a primary care  provider, you can also send messages to your care team and make appointments. If you have questions, please call your primary care clinic.  If you do not have a primary care provider, please call [phone] and they will assist you.        Care EveryWhere ID     This is your Care EveryWhere ID. This could be used by other organizations to access your [facility] medical records  [id number]        Your Vitals Were     Pulse Temperature Respirations Pulse Oximetry BMI (Body Mass Index)       73 97.2  F (36.2  C) (Oral) 16 93% 25.06 kg/m2        Blood Pressure from Last 3 Encounters:   06/18/18 (!) 155/91   02/26/18 139/87   11/20/17 134/86    Weight from Last 3 Encounters:   06/18/18 74.2 kg (163 lb 9.3 oz)   02/26/18 70.1 kg (154 lb 8 oz)   11/20/17 69.4 kg (153 lb)               Primary Care Provider Office Phone # Fax #    Albino Sue Venegas [phone] [address] [place], [place] [id number]        Equal Access to Services     JOSE ARMANDO TERAN : Hadii riki lynn Sorufina, wacoronada riana, qaybta kaalbritt britton, champ crum . So [facility] [phone].    ATENCIÓN: Si habla español, tiene a kemp disposición servicios gratuitos de asistencia lingüística. [facility] [phone].    We comply with applicable federal civil rights laws and [place] laws. We do not discriminate on the basis of race, color, national origin, age, disability, sex, sexual orientation, or gender identity.            Thank you!     Thank you for choosing [facility]  for your care. Our goal is always to provide you with excellent care. Hearing back from our patients is one way we can continue to improve our services. Please take a few minutes to complete the written survey that you may receive in the mail after your visit with us. Thank you!             Your Updated Medication List - Protect others around you: Learn how to safely use, store and throw away your medicines  at www.disposemymeds.org.          This list is accurate as of 6/18/18 11:59 PM.  Always use your most recent med list.                   Brand Name Dispense Instructions for use Diagnosis    CO Q 10 PO       Malignant neoplasm of sigmoid colon (H)       lisinopril 5 MG tablet    PRINIVIL/ZESTRIL    90 tablet    TAKE 1 TABLET (5 MG) BY MOUTH DAILY    Type 2 diabetes mellitus with hyperglycemia (H)       metFORMIN 500 MG 24 hr tablet    GLUCOPHAGE-XR    90 tablet    Take 1 tablet (500 mg) by mouth every morning    Type 2 diabetes mellitus without complication, unspecified long term insulin use status (H)       MULTIVITAMIN ADULT PO           * simvastatin 10 MG tablet    ZOCOR    7 tablet    Take 1 tablet (10 mg) by mouth At Bedtime    Type 2 diabetes mellitus with hyperglycemia (H)       * simvastatin 10 MG tablet    ZOCOR    90 tablet    TAKE 1 TABLET (10 MG) BY MOUTH AT BEDTIME    Type 2 diabetes mellitus without complication, unspecified long term insulin use status (H)       VITAMIN D (CHOLECALCIFEROL) PO      Take by mouth daily        * Notice:  This list has 2 medication(s) that are the same as other medications prescribed for you. Read the directions carefully, and ask your doctor or other care provider to review them with you.

## 2018-06-18 NOTE — LETTER
6/18/2018       RE: Cipriano Parry  [address]  [facility] [address]     Dear Colleague,    Thank you for referring your patient, Cipriano Parry, to the [facility]. Please see a copy of my visit note below.    [facility]    Hematology/Oncology Established Patient Note      Today's Date: 6/18/18    Reason for Follow-up: rectosigmoid carcinoma      HISTORY OF PRESENT ILLNESS: Cipriano Parry is a 62 year old male who presents with rectosigmoid cancer.  He underwent laparoscopic-assisted low anterior resection by Dr. Spivey on 11/16/16.  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  Normal mismatch repair protein expression.      He had a basal cell carcinoma removed from his right temple, and follows with dermatology.      INTERIM HISTORY: Cipriano comes in for follow-up today.   He says that he is feeling well.  He went on vacation with his wife in May 2018 to [facility].  He denies pain, nausea/vomiting, or diarrhea/constipation.  He helped his sister put up a pool in [place] yesterday and drove back to the [facility] this morning, he is is feeling tired today.      REVIEW OF SYSTEMS:   14 point ROS was reviewed and is negative other than as noted above in HPI.       HOME MEDICATIONS:  Current Outpatient Prescriptions   Medication Sig Dispense Refill     Coenzyme Q10 (CO Q 10 PO)        lisinopril (PRINIVIL/ZESTRIL) 5 MG tablet TAKE 1 TABLET (5 MG) BY MOUTH DAILY 90 tablet 2     metFORMIN (GLUCOPHAGE-XR) 500 MG 24 hr tablet Take 1 tablet (500 mg) by mouth every morning 90 tablet 3     Multiple Vitamins-Minerals (MULTIVITAMIN ADULT PO)        simvastatin (ZOCOR) 10 MG tablet TAKE 1 TABLET (10 MG) BY MOUTH AT BEDTIME 90 tablet 3     simvastatin (ZOCOR) 10 MG tablet Take 1 tablet (10 mg) by  mouth At Bedtime 7 tablet 0     VITAMIN D, CHOLECALCIFEROL, PO Take by mouth daily           ALLERGIES:  Allergies   Allergen Reactions     Penicillin G Rash         PAST MEDICAL HISTORY:  Past Medical History:   Diagnosis Date     Diabetes (H)      HTN (hypertension)      Malignant neoplasm of sigmoid colon (H)          PAST SURGICAL HISTORY:  Past Surgical History:   Procedure Laterality Date     AS REMOVAL OF TONSILS,12+ Y/O       COLONOSCOPY       LAPAROSCOPIC COLECTOMY LOW ANTERIOR N/A 2016    Procedure: LAPAROSCOPIC COLECTOMY LOW ANTERIOR;  Surgeon: Oneil Spivey MD;  Location: [facility] OR     SIGMOIDOSCOPY FLEXIBLE N/A 2016    Procedure: SIGMOIDOSCOPY FLEXIBLE;  Surgeon: Oneil Spivey MD;  Location: [facility] OR         SOCIAL HISTORY:  Social History     Social History     Marital status:      Spouse name: N/A     Number of children: N/A     Years of education: N/A     Occupational History     Not on file.     Social History Main Topics     Smoking status: Never Smoker     Smokeless tobacco: Never Used     Alcohol use 0.0 oz/week     0 Standard drinks or equivalent per week      Comment: 0-1 drinks per week      Drug use: No     Sexual activity: Yes     Partners: Female     Birth control/ protection: None     Other Topics Concern     Parent/Sibling W/ Cabg, Mi Or Angioplasty Before 65f 55m? No     Social History Narrative     He has 4 sisters, one of whom has uterine cancer.  He has no family history of colorectal cancer.  He has no biological children.  He lives with his wife in [facility].  He has no smoking history.  He rarely drinks alcohol.  No illicit drug use.  He works repairing computers.      FAMILY HISTORY:  Family History   Problem Relation Age of Onset     Other Cancer Sister          PHYSICAL EXAM:  Vital signs:  BP (!) 155/91  Pulse 73  Temp 97.2  F (36.2  C) (Oral)  Resp 16  Wt 74.2 kg (163 lb 9.3 oz)  SpO2 93%  BMI 25.06 kg/m2   ECO  GENERAL/CONSTITUTIONAL: No  acute distress.  EYES: No scleral icterus.  RESPIRATORY: Clear to auscultation bilaterally. No crackles or wheezing.   CARDIOVASCULAR: Regular rate and rhythm without murmurs, gallops, or rubs.  GASTROINTESTINAL: No tenderness. The patient has normal bowel sounds. No guarding.  No distention.  MUSCULOSKELETAL: Warm and well-perfused, no edema.  NEUROLOGIC: Alert, oriented, answers questions appropriately.  INTEGUMENTARY: No jaundice.      LABS:  CBC RESULTS:   Recent Labs   Lab Test  06/13/18   0754   WBC  10.1   RBC  5.01   HGB  15.8   HCT  45.2   MCV  90   MCH  31.5   MCHC  35.0   RDW  12.1   PLT  138*     Recent Labs   Lab Test  06/13/18   0754  02/26/18   1020   NA  140  136   POTASSIUM  4.0  4.1   CHLORIDE  107  103   CO2  26  23   ANIONGAP  6  10   GLC  166*  128*   BUN  13  16   CR  0.94  0.89   BRISSA  8.9  9.3     Lab Results   Component Value Date    AST 40 06/13/2018     Lab Results   Component Value Date    ALT 63 06/13/2018     No results found for: BILICONJ   Lab Results   Component Value Date    BILITOTAL 0.6 06/13/2018     Lab Results   Component Value Date    ALBUMIN 4.2 06/13/2018     Lab Results   Component Value Date    PROTTOTAL 7.7 06/13/2018      Lab Results   Component Value Date    ALKPHOS 62 06/13/2018     Component      Latest Ref Rng & Units 11/11/2016 2/13/2017 5/22/2017 11/13/2017   CEA      0 - 2.5 ug/L 1.3 1.9 1.9 1.4     Component      Latest Ref Rng & Units 6/13/2018   CEA      0 - 2.5 ug/L 2.4     Peripheral smear 6/13/18:  FINAL DIAGNOSIS:   Peripheral blood smear:        Non-anemic blood        Lymphocyte count at the upper end of normal range (see comment)        Slight thrombocytopenia         IMAGING:  CT c/a./p 6/13/18:  In this patient with history of colorectal cancer:  1. No convincing evidence of significant disease in the chest,  abdomen, or pelvis.  2. Small pulmonary nodules are unchanged since at least 11/9/2016.  3. Subcentimeter lesion in the right hepatic lobe, too  small to  characterize, but stable since at least 11/9/2016. Focal  hypoattenuation adjacent to the fossa for ligamentum teres again felt  to represent focal steatosis.         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  Normal mismatch repair protein expression.    Adjuvant chemotherapy is not recommended in a stage I tumor, and prognosis should be good.    -CT scan on 6/13/18 shows no evidence of recurrent or metastatic disease in the chest, abdomen, or pelvis  -RTC in 1 year  -he had his one-year colonoscopy with Dr. Spivey on 11/6/17.  There was no evidence of recurrence.  Next colonoscopy will be in 3 years from then (November 2020)  -he will have a flex sig in clinic with Dr. Spivey in 1 year from then (November 2018)    2) Hepatic hypodensities: seen at the falciform ligament, favored to represent focal fatty infiltration, and within hepatic segment 6, likely a cyst.  Follow-up is recommended.  CT scan on 6/13/18 showed stability since November 2016.  -follow-up on scans in 1 year  -if still stable in 1 year, can stop following routine scans    3) Pulmonary nodules: sub-4 mm pulmonary nodules are unchanged and stable since November 2016.  No new or enlarging pulmonary nodules  -follow-up on scans in 1 year  -if still stable in 1 year, can stop following routine scans    4) Pericardiophrenic and pretracheal lymph nodes: unchanged.  No new or enlarging lymphadenopathy.  -follow-up on scans in 1 year  -if still stable in 1 year, can stop following routine scans    5) Thrombocytopenia: mild  -monitor CBC for now      I spent a total of 25 minutes with the patient, with over >50% of  the time in counseling and/or coordination of care.       Meenakshi Blue MD  Hematology/Oncology  [facility]

## 2018-06-19 LAB — COPATH REPORT: NORMAL

## 2018-11-19 ENCOUNTER — TRANSFERRED RECORDS (OUTPATIENT)
Dept: HEALTH INFORMATION MANAGEMENT | Facility: CLINIC | Age: 63
End: 2018-11-19

## 2018-11-27 ENCOUNTER — TELEPHONE (OUTPATIENT)
Dept: SURGERY | Facility: CLINIC | Age: 63
End: 2018-11-27

## 2018-11-27 NOTE — TELEPHONE ENCOUNTER
Patient called inquiring when he is due for his next colonoscopy?  Informed patient per his colonoscopy report in 11/2017 that he is due in 3 years (2020).  Informed patient someone will reach out closer to that time for scheduling.

## 2019-03-14 DIAGNOSIS — E11.9 TYPE 2 DIABETES MELLITUS WITHOUT COMPLICATION (H): ICD-10-CM

## 2019-03-14 NOTE — TELEPHONE ENCOUNTER
Requested Prescriptions   Pending Prescriptions Disp Refills     metFORMIN (GLUCOPHAGE-XR) 500 MG 24 hr tablet [Pharmacy Med Name: METFORMIN  MG TABLET]  Last Written Prescription Date:  2/26/2018  Last Fill Quantity: 90 tablet,  # refills: 3   Last Office Visit: 2/26/2018   Future Office Visit:    Next 5 appointments (look out 90 days)    Mar 22, 2019  9:40 AM CDT  PHYSICAL with Albino Venegas MD  [facility] [address] [place] [id number] [phone]        90 tablet 3     Sig: TAKE 1 TABLET (500 MG) BY MOUTH EVERY MORNING    Biguanide Agents Failed - 3/14/2019  2:53 AM       Failed - Blood pressure less than 140/90 in past 6 months    BP Readings from Last 3 Encounters:   06/18/18 (!) 155/91   02/26/18 139/87   11/20/17 134/86          Failed - Patient has documented LDL within the past 12 mos.    Recent Labs   Lab Test 02/26/18  1020   LDL 55          Failed - Patient has documented A1c within the specified period of time.    If HgbA1C is 8 or greater, it needs to be on file within the past 3 months.  If less than 8, must be on file within the past 6 months.     Recent Labs   Lab Test 02/26/18  1020   A1C 6.5*          Passed - Patient has had a Microalbumin in the past 15 mos.    Recent Labs   Lab Test 02/26/18  1028   MICROL <5   UMALCR Unable to calculate due to low value          Passed - Patient is age 10 or older       Passed - Patient's CR is NOT>1.4 OR Patient's EGFR is NOT<45 within past 12 mos.    Recent Labs   Lab Test 06/13/18  0809   GFRESTIMATED 86   GFRESTBLACK >90     Recent Labs   Lab Test 06/13/18  0754   CR 0.94          Passed - Patient does NOT have a diagnosis of CHF.       Passed - Medication is active on med list       Passed - Recent (6 mo) or future (30 days) visit within the authorizing provider's specialty    Patient had office visit in the last 6 months or has a visit in the next 30 days with authorizing  "provider or within the authorizing provider's specialty.  See \"Patient Info\" tab in inbasket, or \"Choose Columns\" in Meds & Orders section of the refill encounter.            "

## 2019-03-15 DIAGNOSIS — E11.65 TYPE 2 DIABETES MELLITUS WITH HYPERGLYCEMIA (H): ICD-10-CM

## 2019-03-15 RX ORDER — METFORMIN HCL 500 MG
500 TABLET, EXTENDED RELEASE 24 HR ORAL EVERY MORNING
Qty: 90 TABLET | Refills: 0 | Status: SHIPPED | OUTPATIENT
Start: 2019-03-15 | End: 2019-03-22

## 2019-03-15 NOTE — TELEPHONE ENCOUNTER
"Requested Prescriptions   Pending Prescriptions Disp Refills     lisinopril (PRINIVIL/ZESTRIL) 5 MG tablet [Pharmacy Med Name: LISINOPRIL 5 MG TABLET]  Last Written Prescription Date:  3/22/2018  Last Fill Quantity: 90 tabs,  # refills: 2   Last office visit: 2/26/2018 with prescribing provider:  Theo   Future Office Visit:   Next 5 appointments (look out 90 days)    Mar 22, 2019  9:40 AM CDT  PHYSICAL with Albino Venegas MD  [facility] [address] [place] [id number] [phone]          90 tablet 0     Sig: TAKE 1 TABLET (5 MG) BY MOUTH DAILY    ACE Inhibitors (Including Combos) Protocol Failed - 3/15/2019  1:15 AM       Failed - Blood pressure under 140/90 in past 12 months    BP Readings from Last 3 Encounters:   06/18/18 (!) 155/91   02/26/18 139/87   11/20/17 134/86                Passed - Recent (12 mo) or future (30 days) visit within the authorizing provider's specialty    Patient had office visit in the last 12 months or has a visit in the next 30 days with authorizing provider or within the authorizing provider's specialty.  See \"Patient Info\" tab in inbasket, or \"Choose Columns\" in Meds & Orders section of the refill encounter.             Passed - Medication is active on med list       Passed - Patient is age 18 or older       Passed - Normal serum creatinine on file in past 12 months    Recent Labs   Lab Test 06/13/18  0809 06/13/18  0754   CR  --  0.94   CREAT 0.9  --             Passed - Normal serum potassium on file in past 12 months    Recent Labs   Lab Test 06/13/18  0754   POTASSIUM 4.0               "

## 2019-03-18 RX ORDER — LISINOPRIL 5 MG/1
TABLET ORAL
Qty: 90 TABLET | Refills: 0 | Status: SHIPPED | OUTPATIENT
Start: 2019-03-18 | End: 2019-03-22

## 2019-03-21 NOTE — PROGRESS NOTES
SUBJECTIVE:   Cipriano Parry is a 63 year old male who presents to clinic today for the following health issues:    Diabetes Follow-up      Patient is checking blood sugars: rarely.  Results range from 110 to 140    Diabetic concerns: None and other - Frequently high     Symptoms of hypoglycemia (low blood sugar): none     Paresthesias (numbness or burning in feet) or sores: No     Date of last diabetic eye exam: NA    BP Readings from Last 2 Encounters:   06/18/18 (!) 155/91   02/26/18 139/87     Hemoglobin A1C (%)   Date Value   02/26/2018 6.5 (H)   09/06/2017 5.7     LDL Cholesterol Calculated (mg/dL)   Date Value   02/26/2018 55   01/23/2017 55     Diabetes Management Resources    Amount of exercise or physical activity: None    Problems taking medications regularly: No    Medication side effects: none    Diet: regular (no restrictions)    Through his wifes work - getting test strips etc.    For last 6 months taking care of his sister - ovarian cancer  Stage IV. Busy with her and could not focus on his blood sugars.     With hiking some toe nail injury. Right great toe nail.     Problem list and histories reviewed & adjusted, as indicated.  Additional history: as documented    Labs reviewed in EPIC    Reviewed and updated as needed this visit by clinical staff    Reviewed and updated as needed this visit by Provider    Social History     Occupational History     Not on file   Tobacco Use     Smoking status: Never Smoker     Smokeless tobacco: Never Used   Substance and Sexual Activity     Alcohol use: Yes     Alcohol/week: 0.0 oz     Comment: 0-1 drinks per week      Drug use: No     Sexual activity: Yes     Partners: Female     Birth control/protection: None     Allergies   Allergen Reactions     Penicillin G Rash     Patient Active Problem List   Diagnosis     Advanced directives, counseling/discussion     Elevated blood pressure reading without diagnosis of hypertension     Type 2 diabetes mellitus without  complication (H)     Malignant neoplasm of sigmoid colon (H)     Reviewed medications, social history and  past medical and surgical history.    Review of system: for general, respiratory, CVS, GI and psychiatry negative except for noted above.      EXAM:  /86 (BP Location: Right arm, Patient Position: Sitting, Cuff Size: Adult Regular)   Pulse 76   Temp 98.4  F (36.9  C) (Oral)   Resp 18   Wt 69.4 kg (153 lb)   SpO2 97%   BMI 23.44 kg/m    Constitutional: healthy, alert and no distress   Psychiatric: mentation appears normal and affect normal/bright  Cardiovascular: RRR. No murmurs,  Respiratory: negative, Lungs clear. No crackles or wheezing. No tachypnea.   Right great toenail -nail is  from nail bed but surrounding skin is normal without any erythema or ulcer.    ASSESSMENT / PLAN:  (E11.9) Type 2 diabetes mellitus without complication, unspecified whether long term insulin use (H)  (primary encounter diagnosis)  Comment: He is doing fairly well with his diabetes.  He is using metformin once a day and we agreed to stick to the same dose.  We may need to consider cutting down Metformin in future.  He should be using moderate to high potency statin but we agreed to stick to the same dose of Zocor for now.  Plan: Hemoglobin A1c, Comprehensive metabolic panel,         Albumin Random Urine Quantitative with Creat         Ratio, Lipid panel reflex to direct LDL         Fasting, simvastatin (ZOCOR) 10 MG tablet    (R03.0) Elevated blood pressure reading without diagnosis of hypertension  Comment:   Plan: His blood pressure is much better today.  Continue to monitor.    (V92.956Q) Injury of nail bed of toe  Comment:   Plan: Due to his diabetes I recommended him to keep an eye on his skin and nail.  He agreed.    Follow up: 6 months if doing well    The above note was dictated using voice recognition. Although reviewed after completion, some word and grammatical error may remain .

## 2019-03-22 ENCOUNTER — OFFICE VISIT (OUTPATIENT)
Dept: FAMILY MEDICINE | Facility: CLINIC | Age: 64
End: 2019-03-22
Payer: COMMERCIAL

## 2019-03-22 VITALS
SYSTOLIC BLOOD PRESSURE: 129 MMHG | DIASTOLIC BLOOD PRESSURE: 86 MMHG | RESPIRATION RATE: 18 BRPM | BODY MASS INDEX: 23.44 KG/M2 | WEIGHT: 153 LBS | OXYGEN SATURATION: 97 % | HEART RATE: 76 BPM | TEMPERATURE: 98.4 F

## 2019-03-22 DIAGNOSIS — E11.9 TYPE 2 DIABETES MELLITUS WITHOUT COMPLICATION, UNSPECIFIED WHETHER LONG TERM INSULIN USE (H): Primary | ICD-10-CM

## 2019-03-22 DIAGNOSIS — E11.9 TYPE 2 DIABETES MELLITUS WITHOUT COMPLICATION, WITHOUT LONG-TERM CURRENT USE OF INSULIN (H): ICD-10-CM

## 2019-03-22 DIAGNOSIS — R03.0 ELEVATED BLOOD PRESSURE READING WITHOUT DIAGNOSIS OF HYPERTENSION: ICD-10-CM

## 2019-03-22 DIAGNOSIS — S99.929A INJURY OF NAIL BED OF TOE: ICD-10-CM

## 2019-03-22 LAB
ALBUMIN SERPL-MCNC: 4.1 G/DL (ref 3.4–5)
ALP SERPL-CCNC: 54 U/L (ref 40–150)
ALT SERPL W P-5'-P-CCNC: 58 U/L (ref 0–70)
ANION GAP SERPL CALCULATED.3IONS-SCNC: 6 MMOL/L (ref 3–14)
AST SERPL W P-5'-P-CCNC: 43 U/L (ref 0–45)
BILIRUB SERPL-MCNC: 1 MG/DL (ref 0.2–1.3)
BUN SERPL-MCNC: 13 MG/DL (ref 7–30)
CALCIUM SERPL-MCNC: 9.4 MG/DL (ref 8.5–10.1)
CHLORIDE SERPL-SCNC: 107 MMOL/L (ref 94–109)
CHOLEST SERPL-MCNC: 119 MG/DL
CO2 SERPL-SCNC: 28 MMOL/L (ref 20–32)
CREAT SERPL-MCNC: 0.94 MG/DL (ref 0.66–1.25)
CREAT UR-MCNC: 99 MG/DL
GFR SERPL CREATININE-BSD FRML MDRD: 86 ML/MIN/{1.73_M2}
GLUCOSE SERPL-MCNC: 132 MG/DL (ref 70–99)
HBA1C MFR BLD: 6.5 % (ref 0–5.6)
HDLC SERPL-MCNC: 29 MG/DL
LDLC SERPL CALC-MCNC: 71 MG/DL
MICROALBUMIN UR-MCNC: 6 MG/L
MICROALBUMIN/CREAT UR: 6.05 MG/G CR (ref 0–17)
NONHDLC SERPL-MCNC: 90 MG/DL
POTASSIUM SERPL-SCNC: 4 MMOL/L (ref 3.4–5.3)
PROT SERPL-MCNC: 8.2 G/DL (ref 6.8–8.8)
SODIUM SERPL-SCNC: 141 MMOL/L (ref 133–144)
TRIGL SERPL-MCNC: 97 MG/DL

## 2019-03-22 PROCEDURE — 36415 COLL VENOUS BLD VENIPUNCTURE: CPT | Performed by: FAMILY MEDICINE

## 2019-03-22 PROCEDURE — 99214 OFFICE O/P EST MOD 30 MIN: CPT | Performed by: FAMILY MEDICINE

## 2019-03-22 PROCEDURE — 80053 COMPREHEN METABOLIC PANEL: CPT | Performed by: FAMILY MEDICINE

## 2019-03-22 PROCEDURE — 80061 LIPID PANEL: CPT | Performed by: FAMILY MEDICINE

## 2019-03-22 PROCEDURE — 82043 UR ALBUMIN QUANTITATIVE: CPT | Performed by: FAMILY MEDICINE

## 2019-03-22 PROCEDURE — 83036 HEMOGLOBIN GLYCOSYLATED A1C: CPT | Performed by: FAMILY MEDICINE

## 2019-03-22 RX ORDER — LISINOPRIL 5 MG/1
5 TABLET ORAL DAILY
Qty: 90 TABLET | Refills: 0 | Status: SHIPPED | OUTPATIENT
Start: 2019-06-04 | End: 2019-09-17

## 2019-03-22 RX ORDER — METFORMIN HCL 500 MG
500 TABLET, EXTENDED RELEASE 24 HR ORAL EVERY MORNING
Qty: 90 TABLET | Refills: 0 | Status: SHIPPED | OUTPATIENT
Start: 2019-06-04 | End: 2019-09-17

## 2019-03-22 RX ORDER — SIMVASTATIN 10 MG
10 TABLET ORAL AT BEDTIME
Qty: 90 TABLET | Refills: 3 | Status: SHIPPED | OUTPATIENT
Start: 2019-03-22 | End: 2020-03-12

## 2019-03-29 ASSESSMENT — ENCOUNTER SYMPTOMS
HEARTBURN: 0
WEAKNESS: 0
HEADACHES: 0
NAUSEA: 0
ARTHRALGIAS: 0
PALPITATIONS: 0
MYALGIAS: 0
HEMATURIA: 0
JOINT SWELLING: 0
NERVOUS/ANXIOUS: 0
CHILLS: 0
DIARRHEA: 0
COUGH: 0
PARESTHESIAS: 0
ABDOMINAL PAIN: 0
DIZZINESS: 0
HEMATOCHEZIA: 0
FREQUENCY: 0
DYSURIA: 0
CONSTIPATION: 0
EYE PAIN: 0
FEVER: 0

## 2019-04-01 ENCOUNTER — OFFICE VISIT (OUTPATIENT)
Dept: FAMILY MEDICINE | Facility: CLINIC | Age: 64
End: 2019-04-01
Payer: COMMERCIAL

## 2019-04-01 VITALS
DIASTOLIC BLOOD PRESSURE: 86 MMHG | WEIGHT: 156 LBS | SYSTOLIC BLOOD PRESSURE: 140 MMHG | HEART RATE: 67 BPM | RESPIRATION RATE: 16 BRPM | BODY MASS INDEX: 23.11 KG/M2 | HEIGHT: 69 IN | OXYGEN SATURATION: 99 %

## 2019-04-01 DIAGNOSIS — Z11.4 SCREENING FOR HIV (HUMAN IMMUNODEFICIENCY VIRUS): ICD-10-CM

## 2019-04-01 DIAGNOSIS — Z12.5 SCREENING FOR PROSTATE CANCER: ICD-10-CM

## 2019-04-01 DIAGNOSIS — Z00.00 ROUTINE HISTORY AND PHYSICAL EXAMINATION OF ADULT: Primary | ICD-10-CM

## 2019-04-01 LAB
HIV 1+2 AB+HIV1 P24 AG SERPL QL IA: NONREACTIVE
PSA SERPL-ACNC: 1.61 UG/L (ref 0–4)

## 2019-04-01 PROCEDURE — 87389 HIV-1 AG W/HIV-1&-2 AB AG IA: CPT | Performed by: FAMILY MEDICINE

## 2019-04-01 PROCEDURE — 36415 COLL VENOUS BLD VENIPUNCTURE: CPT | Performed by: FAMILY MEDICINE

## 2019-04-01 PROCEDURE — 99396 PREV VISIT EST AGE 40-64: CPT | Performed by: FAMILY MEDICINE

## 2019-04-01 PROCEDURE — G0103 PSA SCREENING: HCPCS | Performed by: FAMILY MEDICINE

## 2019-04-01 ASSESSMENT — ENCOUNTER SYMPTOMS
HEMATOCHEZIA: 0
CHILLS: 0
CONSTIPATION: 0
DYSURIA: 0
ABDOMINAL PAIN: 0
DIARRHEA: 0
DIZZINESS: 0
SORE THROAT: 0
NAUSEA: 0
PARESTHESIAS: 0
PALPITATIONS: 0
JOINT SWELLING: 0
HEMATURIA: 0
SHORTNESS OF BREATH: 0
HEARTBURN: 0
FREQUENCY: 0
WEAKNESS: 0
NERVOUS/ANXIOUS: 0
EYE PAIN: 0
FEVER: 0
COUGH: 0
MYALGIAS: 0
ARTHRALGIAS: 0
HEADACHES: 0

## 2019-04-01 ASSESSMENT — MIFFLIN-ST. JEOR: SCORE: 1485.05

## 2019-04-01 NOTE — PROGRESS NOTES
SUBJECTIVE:   CC: Cipriano Parry is an 63 year old male who presents for preventative health visit.     Physical   Annual:     Getting at least 3 servings of Calcium per day:  Yes    Bi-annual eye exam:  Yes    Dental care twice a year:  NO    Sleep apnea or symptoms of sleep apnea:  None    Diet:  Diabetic    Frequency of exercise:  2-3 days/week    Duration of exercise:  15-30 minutes    Taking medications regularly:  Yes    Medication side effects:  None    Additional concerns today:  No    PHQ-2 Total Score: 0       Today's PHQ-2 Score:   PHQ-2 ( 1999 [employer]) 3/29/2019   Q1: Little interest or pleasure in doing things 0   Q2: Feeling down, depressed or hopeless 0   PHQ-2 Score 0   Q1: Little interest or pleasure in doing things Not at all   Q2: Feeling down, depressed or hopeless Not at all   PHQ-2 Score 0     Abuse: Current or Past(Physical, Sexual or Emotional)- No  Do you feel safe in your environment? Yes    Social History     Tobacco Use     Smoking status: Never Smoker     Smokeless tobacco: Never Used   Substance Use Topics     Alcohol use: Yes     Alcohol/week: 0.0 oz     Comment: 0-1 drinks per week      Alcohol Use 3/29/2019   If you drink alcohol do you typically have greater than 3 drinks per day OR greater than 7 drinks per week? No     Last PSA:   PSA   Date Value Ref Range Status   04/01/2019 1.61 0 - 4 ug/L Final     Comment:     Assay Method:  Chemiluminescence using Siemens Vista analyzer     Reviewed orders with patient. Reviewed health maintenance and updated orders accordingly - Yes  Labs reviewed in EPIC    Reviewed and updated as needed this visit by clinical staff      Reviewed and updated as needed this visit by Provider    Review of Systems   Constitutional: Negative for chills and fever.   HENT: Negative for congestion, ear pain, hearing loss and sore throat.    Eyes: Negative for pain and visual disturbance.   Respiratory: Negative for cough and shortness of breath.    Cardiovascular:  "Negative for chest pain, palpitations and peripheral edema.   Gastrointestinal: Negative for abdominal pain, constipation, diarrhea, heartburn, hematochezia and nausea.   Genitourinary: Negative for discharge, dysuria, frequency, genital sores, hematuria, impotence and urgency.   Musculoskeletal: Negative for arthralgias, joint swelling and myalgias.   Skin: Negative for rash.   Neurological: Negative for dizziness, weakness, headaches and paresthesias.   Psychiatric/Behavioral: Negative for mood changes. The patient is not nervous/anxious.          OBJECTIVE:   /86   Pulse 67   Resp 16   Ht 1.74 m (5' 8.5\")   Wt 70.8 kg (156 lb)   SpO2 99%   BMI 23.37 kg/m      Physical Exam  GENERAL: healthy, alert and no distress  EYES: Eyes grossly normal to inspection, PERRL and conjunctivae and sclerae normal  HENT: , nose and mouth without ulcers or lesions,both ear wax.   NECK: no adenopathy, no asymmetry, masses, or scars and thyroid normal to palpation  RESP: lungs clear to auscultation - no rales, rhonchi or wheezes  CV: regular rate and rhythm, normal S1 S2, no S3 or S4, no murmur, click or rub, no peripheral edema and peripheral pulses strong  ABDOMEN: soft, nontender, no hepatosplenomegaly, no masses and bowel sounds normal   (male): declined  MS: no gross musculoskeletal defects noted, no edema  SKIN: no suspicious lesions or rashes  NEURO: Normal strength and tone, mentation intact and speech normal  PSYCH: mentation appears normal, affect normal/bright       ASSESSMENT/PLAN:   1. Routine history and physical examination of adult       2. Screening for prostate cancer     - Prostate spec antigen screen    3. Screening for HIV (human immunodeficiency virus)     - HIV Antigen Antibody Combo    COUNSELING:   Reviewed preventive health counseling, as reflected in patient instructions  Special attention given to:        Regular exercise       Healthy diet/nutrition       Vision screening       Hearing " "screening       Colon cancer screening       Prostate cancer screening    BP Readings from Last 1 Encounters:   04/01/19 140/86     Estimated body mass index is 23.37 kg/m  as calculated from the following:    Height as of this encounter: 1.74 m (5' 8.5\").    Weight as of this encounter: 70.8 kg (156 lb).    BP Screening:   Last 3 BP Readings:    BP Readings from Last 3 Encounters:   04/01/19 140/86   03/22/19 129/86   06/18/18 (!) 155/91       The following was recommended to the patient:  Re-screen within 4 weeks and recommend lifestyle modifications       reports that  has never smoked. he has never used smokeless tobacco.      Counseling Resources:  ATP IV Guidelines  Pooled Cohorts Equation Calculator  FRAX Risk Assessment  ICSI Preventive Guidelines  Dietary Guidelines for Americans, 2010  USDA's MyPlate  ASA Prophylaxis  Lung CA Screening    Albino Venegas MD, MD  [facility]  "

## 2019-04-01 NOTE — PATIENT INSTRUCTIONS
Preventive Health Recommendations  Male Ages 50 - 64    Yearly exam:             See your health care provider every year in order to  o   Review health changes.   o   Discuss preventive care.    o   Review your medicines if your doctor has prescribed any.     Have a cholesterol test every 5 years, or more frequently if you are at risk for high cholesterol/heart disease.     Have a diabetes test (fasting glucose) every three years. If you are at risk for diabetes, you should have this test more often.     Have a colonoscopy at age 50, or have a yearly FIT test (stool test). These exams will check for colon cancer.      Talk with your health care provider about whether or not a prostate cancer screening test (PSA) is right for you.    You should be tested each year for STDs (sexually transmitted diseases), if you re at risk.     Shots: Get a flu shot each year. Get a tetanus shot every 10 years.     Nutrition:    Eat at least 5 servings of fruits and vegetables daily.     Eat whole-grain bread, whole-wheat pasta and brown rice instead of white grains and rice.     Get adequate Calcium and Vitamin D.     Lifestyle    Exercise for at least 150 minutes a week (30 minutes a day, 5 days a week). This will help you control your weight and prevent disease.     Limit alcohol to one drink per day.     No smoking.     Wear sunscreen to prevent skin cancer.     See your dentist every six months for an exam and cleaning.     See your eye doctor every 1 to 2 years.

## 2019-06-14 ENCOUNTER — ANCILLARY PROCEDURE (OUTPATIENT)
Dept: CT IMAGING | Facility: CLINIC | Age: 64
End: 2019-06-14
Attending: INTERNAL MEDICINE
Payer: COMMERCIAL

## 2019-06-14 DIAGNOSIS — C18.7 MALIGNANT NEOPLASM OF SIGMOID COLON (H): ICD-10-CM

## 2019-06-14 LAB
ALBUMIN SERPL-MCNC: 4.3 G/DL (ref 3.4–5)
ALP SERPL-CCNC: 54 U/L (ref 40–150)
ALT SERPL W P-5'-P-CCNC: 47 U/L (ref 0–70)
ANION GAP SERPL CALCULATED.3IONS-SCNC: 6 MMOL/L (ref 3–14)
AST SERPL W P-5'-P-CCNC: 33 U/L (ref 0–45)
BASOPHILS # BLD AUTO: 0.1 10E9/L (ref 0–0.2)
BASOPHILS NFR BLD AUTO: 0.9 %
BILIRUB SERPL-MCNC: 0.7 MG/DL (ref 0.2–1.3)
BUN SERPL-MCNC: 16 MG/DL (ref 7–30)
CALCIUM SERPL-MCNC: 9.6 MG/DL (ref 8.5–10.1)
CEA SERPL-MCNC: 2.3 UG/L (ref 0–2.5)
CHLORIDE SERPL-SCNC: 107 MMOL/L (ref 94–109)
CO2 SERPL-SCNC: 25 MMOL/L (ref 20–32)
CREAT SERPL-MCNC: 0.87 MG/DL (ref 0.66–1.25)
DIFFERENTIAL METHOD BLD: ABNORMAL
EOSINOPHIL # BLD AUTO: 0.2 10E9/L (ref 0–0.7)
EOSINOPHIL NFR BLD AUTO: 1.7 %
ERYTHROCYTE [DISTWIDTH] IN BLOOD BY AUTOMATED COUNT: 12.3 % (ref 10–15)
GFR SERPL CREATININE-BSD FRML MDRD: >90 ML/MIN/{1.73_M2}
GLUCOSE SERPL-MCNC: 139 MG/DL (ref 70–99)
HCT VFR BLD AUTO: 44.7 % (ref 40–53)
HGB BLD-MCNC: 15.5 G/DL (ref 13.3–17.7)
LYMPHOCYTES # BLD AUTO: 5.5 10E9/L (ref 0.8–5.3)
LYMPHOCYTES NFR BLD AUTO: 50.9 %
MCH RBC QN AUTO: 31.4 PG (ref 26.5–33)
MCHC RBC AUTO-ENTMCNC: 34.7 G/DL (ref 31.5–36.5)
MCV RBC AUTO: 91 FL (ref 78–100)
MONOCYTES # BLD AUTO: 0.2 10E9/L (ref 0–1.3)
MONOCYTES NFR BLD AUTO: 1.8 %
NEUTROPHILS # BLD AUTO: 4.8 10E9/L (ref 1.6–8.3)
NEUTROPHILS NFR BLD AUTO: 44.7 %
PLATELET # BLD AUTO: 142 10E9/L (ref 150–450)
PLATELET # BLD EST: ABNORMAL 10*3/UL
POTASSIUM SERPL-SCNC: 4 MMOL/L (ref 3.4–5.3)
PROT SERPL-MCNC: 7.7 G/DL (ref 6.8–8.8)
RBC # BLD AUTO: 4.94 10E12/L (ref 4.4–5.9)
RBC MORPH BLD: NORMAL
SODIUM SERPL-SCNC: 138 MMOL/L (ref 133–144)
WBC # BLD AUTO: 10.8 10E9/L (ref 4–11)

## 2019-06-14 PROCEDURE — 82378 CARCINOEMBRYONIC ANTIGEN: CPT | Performed by: INTERNAL MEDICINE

## 2019-06-14 PROCEDURE — 80053 COMPREHEN METABOLIC PANEL: CPT | Performed by: INTERNAL MEDICINE

## 2019-06-14 PROCEDURE — 85025 COMPLETE CBC W/AUTO DIFF WBC: CPT | Performed by: INTERNAL MEDICINE

## 2019-06-14 RX ORDER — IOPAMIDOL 755 MG/ML
96 INJECTION, SOLUTION INTRAVASCULAR ONCE
Status: COMPLETED | OUTPATIENT
Start: 2019-06-14 | End: 2019-06-14

## 2019-06-14 RX ADMIN — IOPAMIDOL 96 ML: 755 INJECTION, SOLUTION INTRAVASCULAR at 09:29

## 2019-06-14 NOTE — DISCHARGE INSTRUCTIONS

## 2019-06-14 NOTE — NURSING NOTE
Chief Complaint   Patient presents with     Blood Draw     Labs drawn via /PIV placed by RN in lab. Ready for CT. Lab only appt.     Yuridia Floyd RN

## 2019-06-17 ENCOUNTER — ONCOLOGY VISIT (OUTPATIENT)
Dept: ONCOLOGY | Facility: CLINIC | Age: 64
End: 2019-06-17
Attending: INTERNAL MEDICINE
Payer: COMMERCIAL

## 2019-06-17 VITALS
WEIGHT: 158 LBS | HEIGHT: 69 IN | RESPIRATION RATE: 18 BRPM | SYSTOLIC BLOOD PRESSURE: 152 MMHG | DIASTOLIC BLOOD PRESSURE: 91 MMHG | HEART RATE: 78 BPM | TEMPERATURE: 97.8 F | OXYGEN SATURATION: 95 % | BODY MASS INDEX: 23.4 KG/M2

## 2019-06-17 DIAGNOSIS — C18.7 MALIGNANT NEOPLASM OF SIGMOID COLON (H): Primary | ICD-10-CM

## 2019-06-17 PROCEDURE — 99214 OFFICE O/P EST MOD 30 MIN: CPT | Mod: ZP | Performed by: INTERNAL MEDICINE

## 2019-06-17 PROCEDURE — G0463 HOSPITAL OUTPT CLINIC VISIT: HCPCS | Mod: ZF

## 2019-06-17 RX ORDER — ZOSTER VACCINE RECOMBINANT, ADJUVANTED 50 MCG/0.5
KIT INTRAMUSCULAR
COMMUNITY
Start: 2019-05-13 | End: 2019-11-18

## 2019-06-17 ASSESSMENT — PAIN SCALES - GENERAL: PAINLEVEL: NO PAIN (0)

## 2019-06-17 ASSESSMENT — MIFFLIN-ST. JEOR: SCORE: 1494.18

## 2019-06-17 NOTE — PROGRESS NOTES
[facility]    Hematology/Oncology Established Patient Note      Today's Date: 6/17/19    Reason for Follow-up: rectosigmoid carcinoma      HISTORY OF PRESENT ILLNESS: Cipriano Parry is a 63 year old male who presents with rectosigmoid cancer.  He underwent laparoscopic-assisted low anterior resection by Dr. Spivey on 11/16/16.  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  Normal mismatch repair protein expression.      He had a basal cell carcinoma removed from his right temple, and follows with dermatology.      INTERIM HISTORY: Cipriano comes in for follow-up today.  He says that he feels fine.  He has been doing a lot of camping.  He denies any new symptoms or complaints today.      REVIEW OF SYSTEMS:   14 point ROS was reviewed and is negative other than as noted above in HPI.       HOME MEDICATIONS:  Current Outpatient Medications   Medication Sig Dispense Refill     Coenzyme Q10 (CO Q 10 PO)        lisinopril (PRINIVIL/ZESTRIL) 5 MG tablet Take 1 tablet (5 mg) by mouth daily 90 tablet 0     metFORMIN (GLUCOPHAGE-XR) 500 MG 24 hr tablet Take 1 tablet (500 mg) by mouth every morning 90 tablet 0     Multiple Vitamins-Minerals (MULTIVITAMIN ADULT PO)        SHINGRIX injection        simvastatin (ZOCOR) 10 MG tablet Take 1 tablet (10 mg) by mouth At Bedtime 90 tablet 3     VITAMIN D, CHOLECALCIFEROL, PO Take by mouth daily           ALLERGIES:  Allergies   Allergen Reactions     Penicillin G Rash         PAST MEDICAL HISTORY:  Past Medical History:   Diagnosis Date     Diabetes (H)      HTN (hypertension)      Malignant neoplasm of sigmoid colon (H)          PAST SURGICAL HISTORY:  Past Surgical History:   Procedure Laterality Date     AS REMOVAL OF TONSILS,12+ Y/O       COLONOSCOPY       LAPAROSCOPIC COLECTOMY LOW  ANTERIOR N/A 11/16/2016    Procedure: LAPAROSCOPIC COLECTOMY LOW ANTERIOR;  Surgeon: Oneil Spivey MD;  Location: [facility] OR     SIGMOIDOSCOPY FLEXIBLE N/A 11/16/2016    Procedure: SIGMOIDOSCOPY FLEXIBLE;  Surgeon: Oneil Spivey MD;  Location:  OR         SOCIAL HISTORY:  Social History     Socioeconomic History     Marital status:      Spouse name: Not on file     Number of children: Not on file     Years of education: Not on file     Highest education level: Not on file   Occupational History     Not on file   Social Needs     Financial resource strain: Not on file     Food insecurity:     Worry: Not on file     Inability: Not on file     Transportation needs:     Medical: Not on file     Non-medical: Not on file   Tobacco Use     Smoking status: Never Smoker     Smokeless tobacco: Never Used   Substance and Sexual Activity     Alcohol use: Yes     Alcohol/week: 0.0 oz     Comment: 0-1 drinks per week      Drug use: No     Sexual activity: Yes     Partners: Female     Birth control/protection: None   Lifestyle     Physical activity:     Days per week: Not on file     Minutes per session: Not on file     Stress: Not on file   Relationships     Social connections:     Talks on phone: Not on file     Gets together: Not on file     Attends Yazdanism service: Not on file     Active member of club or organization: Not on file     Attends meetings of clubs or organizations: Not on file     Relationship status: Not on file     Intimate partner violence:     Fear of current or ex partner: Not on file     Emotionally abused: Not on file     Physically abused: Not on file     Forced sexual activity: Not on file   Other Topics Concern     Parent/sibling w/ CABG, MI or angioplasty before 65F 55M? No   Social History Narrative     Not on file     He has 4 sisters, one of whom has uterine cancer.  He has no family history of colorectal cancer.  He has no biological children.  He lives with his wife in [facility].   "He has no smoking history.  He rarely drinks alcohol.  No illicit drug use.  He works repairing computers.      FAMILY HISTORY:  Family History   Problem Relation Age of Onset     Other Cancer Sister          PHYSICAL EXAM:  Vital signs:  BP (!) 152/91 (BP Location: Left arm, Patient Position: Sitting, Cuff Size: Adult Regular)   Pulse 78   Temp 97.8  F (36.6  C) (Oral)   Resp 18   Ht 1.74 m (5' 8.5\")   Wt 71.7 kg (158 lb)   SpO2 95%   BMI 23.67 kg/m     ECO  GENERAL/CONSTITUTIONAL: No acute distress.  EYES: No scleral icterus.  RESPIRATORY: Clear to auscultation bilaterally. No crackles or wheezing.   CARDIOVASCULAR: Regular rate and rhythm without murmurs, gallops, or rubs.  GASTROINTESTINAL: No tenderness. The patient has normal bowel sounds. No guarding.  No distention.  MUSCULOSKELETAL: Warm and well-perfused, no edema.  NEUROLOGIC: Alert, oriented, answers questions appropriately.  INTEGUMENTARY: No jaundice.      LABS:  CBC RESULTS:   Recent Labs   Lab Test 19  0842   WBC 10.8   RBC 4.94   HGB 15.5   HCT 44.7   MCV 91   MCH 31.4   MCHC 34.7   RDW 12.3   *     Recent Labs   Lab Test 19  0842 19  0952    141   POTASSIUM 4.0 4.0   CHLORIDE 107 107   CO2 25 28   ANIONGAP 6 6   * 132*   BUN 16 13   CR 0.87 0.94   BRISSA 9.6 9.4     Lab Results   Component Value Date    AST 33 2019     Lab Results   Component Value Date    ALT 47 2019     No results found for: BILICONJ   Lab Results   Component Value Date    BILITOTAL 0.7 2019     Lab Results   Component Value Date    ALBUMIN 4.3 2019     Lab Results   Component Value Date    PROTTOTAL 7.7 2019      Lab Results   Component Value Date    ALKPHOS 54 2019     Component      Latest Ref Rng & Units 2017   CEA      0 - 2.5 ug/L 1.9 1.4 2.4 2.3       IMAGING:  CT c/a/p 19:  1. No convincing evidence for metastatic disease in the chest, abdomen  and " pelvis.  2. Small pulmonary nodules are unchanged since at least 11/9/2016. No  new or enlarging pulmonary nodules. Streaky. Subcentimeter  hypodensities in the right hepatic lobe are too small to characterize  and stable. No new or enlarging liver lesions.  3. Additional incidental findings as described above.          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  Normal mismatch repair protein expression.    Adjuvant chemotherapy is not recommended in a stage I tumor, and prognosis should be good.    -CT scan on 6/14/19 shows no evidence of recurrent or metastatic disease in the chest, abdomen, or pelvis  -he had his one-year colonoscopy with Dr. Spivey on 11/6/17.  There was no evidence of recurrence.  Next colonoscopy will be in 3 years from then (November 2020)  -we discussed surveillance.  Routine CT scans are no longer indicated in absence of symptoms.  He would like to follow-up every other year with labs/CEA, which is reasonable for now.    -RTC in 2 years with CBC, CMP, CEA. If still stable then, he may be discharged from this clinic to his PCP.    2) Hepatic hypodensities: seen at the falciform ligament, favored to represent focal fatty infiltration, and within hepatic segment 6, likely a cyst.  Follow-up was done.  CT scan on 6/14/19 showed stability since November 2016.  No new or enlarging liver lesions.  -will stop routine scans now    3) Pulmonary nodules: sub-4 mm pulmonary nodules are unchanged and stable since November 2016.  No new or enlarging pulmonary nodules.  This has been followed.  -will stop routine scans now    4) Thrombocytopenia: mild, stable.  -monitor CBC  for now      I spent a total of 25 minutes with the patient, with over >50% of the time in counseling and/or coordination of care.       Meenakshi Blue MD  Hematology/Oncology  [facility]

## 2019-06-17 NOTE — LETTER
6/17/2019       RE: Cipriano Parry  [address] [facility] [address]     Dear Colleague,    Thank you for referring your patient, Cipriano Parry, to the [facility]. Please see a copy of my visit note below.    [facility]    Hematology/Oncology Established Patient Note      Today's Date: 6/17/19    Reason for Follow-up: rectosigmoid carcinoma      HISTORY OF PRESENT ILLNESS: Cipriano Parry is a 63 year old male who presents with rectosigmoid cancer.  He underwent laparoscopic-assisted low anterior resection by Dr. Spivey on 11/16/16.  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  Normal mismatch repair protein expression.      He had a basal cell carcinoma removed from his right temple, and follows with dermatology.      INTERIM HISTORY: Cipriano comes in for follow-up today.  He says that he feels fine.  He has been doing a lot of camping.  He denies any new symptoms or complaints today.      REVIEW OF SYSTEMS:   14 point ROS was reviewed and is negative other than as noted above in HPI.       HOME MEDICATIONS:  Current Outpatient Medications   Medication Sig Dispense Refill     Coenzyme Q10 (CO Q 10 PO)        lisinopril (PRINIVIL/ZESTRIL) 5 MG tablet Take 1 tablet (5 mg) by mouth daily 90 tablet 0     metFORMIN (GLUCOPHAGE-XR) 500 MG 24 hr tablet Take 1 tablet (500 mg) by mouth every morning 90 tablet 0     Multiple Vitamins-Minerals (MULTIVITAMIN ADULT PO)        SHINGRIX injection        simvastatin (ZOCOR) 10 MG tablet Take 1 tablet (10 mg) by mouth At Bedtime 90 tablet 3     VITAMIN D, CHOLECALCIFEROL, PO Take by mouth daily           ALLERGIES:  Allergies   Allergen Reactions     Penicillin G Rash         PAST MEDICAL HISTORY:  Past Medical History:   Diagnosis Date     Diabetes (H)       HTN (hypertension)      Malignant neoplasm of sigmoid colon (H)          PAST SURGICAL HISTORY:  Past Surgical History:   Procedure Laterality Date     AS REMOVAL OF TONSILS,12+ Y/O       COLONOSCOPY       LAPAROSCOPIC COLECTOMY LOW ANTERIOR N/A 11/16/2016    Procedure: LAPAROSCOPIC COLECTOMY LOW ANTERIOR;  Surgeon: Oneil Spivey MD;  Location: [facility] OR     SIGMOIDOSCOPY FLEXIBLE N/A 11/16/2016    Procedure: SIGMOIDOSCOPY FLEXIBLE;  Surgeon: Oneil Spivey MD;  Location:  OR         SOCIAL HISTORY:  Social History     Socioeconomic History     Marital status:      Spouse name: Not on file     Number of children: Not on file     Years of education: Not on file     Highest education level: Not on file   Occupational History     Not on file   Social Needs     Financial resource strain: Not on file     Food insecurity:     Worry: Not on file     Inability: Not on file     Transportation needs:     Medical: Not on file     Non-medical: Not on file   Tobacco Use     Smoking status: Never Smoker     Smokeless tobacco: Never Used   Substance and Sexual Activity     Alcohol use: Yes     Alcohol/week: 0.0 oz     Comment: 0-1 drinks per week      Drug use: No     Sexual activity: Yes     Partners: Female     Birth control/protection: None   Lifestyle     Physical activity:     Days per week: Not on file     Minutes per session: Not on file     Stress: Not on file   Relationships     Social connections:     Talks on phone: Not on file     Gets together: Not on file     Attends Mormon service: Not on file     Active member of club or organization: Not on file     Attends meetings of clubs or organizations: Not on file     Relationship status: Not on file     Intimate partner violence:     Fear of current or ex partner: Not on file     Emotionally abused: Not on file     Physically abused: Not on file     Forced sexual activity: Not on file   Other Topics Concern     Parent/sibling w/ CABG, MI or angioplasty  "before 65F 55M? No   Social History Narrative     Not on file     He has 4 sisters, one of whom has uterine cancer.  He has no family history of colorectal cancer.  He has no biological children.  He lives with his wife in [facility].  He has no smoking history.  He rarely drinks alcohol.  No illicit drug use.  He works repairing computers.      FAMILY HISTORY:  Family History   Problem Relation Age of Onset     Other Cancer Sister          PHYSICAL EXAM:  Vital signs:  BP (!) 152/91 (BP Location: Left arm, Patient Position: Sitting, Cuff Size: Adult Regular)   Pulse 78   Temp 97.8  F (36.6  C) (Oral)   Resp 18   Ht 1.74 m (5' 8.5\")   Wt 71.7 kg (158 lb)   SpO2 95%   BMI 23.67 kg/m      ECO  GENERAL/CONSTITUTIONAL: No acute distress.  EYES: No scleral icterus.  RESPIRATORY: Clear to auscultation bilaterally. No crackles or wheezing.   CARDIOVASCULAR: Regular rate and rhythm without murmurs, gallops, or rubs.  GASTROINTESTINAL: No tenderness. The patient has normal bowel sounds. No guarding.  No distention.  MUSCULOSKELETAL: Warm and well-perfused, no edema.  NEUROLOGIC: Alert, oriented, answers questions appropriately.  INTEGUMENTARY: No jaundice.      LABS:  CBC RESULTS:   Recent Labs   Lab Test 19  0842   WBC 10.8   RBC 4.94   HGB 15.5   HCT 44.7   MCV 91   MCH 31.4   MCHC 34.7   RDW 12.3   *     Recent Labs   Lab Test 19  0842 19  0952    141   POTASSIUM 4.0 4.0   CHLORIDE 107 107   CO2 25 28   ANIONGAP 6 6   * 132*   BUN 16 13   CR 0.87 0.94   BRISSA 9.6 9.4     Lab Results   Component Value Date    AST 33 2019     Lab Results   Component Value Date    ALT 47 2019     No results found for: BILICONJ   Lab Results   Component Value Date    BILITOTAL 0.7 2019     Lab Results   Component Value Date    ALBUMIN 4.3 2019     Lab Results   Component Value Date    PROTTOTAL 7.7 2019      Lab Results   Component Value Date    ALKPHOS 54 " 06/14/2019     Component      Latest Ref Rng & Units 5/22/2017 11/13/2017 6/13/2018 6/14/2019   CEA      0 - 2.5 ug/L 1.9 1.4 2.4 2.3       IMAGING:  CT c/a/p 6/14/19:  1. No convincing evidence for metastatic disease in the chest, abdomen  and pelvis.  2. Small pulmonary nodules are unchanged since at least 11/9/2016. No  new or enlarging pulmonary nodules. Streaky. Subcentimeter  hypodensities in the right hepatic lobe are too small to characterize  and stable. No new or enlarging liver lesions.  3. Additional incidental findings as described above.          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  Normal mismatch repair protein expression.    Adjuvant chemotherapy is not recommended in a stage I tumor, and prognosis should be good.    -CT scan on 6/14/19 shows no evidence of recurrent or metastatic disease in the chest, abdomen, or pelvis  -he had his one-year colonoscopy with Dr. Spivey on 11/6/17.  There was no evidence of recurrence.  Next colonoscopy will be in 3 years from then (November 2020)  -we discussed surveillance.  Routine CT scans are no longer indicated in absence of symptoms.  He would like to follow-up every other year with labs/CEA, which is reasonable for now.    -RTC in 2 years with CBC, CMP, CEA. If still stable then, he may be discharged from this clinic to his PCP.    2) Hepatic hypodensities: seen at the falciform ligament, favored to represent focal fatty infiltration, and within hepatic segment 6, likely a cyst.  Follow-up was done.  CT scan on 6/14/19 showed stability since November 2016.  No new or enlarging liver lesions.  -will stop routine scans  now    3) Pulmonary nodules: sub-4 mm pulmonary nodules are unchanged and stable since November 2016.  No new or enlarging pulmonary nodules.  This has been followed.  -will stop routine scans now    4) Thrombocytopenia: mild, stable.  -monitor CBC for now      I spent a total of 25 minutes with the patient, with over >50% of the time in counseling and/or coordination of care.       Meenakshi Blue MD  Hematology/Oncology  [facility]

## 2019-06-17 NOTE — NURSING NOTE
"Oncology Rooming Note    June 17, 2019 8:37 AM   Cipriano Parry is a 63 year old male who presents for:    Chief Complaint   Patient presents with     Oncology Clinic Visit     Return visit related to Colon Cancer     Initial Vitals: BP (!) 152/91 (BP Location: Left arm, Patient Position: Sitting, Cuff Size: Adult Regular)   Pulse 78   Temp 97.8  F (36.6  C) (Oral)   Resp 18   Ht 1.74 m (5' 8.5\")   Wt 71.7 kg (158 lb)   SpO2 95%   BMI 23.67 kg/m   Estimated body mass index is 23.67 kg/m  as calculated from the following:    Height as of this encounter: 1.74 m (5' 8.5\").    Weight as of this encounter: 71.7 kg (158 lb). Body surface area is 1.86 meters squared.  No Pain (0) Comment: Data Unavailable   No LMP for male patient.  Allergies reviewed: Yes  Medications reviewed: Yes    Medications: Medication refills not needed today.  Pharmacy name entered into [employer]: [id number] - [place] [id number] [facility] AT [facility]    Clinical concerns: No new concerns. Provider was notified.      Katelin Nunez LPN            "

## 2019-09-17 DIAGNOSIS — E11.9 TYPE 2 DIABETES MELLITUS WITHOUT COMPLICATION, WITHOUT LONG-TERM CURRENT USE OF INSULIN (H): ICD-10-CM

## 2019-09-17 NOTE — TELEPHONE ENCOUNTER
"Requested Prescriptions   Pending Prescriptions Disp Refills     lisinopril (PRINIVIL/ZESTRIL) 5 MG tablet [Pharmacy Med Name: LISINOPRIL 5 MG TABLET] 90 tablet 0     Sig: TAKE 1 TABLET BY MOUTH EVERY DAY  Last Written Prescription Date:  6/4/2019  Last Fill Quantity: 90 tablet,  # refills: 0   Last Office Visit: 4/1/2019   Future Office Visit:            ACE Inhibitors (Including Combos) Protocol Failed - 9/17/2019  1:16 AM        Failed - Blood pressure under 140/90 in past 12 months     BP Readings from Last 3 Encounters:   06/17/19 (!) 152/91   04/01/19 140/86   03/22/19 129/86           Passed - Recent (12 mo) or future (30 days) visit within the authorizing provider's specialty     Patient had office visit in the last 12 months or has a visit in the next 30 days with authorizing provider or within the authorizing provider's specialty.  See \"Patient Info\" tab in inbasket, or \"Choose Columns\" in Meds & Orders section of the refill encounter.         Passed - Medication is active on med list        Passed - Patient is age 18 or older        Passed - Normal serum creatinine on file in past 12 months     Recent Labs   Lab Test 06/14/19  0842  06/13/18  0809   CR 0.87   < >  --    CREAT  --   --  0.9    < > = values in this interval not displayed.           Passed - Normal serum potassium on file in past 12 months     Recent Labs   Lab Test 06/14/19  0842   POTASSIUM 4.0        _________________________________________________________________       metFORMIN (GLUCOPHAGE-XR) 500 MG 24 hr tablet [Pharmacy Med Name: METFORMIN HCL  MG TABLET] 90 tablet 0     Sig: TAKE 1 TABLET (500 MG) BY MOUTH EVERY MORNING  Last Written Prescription Date:  6/4/2019  Last Fill Quantity: 90 tablet,  # refills: 0   Last Office Visit: 4/1/2019   Future Office Visit:            Biguanide Agents Failed - 9/17/2019  1:16 AM        Failed - Blood pressure less than 140/90 in past 6 months     BP Readings from Last 3 Encounters: " "  06/17/19 (!) 152/91   04/01/19 140/86   03/22/19 129/86           Passed - Patient has documented LDL within the past 12 mos.     Recent Labs   Lab Test 03/22/19  0952   LDL 71           Passed - Patient has had a Microalbumin in the past 15 mos.     Recent Labs   Lab Test 03/22/19  0952   MICROL 6   UMALCR 6.05           Passed - Patient is age 10 or older        Passed - Patient has documented A1c within the specified period of time.     If HgbA1C is 8 or greater, it needs to be on file within the past 3 months.  If less than 8, must be on file within the past 6 months.     Recent Labs   Lab Test 03/22/19  0952   A1C 6.5*           Passed - Patient's CR is NOT>1.4 OR Patient's EGFR is NOT<45 within past 12 mos.     Recent Labs   Lab Test 06/14/19  0842   GFRESTIMATED >90   GFRESTBLACK >90     Recent Labs   Lab Test 06/14/19  0842   CR 0.87           Passed - Patient does NOT have a diagnosis of CHF.        Passed - Medication is active on med list        Passed - Recent (6 mo) or future (30 days) visit within the authorizing provider's specialty     Patient had office visit in the last 6 months or has a visit in the next 30 days with authorizing provider or within the authorizing provider's specialty.  See \"Patient Info\" tab in inbasket, or \"Choose Columns\" in Meds & Orders section of the refill encounter.              "

## 2019-09-19 RX ORDER — LISINOPRIL 5 MG/1
TABLET ORAL
Qty: 30 TABLET | Refills: 0 | Status: SHIPPED | OUTPATIENT
Start: 2019-09-19 | End: 2019-10-20

## 2019-09-19 RX ORDER — METFORMIN HCL 500 MG
500 TABLET, EXTENDED RELEASE 24 HR ORAL EVERY MORNING
Qty: 30 TABLET | Refills: 0 | Status: SHIPPED | OUTPATIENT
Start: 2019-09-19 | End: 2019-10-20

## 2019-09-19 NOTE — TELEPHONE ENCOUNTER
"Routing refill request to provider for review/approval because:  --Last blood pressure in 4/1/19 office visit was not quite at goal.  --Then blood pressure in Oncology visit 6/17/19 was even higher (but Oncology blood pressure does not count in RN protocol.)    --I see the plan in the 4/1/19 office visit was - \"The following was recommended to the patient:    Re-screen within 4 weeks and recommend lifestyle modifications\"         ,  --Please contact patient and ask him to schedule an MA blood pressure visit as planned in last ov.    --A refill request for below medication was sent to the provider.                        "

## 2019-10-20 DIAGNOSIS — E11.9 TYPE 2 DIABETES MELLITUS WITHOUT COMPLICATION, WITHOUT LONG-TERM CURRENT USE OF INSULIN (H): ICD-10-CM

## 2019-10-20 NOTE — LETTER
October 21, 2019      Cipriano Parry  [address] [address] [address] [facility] [address]        Dear Cipriano,     In order to ensure we are providing the best quality care, we have reviewed your chart and see   that you are due for :      Diabetes follow up      Please call the clinic at your earliest convenience to schedule an appointment.    We greatly appreciate the opportunity to serve you and thank you for trusting us with your health care.      Your health care team at [facility]             Sincerely,        Danuta Bowie MD

## 2019-10-21 RX ORDER — METFORMIN HCL 500 MG
500 TABLET, EXTENDED RELEASE 24 HR ORAL EVERY MORNING
Qty: 15 TABLET | Refills: 0 | Status: SHIPPED | OUTPATIENT
Start: 2019-10-21 | End: 2019-11-10

## 2019-10-21 RX ORDER — LISINOPRIL 5 MG/1
TABLET ORAL
Qty: 15 TABLET | Refills: 0 | Status: SHIPPED | OUTPATIENT
Start: 2019-10-21 | End: 2019-11-10

## 2019-10-21 NOTE — TELEPHONE ENCOUNTER
"Requested Prescriptions   Pending Prescriptions Disp Refills     metFORMIN (GLUCOPHAGE-XR) 500 MG 24 hr tablet [Pharmacy Med Name: METFORMIN HCL  MG TABLET] 30 tablet 0     Sig: TAKE 1 TABLET (500 MG) BY MOUTH EVERY MORNING         Last Written Prescription Date:  9/19/19  Last Fill Quantity: 30,   # refills: 0        Routing refill request to provider for review/approval because:  Sofiya given x1 and patient did not follow up, please advise  No office visit scheduled            Biguanide Agents Failed - 10/20/2019  8:37 AM        Failed - Blood pressure less than 140/90 in past 6 months     BP Readings from Last 3 Encounters:   06/17/19 (!) 152/91   04/01/19 140/86   03/22/19 129/86                 Failed - Patient has documented A1c within the specified period of time.     If HgbA1C is 8 or greater, it needs to be on file within the past 3 months.  If less than 8, must be on file within the past 6 months.     Recent Labs   Lab Test 03/22/19  0952   A1C 6.5*             Failed - Recent (6 mo) or future (30 days) visit within the authorizing provider's specialty     Patient had office visit in the last 6 months or has a visit in the next 30 days with authorizing provider or within the authorizing provider's specialty.  See \"Patient Info\" tab in inbasket, or \"Choose Columns\" in Meds & Orders section of the refill encounter.            Passed - Patient has documented LDL within the past 12 mos.     Recent Labs   Lab Test 03/22/19  0952   LDL 71             Passed - Patient has had a Microalbumin in the past 15 mos.     Recent Labs   Lab Test 03/22/19  0952   MICROL 6   UMALCR 6.05             Passed - Patient is age 10 or older        Passed - Patient's CR is NOT>1.4 OR Patient's EGFR is NOT<45 within past 12 mos.     Recent Labs   Lab Test 06/14/19  0842   GFRESTIMATED >90   GFRESTBLACK >90       Recent Labs   Lab Test 06/14/19  0842   CR 0.87             Passed - Patient does NOT have a diagnosis of CHF.        " "Passed - Medication is active on med list        lisinopril (PRINIVIL/ZESTRIL) 5 MG tablet [Pharmacy Med Name: LISINOPRIL 5 MG TABLET] 30 tablet 0     Sig: TAKE 1 TABLET BY MOUTH EVERY DAY         Last Written Prescription Date:  9/19/19  Last Fill Quantity: 30,   # refills: 0  Last Office Visit: 4/1/19  Future Office visit:       Routing refill request to provider for review/approval because:  Sofiya given x1 and patient did not follow up, please advise        ACE Inhibitors (Including Combos) Protocol Failed - 10/20/2019  8:37 AM        Failed - Blood pressure under 140/90 in past 12 months     BP Readings from Last 3 Encounters:   06/17/19 (!) 152/91   04/01/19 140/86   03/22/19 129/86                 Passed - Recent (12 mo) or future (30 days) visit within the authorizing provider's specialty     Patient has had an office visit with the authorizing provider or a provider within the authorizing providers department within the previous 12 mos or has a future within next 30 days. See \"Patient Info\" tab in inbasket, or \"Choose Columns\" in Meds & Orders section of the refill encounter.              Passed - Medication is active on med list        Passed - Patient is age 18 or older        Passed - Normal serum creatinine on file in past 12 months     Recent Labs   Lab Test 06/14/19  0842  06/13/18  0809   CR 0.87   < >  --    CREAT  --   --  0.9    < > = values in this interval not displayed.             Passed - Normal serum potassium on file in past 12 months     Recent Labs   Lab Test 06/14/19  0842   POTASSIUM 4.0                   HW Reception - office visit for future refills  "

## 2019-11-10 DIAGNOSIS — E11.9 TYPE 2 DIABETES MELLITUS WITHOUT COMPLICATION, WITHOUT LONG-TERM CURRENT USE OF INSULIN (H): ICD-10-CM

## 2019-11-11 NOTE — TELEPHONE ENCOUNTER
"Requested Prescriptions   Pending Prescriptions Disp Refills     lisinopril (PRINIVIL/ZESTRIL) 5 MG tablet [Pharmacy Med Name: LISINOPRIL 5 MG TABLET] 15 tablet 0     Sig: TAKE 1 TABLET BY MOUTH EVERY DAY  Last Written Prescription Date:  10/21/2019  Last Fill Quantity: 15 tablet,  # refills: 0   Last Office Visit: 4/1/2019   Future Office Visit:    Next 5 appointments (look out 90 days)    Nov 18, 2019 10:40 AM CST  Office Visit with Albino Venegas MD  [facility] ([facility]) [address] [place] [id number] [phone]              ACE Inhibitors (Including Combos) Protocol Failed - 11/10/2019  5:11 PM        Failed - Blood pressure under 140/90 in past 12 months     BP Readings from Last 3 Encounters:   06/17/19 (!) 152/91   04/01/19 140/86   03/22/19 129/86           Passed - Recent (12 mo) or future (30 days) visit within the authorizing provider's specialty     Patient has had an office visit with the authorizing provider or a provider within the authorizing providers department within the previous 12 mos or has a future within next 30 days. See \"Patient Info\" tab in inbasket, or \"Choose Columns\" in Meds & Orders section of the refill encounter.            Passed - Medication is active on med list        Passed - Patient is age 18 or older        Passed - Normal serum creatinine on file in past 12 months     Recent Labs   Lab Test 06/14/19  0842  06/13/18  0809   CR 0.87   < >  --    CREAT  --   --  0.9    < > = values in this interval not displayed.           Passed - Normal serum potassium on file in past 12 months     Recent Labs   Lab Test 06/14/19  0842   POTASSIUM 4.0        _________________________________________________________________       metFORMIN (GLUCOPHAGE-XR) 500 MG 24 hr tablet [Pharmacy Med Name: METFORMIN HCL  MG TABLET] 15 tablet 0     Sig: TAKE 1 TABLET (500 MG) BY MOUTH EVERY MORNING. DUE FOR REFILL  Last Written " "Prescription Date:  10/21/2019  Last Fill Quantity: 15 tablet,  # refills: 0   Last Office Visit: 4/1/2019   Future Office Visit:    Next 5 appointments (look out 90 days)    Nov 18, 2019 10:40 AM CST  Office Visit with Albino Venegas MD  [facility] ([facility]) [address] [place] [id number] [phone]              Biguanide Agents Failed - 11/10/2019  5:11 PM        Failed - Blood pressure less than 140/90 in past 6 months     BP Readings from Last 3 Encounters:   06/17/19 (!) 152/91   04/01/19 140/86   03/22/19 129/86           Failed - Patient has documented A1c within the specified period of time.     If HgbA1C is 8 or greater, it needs to be on file within the past 3 months.  If less than 8, must be on file within the past 6 months.     Recent Labs   Lab Test 03/22/19  0952   A1C 6.5*           Passed - Patient has documented LDL within the past 12 mos.     Recent Labs   Lab Test 03/22/19  0952   LDL 71           Passed - Patient has had a Microalbumin in the past 15 mos.     Recent Labs   Lab Test 03/22/19  0952   MICROL 6   UMALCR 6.05           Passed - Patient is age 10 or older        Passed - Patient's CR is NOT>1.4 OR Patient's EGFR is NOT<45 within past 12 mos.     Recent Labs   Lab Test 06/14/19  0842   GFRESTIMATED >90   GFRESTBLACK >90       Recent Labs   Lab Test 06/14/19  0842   CR 0.87           Passed - Patient does NOT have a diagnosis of CHF.        Passed - Medication is active on med list        Passed - Recent (6 mo) or future (30 days) visit within the authorizing provider's specialty     Patient had office visit in the last 6 months or has a visit in the next 30 days with authorizing provider or within the authorizing provider's specialty.  See \"Patient Info\" tab in inbasket, or \"Choose Columns\" in Meds & Orders section of the refill encounter.              "

## 2019-11-13 DIAGNOSIS — E11.9 TYPE 2 DIABETES MELLITUS WITHOUT COMPLICATION, WITHOUT LONG-TERM CURRENT USE OF INSULIN (H): ICD-10-CM

## 2019-11-13 RX ORDER — LISINOPRIL 5 MG/1
TABLET ORAL
Qty: 15 TABLET | Refills: 0 | Status: SHIPPED | OUTPATIENT
Start: 2019-11-13 | End: 2019-11-13

## 2019-11-13 RX ORDER — METFORMIN HCL 500 MG
TABLET, EXTENDED RELEASE 24 HR ORAL
Qty: 90 TABLET | Refills: 0 | Status: SHIPPED | OUTPATIENT
Start: 2019-11-13 | End: 2019-11-18

## 2019-11-13 RX ORDER — METFORMIN HCL 500 MG
TABLET, EXTENDED RELEASE 24 HR ORAL
Qty: 15 TABLET | Refills: 0 | Status: SHIPPED | OUTPATIENT
Start: 2019-11-13 | End: 2019-11-13

## 2019-11-13 RX ORDER — LISINOPRIL 5 MG/1
5 TABLET ORAL DAILY
Qty: 90 TABLET | Refills: 0 | Status: SHIPPED | OUTPATIENT
Start: 2019-11-13 | End: 2019-11-18

## 2019-11-13 NOTE — TELEPHONE ENCOUNTER
Dr. Venegas-Notification received from pharmacy patient's insurance will only cover 90 day supplies of Lisinopril and Metformin.    Patient overdue for follow up and was most recent prescribed judah refills for 15 day supplies.    Okay to send in 90 day supplies? Patient scheduled for office visit 11/18/19.    Thank you!  MICHAEL Resendiz, DIANDRAN, RN

## 2019-11-13 NOTE — TELEPHONE ENCOUNTER
Dr. Venegas,  Routing refill request to provider for review/approval because:  Sofiya given x1 and patient did not follow up, please advise  Patient's BP not at goal    Patient has office visit scheduled 11/18/2019    Thank You!  Argenis Villafana, RN  Triage Nurse

## 2019-11-18 ENCOUNTER — OFFICE VISIT (OUTPATIENT)
Dept: FAMILY MEDICINE | Facility: CLINIC | Age: 64
End: 2019-11-18
Payer: COMMERCIAL

## 2019-11-18 VITALS
WEIGHT: 159.5 LBS | SYSTOLIC BLOOD PRESSURE: 148 MMHG | TEMPERATURE: 98.5 F | RESPIRATION RATE: 16 BRPM | BODY MASS INDEX: 23.62 KG/M2 | OXYGEN SATURATION: 98 % | HEIGHT: 69 IN | DIASTOLIC BLOOD PRESSURE: 96 MMHG | HEART RATE: 76 BPM

## 2019-11-18 DIAGNOSIS — E11.9 TYPE 2 DIABETES MELLITUS WITHOUT COMPLICATION, WITHOUT LONG-TERM CURRENT USE OF INSULIN (H): Primary | ICD-10-CM

## 2019-11-18 DIAGNOSIS — I10 ESSENTIAL HYPERTENSION: ICD-10-CM

## 2019-11-18 DIAGNOSIS — L60.8 NAIL DEFORMITY: ICD-10-CM

## 2019-11-18 LAB — HBA1C MFR BLD: 6.9 % (ref 0–5.6)

## 2019-11-18 PROCEDURE — 36415 COLL VENOUS BLD VENIPUNCTURE: CPT | Performed by: FAMILY MEDICINE

## 2019-11-18 PROCEDURE — 83036 HEMOGLOBIN GLYCOSYLATED A1C: CPT | Performed by: FAMILY MEDICINE

## 2019-11-18 PROCEDURE — 99214 OFFICE O/P EST MOD 30 MIN: CPT | Performed by: FAMILY MEDICINE

## 2019-11-18 PROCEDURE — 99207 C FOOT EXAM  NO CHARGE: CPT | Performed by: FAMILY MEDICINE

## 2019-11-18 RX ORDER — LISINOPRIL 5 MG/1
10 TABLET ORAL DAILY
Refills: 0 | COMMUNITY
Start: 2019-11-18 | End: 2019-12-16

## 2019-11-18 RX ORDER — METFORMIN HCL 500 MG
TABLET, EXTENDED RELEASE 24 HR ORAL
Qty: 90 TABLET | Refills: 0 | Status: SHIPPED | OUTPATIENT
Start: 2020-02-11 | End: 2020-05-06

## 2019-11-18 ASSESSMENT — MIFFLIN-ST. JEOR: SCORE: 1495.93

## 2019-11-18 NOTE — PROGRESS NOTES
Subjective     Cipriano Parry is a 64 year old male who presents to clinic today for the following health issues:    HPI   Diabetes Follow-up    How often are you checking your blood sugar? Once a week  What time of day are you checking your blood sugars (select all that apply)?  Before meals  Have you had any blood sugars above 200?  No  Have you had any blood sugars below 70?  No    What symptoms do you notice when your blood sugar is low?  None    What concerns do you have today about your diabetes? None     Do you have any of these symptoms? (Select all that apply)  No numbness or tingling in feet.  No redness, sores or blisters on feet.  No complaints of excessive thirst.  No reports of blurry vision.  No significant changes to weight.     Have you had a diabetic eye exam in the last 12 months? No but has eye appt coming up    BP Readings from Last 2 Encounters:   06/17/19 (!) 152/91   04/01/19 140/86     Hemoglobin A1C (%)   Date Value   03/22/2019 6.5 (H)   02/26/2018 6.5 (H)     LDL Cholesterol Calculated (mg/dL)   Date Value   03/22/2019 71   02/26/2018 55       Diabetes Management Resources      How many servings of fruits and vegetables do you eat daily?  1-2    On average, how many sweetened beverages do you drink each day (soda, juice, sweet tea, etc)?   0    How many days per week do you miss taking your medication? 0    Was taking care of his sisters kids in [place]. She has cancer.  Now another sister is taking care of her.        Reviewed and updated as needed this visit by Provider         Social History     Occupational History     Not on file   Tobacco Use     Smoking status: Never Smoker     Smokeless tobacco: Never Used   Substance and Sexual Activity     Alcohol use: Not Currently     Alcohol/week: 0.0 standard drinks     Comment: 0-1 drinks per week      Drug use: No     Sexual activity: Yes     Partners: Female     Birth control/protection: None     Allergies   Allergen Reactions      "Penicillin G Rash     Patient Active Problem List   Diagnosis     Advanced directives, counseling/discussion     Elevated blood pressure reading without diagnosis of hypertension     Type 2 diabetes mellitus without complication (H)     Malignant neoplasm of sigmoid colon (H)     Reviewed medications, social history and  past medical and surgical history.    Review of system: for general, respiratory, CVS, GI and psychiatry negative except for noted above.     EXAM:  BP (!) 148/96 (BP Location: Right arm, Patient Position: Sitting, Cuff Size: Adult Regular)   Pulse 76   Temp 98.5  F (36.9  C) (Oral)   Resp 16   Ht 1.74 m (5' 8.5\")   Wt 72.3 kg (159 lb 8 oz)   SpO2 98%   BMI 23.90 kg/m    Constitutional: healthy, alert and no distress   Psychiatric: mentation appears normal and affect normal/bright  Cardiovascular: RRR. No murmurs,  Respiratory: negative, Lungs clear. No crackles or wheezing. No tachypnea.   Both feet monofilament test negative.  Right great toe detached from the nail bed and either growth or a new nail is already developing underneath    ASSESSMENT / PLAN:  (E11.9) Type 2 diabetes mellitus without complication, without long-term current use of insulin (H)  (primary encounter diagnosis)  Comment: Somewhat change in his lifestyle as he was taking care of his sister. Now back to his routine.  We will avoid any dose change.  He does overall very well with his diet and lifestyle.  Plan: Hemoglobin A1c, C FOOT EXAM  NO CHARGE,         metFORMIN (GLUCOPHAGE-XR) 500 MG 24 hr tablet             (L60.8) Nail deformity  Comment: I suspect nail injury and nailbed is detached but it has been over a year and it is not completely detached and underlying new tissue development appears slightly deformed.  I cannot tell for sure if it is just the abnormal nail development versus other etiology.  We will schedule him to see the podiatrist.  Plan: PODIATRY/FOOT & ANKLE SURGERY REFERRAL             (I10) " Essential hypertension  Comment: Blood pressure is persistently above goal.  We will go up on the dose of lisinopril.  He has multiple 5 mg tablet and he will start taking 2 a day.  He is going to come back in a month for nurse only appointment for blood pressure recheck.  We will keep going up on the dose of lisinopril if needed.  Plan: lisinopril (PRINIVIL/ZESTRIL) 5 MG tablet                 Return in about 6 months (around 5/18/2020).  If blood pressure is under control we can see him back in 6 months      The above note was dictated using voice recognition. Although reviewed after completion, some word and grammatical error may remain .

## 2019-12-16 ENCOUNTER — ALLIED HEALTH/NURSE VISIT (OUTPATIENT)
Dept: NURSING | Facility: CLINIC | Age: 64
End: 2019-12-16
Payer: COMMERCIAL

## 2019-12-16 VITALS — SYSTOLIC BLOOD PRESSURE: 128 MMHG | DIASTOLIC BLOOD PRESSURE: 80 MMHG | HEART RATE: 86 BPM

## 2019-12-16 DIAGNOSIS — Z01.30 BP CHECK: Primary | ICD-10-CM

## 2019-12-16 DIAGNOSIS — E11.9 TYPE 2 DIABETES MELLITUS WITHOUT COMPLICATION, UNSPECIFIED WHETHER LONG TERM INSULIN USE (H): ICD-10-CM

## 2019-12-16 DIAGNOSIS — I10 ESSENTIAL HYPERTENSION: ICD-10-CM

## 2019-12-16 PROCEDURE — 99207 ZZC NO CHARGE NURSE ONLY: CPT

## 2019-12-16 RX ORDER — LISINOPRIL 5 MG/1
10 TABLET ORAL DAILY
Qty: 180 TABLET | Refills: 1 | Status: SHIPPED | OUTPATIENT
Start: 2019-12-16 | End: 2020-02-07

## 2019-12-16 RX ORDER — SIMVASTATIN 10 MG
10 TABLET ORAL AT BEDTIME
Qty: 90 TABLET | Refills: 3 | Status: CANCELLED | OUTPATIENT
Start: 2019-12-16

## 2019-12-16 NOTE — Clinical Note
Pt state new bp dosage is working well, will need a refill soon and pt will like a refill for the simvastatin.

## 2019-12-16 NOTE — PROGRESS NOTES
Panel Management Review      Patient has the following on his problem list: None      Composite cancer screening  Chart review shows that this patient is due/due soon for the following None  Summary:    Patient is due/failing the following:    Eye exam and immunization    Action needed:    need pt to send a copy of eye exam once completed in 1/2020.     Type of outreach:    in clinic outreach    Questions for provider review:    None                                                                                                                                    Padmaja Bustos MA       Chart routed to Care Team .

## 2019-12-16 NOTE — TELEPHONE ENCOUNTER
"Pt does not need refill orders for simvastatin yet.  His pharm has orders to last till March 2020.  For lisinopril prescription approved per [facility] Refill Protocol.  Livier Earl RN      Last Office Visit: 11/18/2019 plan - \"Essential hypertension  Comment: Blood pressure is persistently above goal.  We will go up on the dose of lisinopril.  He has multiple 5 mg tablet and he will start taking 2 a day.  He is going to come back in a month for nurse only appointment for blood pressure recheck.  We will keep going up on the dose of lisinopril if needed.\"  \"If blood pressure is under control he can see him back in 6 months\"    Future Office Visit:  NONE      Requested Prescriptions   Pending Prescriptions Disp Refills     simvastatin (ZOCOR) 10 MG tablet 90 tablet 3     Sig: Take 1 tablet (10 mg) by mouth At Bedtime       Statins Protocol Passed - 12/16/2019 10:36 AM        Passed - LDL on file in past 12 months     Recent Labs   Lab Test 03/22/19  0952   LDL 71             Passed - No abnormal creatine kinase in past 12 months     No lab results found.             Passed - Recent (12 mo) or future (30 days) visit within the authorizing provider's specialty     Patient has had an office visit with the authorizing provider or a provider within the authorizing providers department within the previous 12 mos or has a future within next 30 days. See \"Patient Info\" tab in inbasket, or \"Choose Columns\" in Meds & Orders section of the refill encounter.              Passed - Medication is active on med list        Passed - Patient is age 18 or older        lisinopril (PRINIVIL/ZESTRIL) 5 MG tablet 90 tablet      Sig: Take 2 tablets (10 mg) by mouth daily       ACE Inhibitors (Including Combos) Protocol Passed - 12/16/2019 10:36 AM        Passed - Blood pressure under 140/90 in past 12 months     BP Readings from Last 3 Encounters:   12/16/19 128/80   11/18/19 (!) 148/96   06/17/19 (!) 152/91                 Passed - Recent (12 mo) " "or future (30 days) visit within the authorizing provider's specialty     Patient has had an office visit with the authorizing provider or a provider within the authorizing providers department within the previous 12 mos or has a future within next 30 days. See \"Patient Info\" tab in inbasket, or \"Choose Columns\" in Meds & Orders section of the refill encounter.              Passed - Medication is active on med list        Passed - Patient is age 18 or older        Passed - Normal serum creatinine on file in past 12 months     Recent Labs   Lab Test 06/14/19  0842  06/13/18  0809   CR 0.87   < >  --    CREAT  --   --  0.9    < > = values in this interval not displayed.             Passed - Normal serum potassium on file in past 12 months     Recent Labs   Lab Test 06/14/19  0842   POTASSIUM 4.0                 "

## 2020-01-06 ENCOUNTER — OFFICE VISIT (OUTPATIENT)
Dept: PODIATRY | Facility: CLINIC | Age: 65
End: 2020-01-06
Payer: COMMERCIAL

## 2020-01-06 VITALS
HEIGHT: 69 IN | DIASTOLIC BLOOD PRESSURE: 89 MMHG | SYSTOLIC BLOOD PRESSURE: 140 MMHG | BODY MASS INDEX: 23.96 KG/M2 | HEART RATE: 91 BPM | WEIGHT: 161.8 LBS

## 2020-01-06 DIAGNOSIS — E11.9 TYPE 2 DIABETES MELLITUS WITHOUT COMPLICATION, UNSPECIFIED WHETHER LONG TERM INSULIN USE (H): ICD-10-CM

## 2020-01-06 DIAGNOSIS — L60.3 ONYCHODYSTROPHY: Primary | ICD-10-CM

## 2020-01-06 PROCEDURE — 11720 DEBRIDE NAIL 1-5: CPT | Performed by: PODIATRIST

## 2020-01-06 PROCEDURE — 99243 OFF/OP CNSLTJ NEW/EST LOW 30: CPT | Mod: 25 | Performed by: PODIATRIST

## 2020-01-06 ASSESSMENT — MIFFLIN-ST. JEOR: SCORE: 1514.3

## 2020-01-06 NOTE — PATIENT INSTRUCTIONS
Thank you for choosing [facility] / [facility]!    Follow up as needed    DR. COLLIER'S CLINIC LOCATIONS     MONDAY  [place] TUESDAY & FRIDAY AM  [place]   [address]   [address]   [place] [address] [place] [address]   [phone]  [phone]  [phone]       WEDNESDAY  [facility] MAR SCHEDULE SURGERY: [phone]   [address] [facility] APPOINTMENTS: [phone]   [place] [address] BILLING QUESTIONS: [phone]   [phone]       NAIL FUNGUS / ONYCHOMYCOSIS   Nail fungus is not a hygiene problem and will not likely lead to significant medical   problems. The nails may get thick causing pain and possibly local skin infection.   Treatments include debridement (trimming), oral antifungals, topical antifungals and complete removal of the nail. Most fungal nails are not treated.   Topicals such as tea tree oil can be helpful for surface fungus and may, at best, limit   progression. Over the counter creams (such as Lamisil) can also be used however, their effectiveness is also quite low.  Topical treatment with Pen lac is expensive and often not covered by insurance. Pen lac has an approximate 8% success rate. Topical therapy recommendations is to apply twice a day for at least 3-4 months as it takes 9 months for new nail to grow out.    Experts suggest soaking your feet for 15 to 20 minutes in a mixture of 1 cup vinegar to 4 cups warm water. Be sure to rinse well and pat your feet dry when you're done. You can soak your feet like this daily. But if your skin becomes irritated, try soaking only two to three times a week. Vicks VapoRub, as with vinegar, there have been no controlled clinical trials to assess the effectiveness of Vicks VapoRub on nail fungus, but there have been numerous anecdotal reports that it works. There's no consensus on how often to apply this product, so check with your doctor before using it on  "your nails.     Oral therapies include Sporanox and Lamisil. Oral therapies are also expensive and not very effective. Side effects such as liver disease are the main concern. Return of fungus is common even if the treatment worked.     Other Tips:  - Penlac nail medication apply daily x 4 months; remove old polish first day of each week  - Antifungal cream/powder (Zeasorb) - apply daily to feet and shoes x 2 months  - Clean shoes with Lysol or in washing machine every few weeks  - Rotate shoe gear; give them 24 hours to dry out between days wearing them  - Clean pair of socks in morning, clean pair in afternoon if your feet sweat  - Shower shoes used in public showers/pools    DIABETES AND YOUR FEET  Diabetes can result in several problems in the feet including ulcers (open sores) and amputations. Two of the most important reasons why people develop foot problems when they have diabetes is : 1. Neuropathy (loss of feeling)  2. Vascular disease (loss or decrease of blood flow).    Neuropathy is a term used to describe a loss of nerve function.  Patients with diabetes are at risk of developing neuropathy if their sugars continue to run high and are above the normal value. One theory for neuropathy is that the \"extra\" sugar in the body enters the nerves and is broken down. These by-products build up in the nerve causing it to swell and impairing nerve function. Often times, this can be prevented by controlling your sugars, dieting and exercise.    When a person develops neuropathy, they usually begin to feel numbness or tingling in their feet and sometime in their legs.  Other symptoms may include painful burning or hot feet, tingling or feeling like insects or ants are crawling on your feet or legs.  If the diabetes is sever and the sugars run high for long periods of time, neuropathy can also occur in the hands.    Vascular disease  is a term used to describe a loss or decrease in circulation (blood flow). There is " a problem in getting blood and oxygen to areas that need it. Similar to neuropathy, sugars can build up in the walls of the arteries (blood vessels) and cause them to become swollen, thickened and hardened. This decreases the amount of blood that can go to an area that needs it. Though this is common in the legs of diabetic patients, it can also affect other arteries (blood vessels) in the body such as in the heart and eyes.    In the legs, vascular disease usually results in cramping. Patients who develop leg cramps after walking the same distance every time (i.e. One block, half a mile, ect.) need to let their doctors know so that their circulation may be checked. Cramps causing severe pain in the feet and/or legs while sleeping and the cramps go away when you stand or hang your legs off the side of the bed, may also be a sign of poor blood circulation.  Occasional cramping in cold weather or on rare occasions with activity may not be due to poor circulation, but you should inform your doctor.    PREVENTION OF THESE DISEASES  The key to prevention is good blood sugar control. Poor blood sugar control is a big reason many of these problems start. Physical activity (exercise) is a very good way to help decrease your blood sugars. Exercise can lower your blood sugar, blood pressure, and cholesterol. It also reduces your risk for heart disease and stroke, relieves stress, and strengthens your heart, muscles and bones.  In addition, regular activity helps insulin work better, improves your blood circulation, and keeps your joints flexible. If you're trying to lose weight, a combination of exercise and wise food choices can help you reach your target weight and maintain it.      PAIN MANAGEMENT  1.Blood Sugar Control - Most important  2. Medications such as:  Amytriptylline, duloxetine, gabapentin, lyrica, tramadol  3. Nutritional therapy:  Vitamin B6 (100mg daily), Vitamin B12 (75mcg daily), Vitamin D 2000 IU daily),  Alpha-Lipoic Acid (600-1800mg daily), Acetyl-L-Carnitine (500-1000mg TID, L-methyl folate (1500mcg daily)    ** Metformin can block Vitamin B6 and B12 so it is important to supplement**    FOOT CARE RECOMMENDATIONS   1. Wash your feet with lukewarm water and a mild soap and then dry them thoroughly, especially between the toes.     2. Examine your feet daily looking for cuts, corns, blisters, cracks, ect, especially after wearing new shoes. Make sure to look between your toes. If you cannot see the bottom of your feet, set a mirror on the floor and hold your foot over it, or ask a spouse, friend or family member to examine your feet for you. Contact your doctor immediately if new problems are noted or if sores are not healing.     3. Immediately apply moisturizer to the tops and bottoms of your feet, avoiding areas between the toes. Hand lotion (Intesive Care, Ewelina, Eucerin, Neutrogena, Curel, ect) is sufficient unless your doctor prescribes a medicated lotion. Apply sunscreen to your feet when going swimming outside.     4. Use clean comfortable shoes, wear white socks (if you have any bleeding or drainage, you will see it on white socks). Socks should not have thick seams or cut off the circulation around the leg. Break in new shoes slowly and rotate with older shoes until broken in. Check the inside of your shoes with your hand to look for areas of irritation or objects that may have fallen into your shoes.       5. Keep slippers by the side of your bed for use during the night.     6.  Shoes should be fitted by a professional and should not cause areas of irritation.  Check your feet regularly when wearing a new pair of shoes and replace them as needed.     7.  Talk to your doctor about proper exercise. Exercise and stretching stimulate blood flow to your feet and maintain proper glucose levels.     8.  Monitor your blood glucose level as instructed by your doctor. Notify your doctor immediately if your blood  sugar is abnormally high or low.    9. Cut your nails straight across, but then gently round any sharp edges with a cardboard nail file. If you have neuropathy, peripheral vascular disease or cannot see that well to trim your own toenails contact [facility] ([phone]) or [facility] ([phone]).      THINGS TO AVOID DOING   1.  Do not soak your feet if you have an open sore. Use only lukewarm water and always check the temperature with your hand as hot water can easily burn your feet.       2.  Never use a hot water bottle or heating pad on your feet. Also do not apply cold compresses to your feet. With decreased sensation, you could burn or freeze your feet.       3.  Do not apply any of these to your feet:    -  Over the counter medicine for corns or warts    -  Harsh chemicals like boric acid    -  Do not self-treat corns, cuts, blisters or infections. Always consult your doctor.       4.  Do not wear sandals, slippers or walk barefoot, especially on hot sand or concrete or other harsh surfaces.     5.  If you smoke, stop!!!      Cipriano to follow up with Primary Care provider regarding elevated blood pressure.      BODY WEIGHT AND YOUR FEET  The following information is included in the after visit summary for all patients. Body weight can be a sensitive issue to discuss in clinic, but we think the following information is very important. Although we focus on the feet and ankles, we do support the overall health of our patients.     Many things can cause foot and ankle problems. Foot structure, activity level, foot mechanics and injuries are common causes of pain. One very important issue that often goes unmentioned, is body weight. Extra weight can cause increased stress on muscles, ligaments, bones and tendons. Sometimes just a few extra pounds is all it takes to put one over her/his threshold. Without reducing that stress, it can be difficult to alleviate pain. As Foot & Ankle specialists, our job is  addressing the lower extremity problem and possible causes. Regarding extra body weight, we encourage patients to discuss diet and weight management plans with their primary care doctors. It is this team approach that gives you the best opportunity for pain relief and getting you back on your feet.      [facility] has a Comprehensive Weight Management Program. This program includes counseling, education, non-surgical and surgical approaches to weight loss. If you are interested in learning more either talk to you primary care provider or call [phone].

## 2020-01-06 NOTE — LETTER
1/6/2020         RE: Cipriano Parry  [address] [address] [address] [employer] [address]        Dear Colleague,    Thank you for referring your patient, Cipriano Parry, to the [facility]. Please see a copy of my visit note below.    PATIENT HISTORY:  Cipriano Parry is a 64 year old male who presents to clinic for a deformed, thick R 1st toenail.  No pain.  Caused from hiking about a year ago.  He noted the nail was bruised at that time.  Nail has formed a ridge.  No other treatments.  Hx of DM.  Denies neuropathy.    I was requested to see this patient for this issue by Dr Venegas.      Review of Systems:  Patient denies fever, chills, rash, wound, stiffness, limping, numbness, weakness, heart burn, blood in stool, chest pain with activity, calf pain when walking, shortness of breath with activity, chronic cough, easy bleeding/bruising, swelling of ankles, excessive thirst, fatigue, depression, anxiety.       PAST MEDICAL HISTORY:   Past Medical History:   Diagnosis Date     Diabetes (H)      HTN (hypertension)      Malignant neoplasm of sigmoid colon (H)         PAST SURGICAL HISTORY:   Past Surgical History:   Procedure Laterality Date     AS REMOVAL OF TONSILS,12+ Y/O       COLONOSCOPY       LAPAROSCOPIC COLECTOMY LOW ANTERIOR N/A 11/16/2016    Procedure: LAPAROSCOPIC COLECTOMY LOW ANTERIOR;  Surgeon: Oneil Spivey MD;  Location: [facility] OR     SIGMOIDOSCOPY FLEXIBLE N/A 11/16/2016    Procedure: SIGMOIDOSCOPY FLEXIBLE;  Surgeon: Oneil Spivey MD;  Location: [facility] OR        MEDICATIONS:   Current Outpatient Medications:      lisinopril (PRINIVIL/ZESTRIL) 5 MG tablet, Take 2 tablets (10 mg) by mouth daily, Disp: 180 tablet, Rfl: 1     [START ON 2/11/2020] metFORMIN (GLUCOPHAGE-XR) 500 MG 24 hr tablet, TAKE 1 TABLET (500 MG) BY MOUTH EVERY MORNING. DUE FOR OFFICE VISIT, Disp: 90 tablet, Rfl: 0     Multiple Vitamins-Minerals (MULTIVITAMIN ADULT PO), , Disp: , Rfl:      simvastatin (ZOCOR) 10 MG tablet,  "Take 1 tablet (10 mg) by mouth At Bedtime, Disp: 90 tablet, Rfl: 3     VITAMIN D, CHOLECALCIFEROL, PO, Take by mouth daily, Disp: , Rfl:      ALLERGIES:    Allergies   Allergen Reactions     Penicillin G Rash        SOCIAL HISTORY:   Social History     Socioeconomic History     Marital status:      Spouse name: Not on file     Number of children: Not on file     Years of education: Not on file     Highest education level: Not on file   Occupational History     Not on file   Social Needs     Financial resource strain: Not on file     Food insecurity:     Worry: Not on file     Inability: Not on file     Transportation needs:     Medical: Not on file     Non-medical: Not on file   Tobacco Use     Smoking status: Never Smoker     Smokeless tobacco: Never Used   Substance and Sexual Activity     Alcohol use: Not Currently     Alcohol/week: 0.0 standard drinks     Comment: 0-1 drinks per week      Drug use: No     Sexual activity: Yes     Partners: Female     Birth control/protection: None   Lifestyle     Physical activity:     Days per week: Not on file     Minutes per session: Not on file     Stress: Not on file   Relationships     Social connections:     Talks on phone: Not on file     Gets together: Not on file     Attends Nondenominational service: Not on file     Active member of club or organization: Not on file     Attends meetings of clubs or organizations: Not on file     Relationship status: Not on file     Intimate partner violence:     Fear of current or ex partner: Not on file     Emotionally abused: Not on file     Physically abused: Not on file     Forced sexual activity: Not on file   Other Topics Concern     Parent/sibling w/ CABG, MI or angioplasty before 65F 55M? No   Social History Narrative     Not on file        FAMILY HISTORY:   Family History   Problem Relation Age of Onset     Other Cancer Sister         EXAM:Vitals: BP (!) 140/89   Pulse 91   Ht 1.753 m (5' 9\")   Wt 73.4 kg (161 lb 12.8 oz)  "  BMI 23.89 kg/m     BMI= Body mass index is 23.89 kg/m .    General appearance: Patient is alert and fully cooperative with history & exam.  No sign of distress is noted during the visit.     Psychiatric: Affect is pleasant & appropriate.  Patient appears motivated to improve health.     Respiratory: Breathing is regular & unlabored while sitting.     HEENT: Hearing is intact to spoken word.  Speech is clear.  No gross evidence of visual impairment that would impact ambulation.     Dermatologic: R hallux nail is thickened, elongated, discolored, dystrophic, with partial delamination of old nail noted; new nail is growing behind this loose portion. No paronychia or evidence of soft tissue infection is noted.     Vascular:  PT pulses are intact & regular bilaterally.  I could not locate DP pulses b/l.  No significant edema or varicosities noted.  CFT and skin temperature are normal to both lower extremities.     Neurologic: Lower extremity sensation is intact to light touch.  No evidence of weakness or contracture in the lower extremities.  No evidence of neuropathy.     Musculoskeletal: Patient is ambulatory without assistive device or brace.  No gross ankle deformity noted.  No foot or ankle joint effusion is noted.     ASSESSMENT:   R hallux onychodystrophy  DM II     PLAN:  Reviewed patient's chart in epic.  Discussed condition and treatment options including pros and cons.    Nail changes likely permanent from trauma.  Fungus is possible.    Discussed causes of nail fungus.  Discussed treatment options with patient and explained that there isn't one treatment that is 100% effective.  Debridement is an option.  Discussed oral lamisil which is the most effective at about 70% but can have liver effects.  Discussed over the counter antifungal creams.  Explained that these are less effective and need to be applied twice a day for about 3-4 months.  Also talked about prescription topicals, which have similar efficacy.   Also discussed that if there was damage to the nail and the nail is now dystrophic that none of the above is going to change the nail.  Discussed that if it becomes painful, we can remove the nail in clinic.      Pt requested debridement.  I debrided the loose portion of the R 1st toenail with a nail nipper, removing the delaminated portion.  Rest of nail appears adhered.      Discussed diabetic foot care and prevention.   Discussed risk of ulceration, infection, amputation related to neuropathy.  No barefoot walking.  Check feet daily.  I encouraged proper diabetes management including diet, blood sugar control.    F/u prn.    Mariano Mcalin DPM, FACELIJAH Cota to follow up with Primary Care provider regarding elevated blood pressure.          Again, thank you for allowing me to participate in the care of your patient.        Sincerely,        Mariano Mclain DPM

## 2020-01-06 NOTE — PROGRESS NOTES
PATIENT HISTORY:  Cipriano Parry is a 64 year old male who presents to clinic for a deformed, thick R 1st toenail.  No pain.  Caused from hiking about a year ago.  He noted the nail was bruised at that time.  Nail has formed a ridge.  No other treatments.  Hx of DM.  Denies neuropathy.    I was requested to see this patient for this issue by Dr Venegas.      Review of Systems:  Patient denies fever, chills, rash, wound, stiffness, limping, numbness, weakness, heart burn, blood in stool, chest pain with activity, calf pain when walking, shortness of breath with activity, chronic cough, easy bleeding/bruising, swelling of ankles, excessive thirst, fatigue, depression, anxiety.       PAST MEDICAL HISTORY:   Past Medical History:   Diagnosis Date     Diabetes (H)      HTN (hypertension)      Malignant neoplasm of sigmoid colon (H)         PAST SURGICAL HISTORY:   Past Surgical History:   Procedure Laterality Date     AS REMOVAL OF TONSILS,12+ Y/O       COLONOSCOPY       LAPAROSCOPIC COLECTOMY LOW ANTERIOR N/A 11/16/2016    Procedure: LAPAROSCOPIC COLECTOMY LOW ANTERIOR;  Surgeon: Oneil Spivey MD;  Location: [facility] OR     SIGMOIDOSCOPY FLEXIBLE N/A 11/16/2016    Procedure: SIGMOIDOSCOPY FLEXIBLE;  Surgeon: Oneil Spivey MD;  Location: [facility] OR        MEDICATIONS:   Current Outpatient Medications:      lisinopril (PRINIVIL/ZESTRIL) 5 MG tablet, Take 2 tablets (10 mg) by mouth daily, Disp: 180 tablet, Rfl: 1     [START ON 2/11/2020] metFORMIN (GLUCOPHAGE-XR) 500 MG 24 hr tablet, TAKE 1 TABLET (500 MG) BY MOUTH EVERY MORNING. DUE FOR OFFICE VISIT, Disp: 90 tablet, Rfl: 0     Multiple Vitamins-Minerals (MULTIVITAMIN ADULT PO), , Disp: , Rfl:      simvastatin (ZOCOR) 10 MG tablet, Take 1 tablet (10 mg) by mouth At Bedtime, Disp: 90 tablet, Rfl: 3     VITAMIN D, CHOLECALCIFEROL, PO, Take by mouth daily, Disp: , Rfl:      ALLERGIES:    Allergies   Allergen Reactions     Penicillin G Rash        SOCIAL HISTORY:   Social History  "    Socioeconomic History     Marital status:      Spouse name: Not on file     Number of children: Not on file     Years of education: Not on file     Highest education level: Not on file   Occupational History     Not on file   Social Needs     Financial resource strain: Not on file     Food insecurity:     Worry: Not on file     Inability: Not on file     Transportation needs:     Medical: Not on file     Non-medical: Not on file   Tobacco Use     Smoking status: Never Smoker     Smokeless tobacco: Never Used   Substance and Sexual Activity     Alcohol use: Not Currently     Alcohol/week: 0.0 standard drinks     Comment: 0-1 drinks per week      Drug use: No     Sexual activity: Yes     Partners: Female     Birth control/protection: None   Lifestyle     Physical activity:     Days per week: Not on file     Minutes per session: Not on file     Stress: Not on file   Relationships     Social connections:     Talks on phone: Not on file     Gets together: Not on file     Attends Rastafarian service: Not on file     Active member of club or organization: Not on file     Attends meetings of clubs or organizations: Not on file     Relationship status: Not on file     Intimate partner violence:     Fear of current or ex partner: Not on file     Emotionally abused: Not on file     Physically abused: Not on file     Forced sexual activity: Not on file   Other Topics Concern     Parent/sibling w/ CABG, MI or angioplasty before 65F 55M? No   Social History Narrative     Not on file        FAMILY HISTORY:   Family History   Problem Relation Age of Onset     Other Cancer Sister         EXAM:Vitals: BP (!) 140/89   Pulse 91   Ht 1.753 m (5' 9\")   Wt 73.4 kg (161 lb 12.8 oz)   BMI 23.89 kg/m    BMI= Body mass index is 23.89 kg/m .    General appearance: Patient is alert and fully cooperative with history & exam.  No sign of distress is noted during the visit.     Psychiatric: Affect is pleasant & appropriate.  Patient " appears motivated to improve health.     Respiratory: Breathing is regular & unlabored while sitting.     HEENT: Hearing is intact to spoken word.  Speech is clear.  No gross evidence of visual impairment that would impact ambulation.     Dermatologic: R hallux nail is thickened, elongated, discolored, dystrophic, with partial delamination of old nail noted; new nail is growing behind this loose portion. No paronychia or evidence of soft tissue infection is noted.     Vascular:  PT pulses are intact & regular bilaterally.  I could not locate DP pulses b/l.  No significant edema or varicosities noted.  CFT and skin temperature are normal to both lower extremities.     Neurologic: Lower extremity sensation is intact to light touch.  No evidence of weakness or contracture in the lower extremities.  No evidence of neuropathy.     Musculoskeletal: Patient is ambulatory without assistive device or brace.  No gross ankle deformity noted.  No foot or ankle joint effusion is noted.     ASSESSMENT:   R hallux onychodystrophy  DM II     PLAN:  Reviewed patient's chart in epic.  Discussed condition and treatment options including pros and cons.    Nail changes likely permanent from trauma.  Fungus is possible.    Discussed causes of nail fungus.  Discussed treatment options with patient and explained that there isn't one treatment that is 100% effective.  Debridement is an option.  Discussed oral lamisil which is the most effective at about 70% but can have liver effects.  Discussed over the counter antifungal creams.  Explained that these are less effective and need to be applied twice a day for about 3-4 months.  Also talked about prescription topicals, which have similar efficacy.  Also discussed that if there was damage to the nail and the nail is now dystrophic that none of the above is going to change the nail.  Discussed that if it becomes painful, we can remove the nail in clinic.      Pt requested debridement.  I  debrided the loose portion of the R 1st toenail with a nail nipper, removing the delaminated portion.  Rest of nail appears adhered.      Discussed diabetic foot care and prevention.   Discussed risk of ulceration, infection, amputation related to neuropathy.  No barefoot walking.  Check feet daily.  I encouraged proper diabetes management including diet, blood sugar control.    F/u prn.    Mariano Mclain DPM, ADELA Cota to follow up with Primary Care provider regarding elevated blood pressure.

## 2020-01-13 ENCOUNTER — TRANSFERRED RECORDS (OUTPATIENT)
Dept: HEALTH INFORMATION MANAGEMENT | Facility: CLINIC | Age: 65
End: 2020-01-13

## 2020-02-05 DIAGNOSIS — I10 ESSENTIAL HYPERTENSION: ICD-10-CM

## 2020-02-05 NOTE — TELEPHONE ENCOUNTER
"Requested Prescriptions   Pending Prescriptions Disp Refills     lisinopril (PRINIVIL/ZESTRIL) 5 MG tablet [Pharmacy Med Name: LISINOPRIL 5 MG TABLET] 90 tablet 0     Sig: TAKE 1 TABLET BY MOUTH EVERY DAY  Last Written Prescription Date:  12/16/2019  Last Fill Quantity: 180 tablet,  # refills: 1   Last Office Visit: 11/18/2019   Future Office Visit:            ACE Inhibitors (Including Combos) Protocol Failed - 2/5/2020  2:44 AM        Failed - Blood pressure under 140/90 in past 12 months     BP Readings from Last 3 Encounters:   01/06/20 (!) 140/89   12/16/19 128/80   11/18/19 (!) 148/96           Passed - Recent (12 mo) or future (30 days) visit within the authorizing provider's specialty     Patient has had an office visit with the authorizing provider or a provider within the authorizing providers department within the previous 12 mos or has a future within next 30 days. See \"Patient Info\" tab in inbasket, or \"Choose Columns\" in Meds & Orders section of the refill encounter.            Passed - Medication is active on med list        Passed - Patient is age 18 or older        Passed - Normal serum creatinine on file in past 12 months     Recent Labs   Lab Test 06/14/19  0842  06/13/18  0809   CR 0.87   < >  --    CREAT  --   --  0.9    < > = values in this interval not displayed.           Passed - Normal serum potassium on file in past 12 months     Recent Labs   Lab Test 06/14/19  0842   POTASSIUM 4.0               "

## 2020-02-07 RX ORDER — LISINOPRIL 5 MG/1
TABLET ORAL
Qty: 90 TABLET | Refills: 0 | Status: SHIPPED | OUTPATIENT
Start: 2020-02-07 | End: 2020-05-20

## 2020-02-10 ENCOUNTER — TRANSFERRED RECORDS (OUTPATIENT)
Dept: HEALTH INFORMATION MANAGEMENT | Facility: CLINIC | Age: 65
End: 2020-02-10

## 2020-03-12 DIAGNOSIS — E11.9 TYPE 2 DIABETES MELLITUS WITHOUT COMPLICATION, UNSPECIFIED WHETHER LONG TERM INSULIN USE (H): ICD-10-CM

## 2020-03-12 RX ORDER — SIMVASTATIN 10 MG
TABLET ORAL
Qty: 90 TABLET | Refills: 0 | Status: SHIPPED | OUTPATIENT
Start: 2020-03-12 | End: 2020-06-14

## 2020-03-12 NOTE — TELEPHONE ENCOUNTER
HW REception Medication is being filled for 1 time refill only due to:  Patient needs labs fasting cholesterol after 3/22 please.     Signed Prescriptions:                        Disp   Refills    simvastatin (ZOCOR) 10 MG tablet           90 tab*0        Sig: TAKE 1 TABLET BY MOUTH EVERYDAY AT BEDTIME  Authorizing Provider: VANIA LOPEZ  Ordering User: SERA CANTU

## 2020-03-12 NOTE — TELEPHONE ENCOUNTER
"Requested Prescriptions   Pending Prescriptions Disp Refills     simvastatin (ZOCOR) 10 MG tablet [Pharmacy Med Name: SIMVASTATIN 10 MG TABLET] 90 tablet 3     Sig: TAKE 1 TABLET BY MOUTH EVERYDAY AT BEDTIME  Last Written Prescription Date:  3/22/2019  Last Fill Quantity: 90 tablet,  # refills: 3   Last Office Visit: 11/18/2019   Future Office Visit:    Next 5 appointments (look out 90 days)    Apr 06, 2020  9:20 AM CDT  PHYSICAL with Albino Venegas MD  [facility] ([facility]) [address] [place] [id number] [phone]              Statins Protocol Passed - 3/12/2020  4:26 AM        Passed - LDL on file in past 12 months     Recent Labs   Lab Test 03/22/19  0952   LDL 71           Passed - No abnormal creatine kinase in past 12 months     No lab results found.           Passed - Recent (12 mo) or future (30 days) visit within the authorizing provider's specialty     Patient has had an office visit with the authorizing provider or a provider within the authorizing providers department within the previous 12 mos or has a future within next 30 days. See \"Patient Info\" tab in inbasket, or \"Choose Columns\" in Meds & Orders section of the refill encounter.            Passed - Medication is active on med list        Passed - Patient is age 18 or older             "

## 2020-05-03 DIAGNOSIS — E11.9 TYPE 2 DIABETES MELLITUS WITHOUT COMPLICATION, WITHOUT LONG-TERM CURRENT USE OF INSULIN (H): ICD-10-CM

## 2020-05-06 RX ORDER — METFORMIN HCL 500 MG
TABLET, EXTENDED RELEASE 24 HR ORAL
Qty: 90 TABLET | Refills: 0 | Status: SHIPPED | OUTPATIENT
Start: 2020-05-06 | End: 2020-07-06

## 2020-05-06 NOTE — TELEPHONE ENCOUNTER
Routing refill request to provider for review/approval because:  Blood Pressure above range  Yesy Resendiz RN

## 2020-05-18 DIAGNOSIS — I10 ESSENTIAL HYPERTENSION: ICD-10-CM

## 2020-05-20 RX ORDER — LISINOPRIL 5 MG/1
TABLET ORAL
Qty: 30 TABLET | Refills: 0 | Status: SHIPPED | OUTPATIENT
Start: 2020-05-20 | End: 2020-06-24

## 2020-05-20 NOTE — TELEPHONE ENCOUNTER
Does not pass refill protocol.  BP Readings from Last 3 Encounters:   01/06/20 (!) 140/89   12/16/19 128/80   11/18/19 (!) 148/96         Name from pharmacy: LISINOPRIL 5 MG TABLET           Will file in chart as: lisinopril (ZESTRIL) 5 MG tablet          Sig: TAKE 1 TABLET BY MOUTH EVERY DAY     Disp:  90 tablet    Refills:  0     Start: 5/18/2020     Class: E-Prescribe     Non-formulary  For: Essential hypertension     Last ordered: 3 months ago by Albino Veengas MD  Last refill: 2/23/2020     Rx #: [id number]     ACE Inhibitors (Including Combos) Protocol [id number]/18/2020 12:03 AM   Blood pressure under 140/90 in past 12 months Protocol Details    Recent (12 mo) or future (30 days) visit within the authorizing provider's specialty     Medication is active on med list     Patient is age 18 or older     Normal serum creatinine on file in past 12 months     Normal serum potassium on file in past 12 months

## 2020-06-08 DIAGNOSIS — E11.9 TYPE 2 DIABETES MELLITUS WITHOUT COMPLICATION, UNSPECIFIED WHETHER LONG TERM INSULIN USE (H): ICD-10-CM

## 2020-06-08 NOTE — TELEPHONE ENCOUNTER
Routing refill request to provider for review/approval because:  Labs not current:    Recent Labs   Lab Test 03/22/19  0952 02/26/18  1020   CHOL 119 104   HDL 29* 32*   LDL 71 55   TRIG 97 83

## 2020-06-14 RX ORDER — SIMVASTATIN 10 MG
TABLET ORAL
Qty: 30 TABLET | Refills: 0 | Status: SHIPPED | OUTPATIENT
Start: 2020-06-14 | End: 2020-07-06

## 2020-06-14 NOTE — TELEPHONE ENCOUNTER
Refilled for 30 days.   Please encourage and help patient to schedule virtual appointment for video or telephone visit..

## 2020-06-22 DIAGNOSIS — I10 ESSENTIAL HYPERTENSION: ICD-10-CM

## 2020-06-24 RX ORDER — LISINOPRIL 5 MG/1
5 TABLET ORAL DAILY
Qty: 14 TABLET | Refills: 0 | Status: SHIPPED | OUTPATIENT
Start: 2020-06-24 | End: 2020-07-06

## 2020-06-24 RX ORDER — LISINOPRIL 5 MG/1
TABLET ORAL
Qty: 180 TABLET | Refills: 1 | OUTPATIENT
Start: 2020-06-24

## 2020-06-24 NOTE — TELEPHONE ENCOUNTER
Routing refill request to provider for review/approval because:  --Last order sent 5/20/20 was judah refill with reminder and tapered dispense.  --I see multiple voice mail and [employer] reminders sent to patient.  --Patient did read 5/6/2020 MyChart reminder.  --Due for labs.  --I tapered dispense for provider to authorize if ok.    --Plan in last office visit 11/18/2019 was to RTC in six months.    --Future Office Visit:  NONE      BP Readings from Last 6 Encounters:   01/06/20 (!) 140/89   12/16/19 128/80   11/18/19 (!) 148/96   06/17/19 (!) 152/91   04/01/19 140/86   03/22/19 129/86

## 2020-06-26 ENCOUNTER — TELEPHONE (OUTPATIENT)
Dept: FAMILY MEDICINE | Facility: CLINIC | Age: 65
End: 2020-06-26

## 2020-06-26 NOTE — LETTER
June 26, 2020      Cipriano Parry  [address]  [facility] [address]        To Whom It May Concern,       We refilled your lisinopril (ZESTRIL) 5 MG tablet for a two week supply.  You are due for a visit to receive a refill.      In order to keep all patients and staff safe during the COVID-19 pandemic, we ask that you schedule a virtual visit which includes a video or telephone visit with your provider.   Please call us at [phone] at your earliest convenience to schedule an appointment.       We greatly appreciate the opportunity to serve you and thank you for trusting us with your health care.        Your health care team at [facility]

## 2020-06-26 NOTE — TELEPHONE ENCOUNTER
[facility] informing patient that RX was refilled. Patient is due for virtual visit prior to additional refills given. Sending letter.    Shavonne MACHADO     [facility]

## 2020-06-26 NOTE — TELEPHONE ENCOUNTER
REFILL FOR TWO WEEKS. PLEASE ASK PATIENT TO SCHEDULE A VISIT WITH . THANK YOU.     Pt doesn't read his my chart  Please call him    Thanks!     Alyx Munoz RN

## 2020-07-06 ENCOUNTER — VIRTUAL VISIT (OUTPATIENT)
Dept: FAMILY MEDICINE | Facility: CLINIC | Age: 65
End: 2020-07-06
Payer: COMMERCIAL

## 2020-07-06 ENCOUNTER — E-VISIT (OUTPATIENT)
Dept: FAMILY MEDICINE | Facility: CLINIC | Age: 65
End: 2020-07-06

## 2020-07-06 VITALS — HEIGHT: 68 IN | BODY MASS INDEX: 24.25 KG/M2 | WEIGHT: 160 LBS

## 2020-07-06 DIAGNOSIS — Z53.9 ERRONEOUS ENCOUNTER--DISREGARD: Primary | ICD-10-CM

## 2020-07-06 DIAGNOSIS — E11.9 TYPE 2 DIABETES MELLITUS WITHOUT COMPLICATION, UNSPECIFIED WHETHER LONG TERM INSULIN USE (H): ICD-10-CM

## 2020-07-06 DIAGNOSIS — I10 ESSENTIAL HYPERTENSION: ICD-10-CM

## 2020-07-06 PROCEDURE — 99214 OFFICE O/P EST MOD 30 MIN: CPT | Mod: 95 | Performed by: FAMILY MEDICINE

## 2020-07-06 RX ORDER — LISINOPRIL 5 MG/1
5 TABLET ORAL DAILY
Qty: 90 TABLET | Refills: 0 | Status: SHIPPED | OUTPATIENT
Start: 2020-07-06 | End: 2020-07-22

## 2020-07-06 RX ORDER — METFORMIN HCL 500 MG
TABLET, EXTENDED RELEASE 24 HR ORAL
Qty: 90 TABLET | Refills: 0 | Status: SHIPPED | OUTPATIENT
Start: 2020-07-06 | End: 2020-07-21

## 2020-07-06 RX ORDER — SIMVASTATIN 10 MG
10 TABLET ORAL AT BEDTIME
Qty: 90 TABLET | Refills: 0 | Status: SHIPPED | OUTPATIENT
Start: 2020-07-06 | End: 2020-07-21

## 2020-07-06 ASSESSMENT — MIFFLIN-ST. JEOR: SCORE: 1490.26

## 2020-07-06 NOTE — PROGRESS NOTES
"Cipriano Parry is a 64 year old male who is being evaluated via a billable video visit.      The patient has been notified of following:     \"This video visit will be conducted via a call between you and your physician/provider. We have found that certain health care needs can be provided without the need for an in-person physical exam.  This service lets us provide the care you need with a video conversation.  If a prescription is necessary we can send it directly to your pharmacy.  If lab work is needed we can place an order for that and you can then stop by our lab to have the test done at a later time.    Video visits are billed at different rates depending on your insurance coverage.  Please reach out to your insurance provider with any questions.    If during the course of the call the physician/provider feels a video visit is not appropriate, you will not be charged for this service.\"    Patient has given verbal consent for Video visit? Yes  How would you like to obtain your AVS? Bharatt  Patient would like the video invitation sent by:   Will anyone else be joining your video visit? No     Subjective     Cipriano Parry is a 64 year old male who presents today via video visit for the following health issues:    HPI  Diabetes Follow-up    How often are you checking your blood sugar? One time daily  What time of day are you checking your blood sugars (select all that apply)?  Before and after meals  Have you had any blood sugars above 200?  No-110,130  Have you had any blood sugars below 70?  No    What symptoms do you notice when your blood sugar is low?  None    What concerns do you have today about your diabetes? None     Do you have any of these symptoms? (Select all that apply)  No numbness or tingling in feet.  No redness, sores or blisters on feet.  No complaints of excessive thirst.  No reports of blurry vision.  No significant changes to weight.              Hyperlipidemia Follow-Up      Are you " regularly taking any medication or supplement to lower your cholesterol?   Yes- simvastatin 10mg    Are you having muscle aches or other side effects that you think could be caused by your cholesterol lowering medication?  No    Hypertension Follow-up      Do you check your blood pressure regularly outside of the clinic? No     Are you following a low salt diet? Yes    Are your blood pressures ever more than 140 on the top number (systolic) OR more   than 90 on the bottom number (diastolic), for example 140/90? No    BP Readings from Last 2 Encounters:   01/06/20 (!) 140/89   12/16/19 128/80     Hemoglobin A1C (%)   Date Value   11/18/2019 6.9 (H)   03/22/2019 6.5 (H)     LDL Cholesterol Calculated (mg/dL)   Date Value   03/22/2019 71   02/26/2018 55         How many servings of fruits and vegetables do you eat daily?  2-3    On average, how many sweetened beverages do you drink each day (Examples: soda, juice, sweet tea, etc.  Do NOT count diet or artificially sweetened beverages)?   0    How many days per week do you exercise enough to make your heart beat faster? 7    How many minutes a day do you exercise enough to make your heart beat faster? 20 - 29    How many days per week do you miss taking your medication? 0      Video Start Time: 10:38 AM    He was updating his allergy and accidentally created E visit.  He does not have any skin issue and he does not need any intervention.    His insurance is changing and he is planning to follow-up in couple of months.  He denies any side effects with any of his medications.    Reviewed and updated as needed this visit by Provider         Review of Systems   Constitutional, HEENT, cardiovascular, pulmonary, gi and gu systems are negative, except as otherwise noted.      Objective             Physical Exam     GENERAL: Healthy, alert and no distress  EYES: Eyes grossly normal to inspection.  No discharge or erythema, or obvious scleral/conjunctival abnormalities.  RESP:  No audible wheeze, cough, or visible cyanosis.  No visible retractions or increased work of breathing.    SKIN: Visible skin clear. No significant rash, abnormal pigmentation or lesions.  NEURO: Cranial nerves grossly intact.  Mentation and speech appropriate for age.  PSYCH: Mentation appears normal, affect normal/bright, judgement and insight intact, normal speech and appearance well-groomed.              Assessment & Plan   (I10) Essential hypertension  Comment: low dose lisinopril.  Continue the same.  His last blood pressure was somewhat on higher side and he is going to come in for blood pressure recheck along with his lab appointment.  Plan: lisinopril (ZESTRIL) 5 MG tablet             (E11.9) Type 2 diabetes mellitus without complication, unspecified whether long term insulin use (H)  Comment: Continue the same Rx.  He is planning to come in for lab only appointment.  Plan: metFORMIN (GLUCOPHAGE-XR) 500 MG 24 hr tablet,         simvastatin (ZOCOR) 10 MG tablet, Hemoglobin         A1c, Comprehensive metabolic panel, Albumin         Random Urine Quantitative with Creat Ratio               Albino Venegas MD, MD  [facility]          Video-Visit Details    Type of service:  Video Visit    Video End Time:10:50 AM    Originating Location (pt. Location): Home    Distant Location (provider location):  home     Platform used for Video Visit: [facility]    No follow-ups on file.       Albino Venegas MD, MD

## 2020-07-06 NOTE — TELEPHONE ENCOUNTER
This encounter was opened in error. Please disregard.    Patient was entering his allergies to pcn.

## 2020-07-10 DIAGNOSIS — E11.9 TYPE 2 DIABETES MELLITUS WITHOUT COMPLICATION, UNSPECIFIED WHETHER LONG TERM INSULIN USE (H): ICD-10-CM

## 2020-07-11 RX ORDER — SIMVASTATIN 10 MG
TABLET ORAL
Qty: 30 TABLET | Refills: 0 | OUTPATIENT
Start: 2020-07-11

## 2020-07-13 ENCOUNTER — ALLIED HEALTH/NURSE VISIT (OUTPATIENT)
Dept: NURSING | Facility: CLINIC | Age: 65
End: 2020-07-13
Payer: COMMERCIAL

## 2020-07-13 VITALS — DIASTOLIC BLOOD PRESSURE: 82 MMHG | SYSTOLIC BLOOD PRESSURE: 128 MMHG

## 2020-07-13 DIAGNOSIS — E11.9 TYPE 2 DIABETES MELLITUS WITHOUT COMPLICATION, UNSPECIFIED WHETHER LONG TERM INSULIN USE (H): ICD-10-CM

## 2020-07-13 DIAGNOSIS — Z01.30 BP CHECK: Primary | ICD-10-CM

## 2020-07-13 LAB
ALBUMIN SERPL-MCNC: 4.5 G/DL (ref 3.4–5)
ALP SERPL-CCNC: 54 U/L (ref 40–150)
ALT SERPL W P-5'-P-CCNC: 66 U/L (ref 0–70)
ANION GAP SERPL CALCULATED.3IONS-SCNC: 9 MMOL/L (ref 3–14)
AST SERPL W P-5'-P-CCNC: 41 U/L (ref 0–45)
BILIRUB SERPL-MCNC: 0.8 MG/DL (ref 0.2–1.3)
BUN SERPL-MCNC: 20 MG/DL (ref 7–30)
CALCIUM SERPL-MCNC: 9.4 MG/DL (ref 8.5–10.1)
CHLORIDE SERPL-SCNC: 107 MMOL/L (ref 94–109)
CHOLEST SERPL-MCNC: 88 MG/DL
CO2 SERPL-SCNC: 22 MMOL/L (ref 20–32)
CREAT SERPL-MCNC: 0.94 MG/DL (ref 0.66–1.25)
CREAT UR-MCNC: 53 MG/DL
GFR SERPL CREATININE-BSD FRML MDRD: 85 ML/MIN/{1.73_M2}
GLUCOSE SERPL-MCNC: 139 MG/DL (ref 70–99)
HBA1C MFR BLD: 6.9 % (ref 0–5.6)
HDLC SERPL-MCNC: 27 MG/DL
LDLC SERPL CALC-MCNC: 38 MG/DL
MICROALBUMIN UR-MCNC: <5 MG/L
MICROALBUMIN/CREAT UR: NORMAL MG/G CR (ref 0–17)
NONHDLC SERPL-MCNC: 61 MG/DL
POTASSIUM SERPL-SCNC: 4.7 MMOL/L (ref 3.4–5.3)
PROT SERPL-MCNC: 7.9 G/DL (ref 6.8–8.8)
SODIUM SERPL-SCNC: 138 MMOL/L (ref 133–144)
TRIGL SERPL-MCNC: 116 MG/DL

## 2020-07-13 PROCEDURE — 82043 UR ALBUMIN QUANTITATIVE: CPT | Performed by: FAMILY MEDICINE

## 2020-07-13 PROCEDURE — 99207 ZZC NO CHARGE NURSE ONLY: CPT

## 2020-07-13 PROCEDURE — 36415 COLL VENOUS BLD VENIPUNCTURE: CPT | Performed by: FAMILY MEDICINE

## 2020-07-13 PROCEDURE — 80061 LIPID PANEL: CPT | Performed by: FAMILY MEDICINE

## 2020-07-13 PROCEDURE — 80053 COMPREHEN METABOLIC PANEL: CPT | Performed by: FAMILY MEDICINE

## 2020-07-13 PROCEDURE — 83036 HEMOGLOBIN GLYCOSYLATED A1C: CPT | Performed by: FAMILY MEDICINE

## 2020-07-13 NOTE — NURSING NOTE
Cipriano Parry is a 64 year old patient who comes in today for a Blood Pressure check.  Initial BP:  /82 (BP Location: Right arm, Patient Position: Sitting, Cuff Size: Adult Regular)      Data Unavailable  Disposition: follow-up as previously indicated by provider      Jhonathan Bustos MA

## 2020-10-14 DIAGNOSIS — E11.9 TYPE 2 DIABETES MELLITUS WITHOUT COMPLICATION, UNSPECIFIED WHETHER LONG TERM INSULIN USE (H): ICD-10-CM

## 2020-10-14 RX ORDER — METFORMIN HCL 500 MG
TABLET, EXTENDED RELEASE 24 HR ORAL
Qty: 90 TABLET | Refills: 0 | Status: SHIPPED | OUTPATIENT
Start: 2020-10-14 | End: 2021-02-03

## 2020-11-18 DIAGNOSIS — I10 ESSENTIAL HYPERTENSION: ICD-10-CM

## 2020-11-20 ENCOUNTER — MYC MEDICAL ADVICE (OUTPATIENT)
Dept: FAMILY MEDICINE | Facility: CLINIC | Age: 65
End: 2020-11-20

## 2020-11-20 DIAGNOSIS — I10 ESSENTIAL HYPERTENSION: Primary | ICD-10-CM

## 2020-11-20 RX ORDER — LISINOPRIL 10 MG/1
10 TABLET ORAL DAILY
Qty: 90 TABLET | Refills: 3 | Status: SHIPPED | OUTPATIENT
Start: 2020-11-20 | End: 2021-11-22

## 2020-11-20 RX ORDER — LISINOPRIL 5 MG/1
TABLET ORAL
Qty: 90 TABLET | Refills: 0 | Status: SHIPPED | OUTPATIENT
Start: 2020-11-20 | End: 2021-02-03 | Stop reason: DRUGHIGH

## 2020-11-20 NOTE — TELEPHONE ENCOUNTER
Patient stated he is taking 2 of the lisinopril 5 mg daily. Please sign off on new script if correct.  Thank you

## 2020-12-27 ENCOUNTER — HEALTH MAINTENANCE LETTER (OUTPATIENT)
Age: 65
End: 2020-12-27

## 2021-01-01 ENCOUNTER — TRANSFERRED RECORDS (OUTPATIENT)
Dept: HEALTH INFORMATION MANAGEMENT | Facility: CLINIC | Age: 66
End: 2021-01-01
Payer: COMMERCIAL

## 2021-01-01 LAB — RETINOPATHY: NORMAL

## 2021-01-15 ENCOUNTER — HEALTH MAINTENANCE LETTER (OUTPATIENT)
Age: 66
End: 2021-01-15

## 2021-02-03 ENCOUNTER — OFFICE VISIT (OUTPATIENT)
Dept: FAMILY MEDICINE | Facility: CLINIC | Age: 66
End: 2021-02-03
Payer: COMMERCIAL

## 2021-02-03 VITALS
HEIGHT: 68 IN | WEIGHT: 143.2 LBS | DIASTOLIC BLOOD PRESSURE: 86 MMHG | OXYGEN SATURATION: 96 % | RESPIRATION RATE: 16 BRPM | SYSTOLIC BLOOD PRESSURE: 122 MMHG | TEMPERATURE: 97.7 F | HEART RATE: 93 BPM | BODY MASS INDEX: 21.7 KG/M2

## 2021-02-03 DIAGNOSIS — Z12.5 SCREENING PSA (PROSTATE SPECIFIC ANTIGEN): ICD-10-CM

## 2021-02-03 DIAGNOSIS — Z00.00 ENCOUNTER FOR MEDICARE ANNUAL WELLNESS EXAM: Primary | ICD-10-CM

## 2021-02-03 DIAGNOSIS — E11.9 TYPE 2 DIABETES MELLITUS WITHOUT COMPLICATION, UNSPECIFIED WHETHER LONG TERM INSULIN USE (H): ICD-10-CM

## 2021-02-03 LAB
HBA1C MFR BLD: 7.4 % (ref 0–5.6)
PSA SERPL-ACNC: 3.12 UG/L (ref 0–4)

## 2021-02-03 PROCEDURE — G0103 PSA SCREENING: HCPCS | Performed by: FAMILY MEDICINE

## 2021-02-03 PROCEDURE — 36415 COLL VENOUS BLD VENIPUNCTURE: CPT | Performed by: FAMILY MEDICINE

## 2021-02-03 PROCEDURE — 83036 HEMOGLOBIN GLYCOSYLATED A1C: CPT | Performed by: FAMILY MEDICINE

## 2021-02-03 PROCEDURE — G0402 INITIAL PREVENTIVE EXAM: HCPCS | Performed by: FAMILY MEDICINE

## 2021-02-03 RX ORDER — METFORMIN HCL 500 MG
TABLET, EXTENDED RELEASE 24 HR ORAL
Qty: 90 TABLET | Refills: 1 | Status: SHIPPED | OUTPATIENT
Start: 2021-02-03 | End: 2021-09-23

## 2021-02-03 RX ORDER — SIMVASTATIN 10 MG
10 TABLET ORAL AT BEDTIME
Qty: 90 TABLET | Refills: 0 | Status: SHIPPED | OUTPATIENT
Start: 2021-04-01 | End: 2021-09-23

## 2021-02-03 ASSESSMENT — MIFFLIN-ST. JEOR: SCORE: 1405.08

## 2021-02-03 NOTE — PATIENT INSTRUCTIONS
Patient Education   Personalized Prevention Plan  You are due for the preventive services outlined below.  Your care team is available to assist you in scheduling these services.  If you have already completed any of these items, please share that information with your care team to update in your medical record.  Health Maintenance Due   Topic Date Due     FALL RISK ASSESSMENT  08/10/2020     Diabetic Foot Exam  11/18/2020     Discuss Advance Care Planning  12/30/2020     PHQ-2  01/01/2021     A1C Lab  01/13/2021     Eye Exam  01/13/2021

## 2021-02-03 NOTE — PROGRESS NOTES
"  SUBJECTIVE:   Cipriano Parry is a 65 year old male who presents for Preventive Visit.    Patient has been advised of split billing requirements and indicates understanding: Yes  Are you in the first 12 months of your Medicare Part B coverage?  Yes,  Visual Acuity:  Right Eye: 20/50   Left Eye: 20/50  Both Eyes: 20/40    Physical Health:    In general, how would you rate your overall physical health? good    Outside of work, how many days during the week do you exercise? none    Outside of work, approximately how many minutes a day do you exercise?less than 15 minutes    If you drink alcohol do you typically have >3 drinks per day or >7 drinks per week? No    Do you usually eat at least 4 servings of fruit and vegetables a day, include whole grains & fiber and avoid regularly eating high fat or \"junk\" foods? NO- not all the time.    Do you have any problems taking medications regularly?  YES    Do you have any side effects from medications? none    Needs assistance for the following daily activities: no assistance needed    Which of the following safety concerns are present in your home?  none identified     Hearing impairment: No    In the past 6 months, have you been bothered by leaking of urine? no    Mental Health:    In general, how would you rate your overall mental or emotional health? good  PHQ-2 Score:      Do you feel safe in your environment? Yes    Have you ever done Advance Care Planning? (For example, a Health Directive, POLST, or a discussion with a medical provider or your loved ones about your wishes): No, advance care planning information given to patient to review.  Advanced care planning was discussed at today's visit.    Additional concerns to address?  No    Fall risk:  Fallen 2 or more times in the past year?: No  Any fall with injury in the past year?: No    Cognitive Screenin) Repeat 3 items (Leader, Season, Table)    2) Clock draw: NORMAL  3) 3 item recall: Recalls 2 objects "   Results: NORMAL clock, 1-2 items recalled: COGNITIVE IMPAIRMENT LESS LIKELY    Mini-CogTM Copyright KEITH Iyer. Licensed by the author for use in [facility]; reprinted with permission (juan luis@[facility]). All rights reserved.      Do you have sleep apnea, excessive snoring or daytime drowsiness?: yes         Reviewed and updated as needed this visit by clinical staff  Tobacco  Allergies  Meds   Med Hx  Surg Hx  Fam Hx  Soc Hx        Reviewed and updated as needed this visit by Provider                Social History     Tobacco Use     Smoking status: Never Smoker     Smokeless tobacco: Never Used   Substance Use Topics     Alcohol use: Not Currently     Alcohol/week: 0.0 standard drinks     Comment: 0-1 drinks per week                     Current providers sharing in care for this patient include:     Patient and wife both got covid. Stayed home. Lost around 10 lbs.   Wife had complicated hospitalization.     Blood sugars - may be running high. This morning 140. Last night 180 after eating.     Working part time. Will be done with work in April.     Patient Care Team:  Albino Venegas MD as PCP - General (Family Practice)  Albino Venegas MD as Assigned PCP  Meenakshi Blue MD as MD (Hematology & Oncology)  Sindi Viramontes, RN as Nurse Coordinator (Hematology & Oncology)  Mariano Mclain DPM as Assigned Musculoskeletal Provider    The following health maintenance items are reviewed in Epic and correct as of today:  Health Maintenance   Topic Date Due     FALL RISK ASSESSMENT  08/10/2020     DIABETIC FOOT EXAM  11/18/2020     EYE EXAM  01/13/2021     BMP  07/13/2021     LIPID  07/13/2021     MICROALBUMIN  07/13/2021     A1C  08/03/2021     Pneumococcal Vaccine: 65+ Years (1 of 1 - PPSV23) 01/23/2022     MEDICARE ANNUAL WELLNESS VISIT  02/03/2022     DTAP/TDAP/TD IMMUNIZATION (2 - Td) 12/30/2025     ADVANCE CARE PLANNING  02/03/2026     COLORECTAL CANCER SCREENING   "11/06/2027     HEPATITIS C SCREENING  Completed     HIV SCREENING  Completed     PHQ-2  Completed     INFLUENZA VACCINE  Completed     ZOSTER IMMUNIZATION  Completed     AORTIC ANEURYSM SCREENING (SYSTEM ASSIGNED)  Completed     Pneumococcal Vaccine: Pediatrics (0 to 5 Years) and At-Risk Patients (6 to 64 Years)  Aged Out     IPV IMMUNIZATION  Aged Out     MENINGITIS IMMUNIZATION  Aged Out     ROS:  Constitutional, HEENT, cardiovascular, pulmonary, gi and gu systems are negative, except as otherwise noted.    OBJECTIVE:   /86 (BP Location: Left arm, Patient Position: Sitting, Cuff Size: Adult Regular)   Pulse 93   Temp 97.7  F (36.5  C) (Tympanic)   Resp 16   Ht 1.721 m (5' 7.75\")   Wt 65 kg (143 lb 3.2 oz)   SpO2 96%   BMI 21.93 kg/m   Estimated body mass index is 21.93 kg/m  as calculated from the following:    Height as of this encounter: 1.721 m (5' 7.75\").    Weight as of this encounter: 65 kg (143 lb 3.2 oz).  EXAM:   GENERAL: healthy, alert and no distress  EYES: Eyes grossly normal to inspection, PERRL and conjunctivae and sclerae normal  HENT: ear canals and TM's completely covered with ear wax.   NECK: no adenopathy, no asymmetry, masses, or scars and thyroid normal to palpation  RESP: lungs clear to auscultation - no rales, rhonchi or wheezes  CV: regular rate and rhythm, normal S1 S2, no S3 or S4, no murmur, click or rub, no peripheral edema and peripheral pulses strong  ABDOMEN: soft, nontender, no hepatosplenomegaly, no masses and bowel sounds normal  MS: no gross musculoskeletal defects noted, no edema  SKIN: no suspicious lesions or rashes  NEURO: Normal strength and tone, mentation intact and speech normal  PSYCH: mentation appears normal, affect normal/bright         ASSESSMENT / PLAN:   1. Encounter for Medicare annual wellness exam       2. Type 2 diabetes mellitus without complication, unspecified whether long term insulin use (H)     - HEMOGLOBIN A1C  - metFORMIN (GLUCOPHAGE-XR) " "500 MG 24 hr tablet; TAKE 1 TABLET (500 MG) BY MOUTH EVERY MORNING.  Dispense: 90 tablet; Refill: 1  - simvastatin (ZOCOR) 10 MG tablet; Take 1 tablet (10 mg) by mouth At Bedtime  Dispense: 90 tablet; Refill: 0    3. Screening PSA (prostate specific antigen)     - PSA, screen    Patient has been advised of split billing requirements and indicates understanding: No    COUNSELING:  Reviewed preventive health counseling, as reflected in patient instructions  Special attention given to:       Regular exercise       Healthy diet/nutrition       Vision screening       Hearing screening       Dental care       Bladder control       Fall risk prevention    Estimated body mass index is 21.93 kg/m  as calculated from the following:    Height as of this encounter: 1.721 m (5' 7.75\").    Weight as of this encounter: 65 kg (143 lb 3.2 oz).    Weight management plan noted, stable and monitoring and recommended him to continue to keep an eye on his weight.     He reports that he has never smoked. He has never used smokeless tobacco.    Appropriate preventive services were discussed with this patient, including applicable screening as appropriate for cardiovascular disease, diabetes, osteopenia/osteoporosis, and glaucoma.  As appropriate for age/gender, discussed screening for colorectal cancer, prostate cancer, breast cancer, and cervical cancer. Checklist reviewing preventive services available has been given to the patient.    Reviewed patients plan of care and provided an AVS. The Basic Care Plan (routine screening as documented in Health Maintenance) for Cipriano meets the Care Plan requirement. This Care Plan has been established and reviewed with the Patient.    Counseling Resources:  ATP IV Guidelines  Pooled Cohorts Equation Calculator  Breast Cancer Risk Calculator  BRCA-Related Cancer Risk Assessment: FHS-7 Tool  FRAX Risk Assessment  ICSI Preventive Guidelines  Dietary Guidelines for Americans, 2010  USDA's MyPlate  ASA " Prophylaxis  Lung CA Screening    Albino Venegas MD  [facility]

## 2021-02-24 ENCOUNTER — MYC MEDICAL ADVICE (OUTPATIENT)
Dept: FAMILY MEDICINE | Facility: CLINIC | Age: 66
End: 2021-02-24

## 2021-02-24 DIAGNOSIS — H91.92 HEARING LOSS OF LEFT EAR, UNSPECIFIED HEARING LOSS TYPE: Primary | ICD-10-CM

## 2021-02-24 NOTE — TELEPHONE ENCOUNTER
Dr. Venegas-Please review patient's messages and advise if office visit appropriate, or should patient schedule directly with ENT?    Thank you!  DIANDRA WestfallN, RN  [facility]

## 2021-02-24 NOTE — TELEPHONE ENCOUNTER
Appt tentatively scheduled on 3/1/21.    Writer responded via [employer].  NACHO Westfall, RN  [facility]

## 2021-02-24 NOTE — TELEPHONE ENCOUNTER
Writer responded via [employer].  DIANDRA WestfallN, RN  [facility]

## 2021-02-25 NOTE — TELEPHONE ENCOUNTER
Noted.    No further action needed from triage.  DIANDRA WestfallN, RN  [facility]

## 2021-03-01 ENCOUNTER — OFFICE VISIT (OUTPATIENT)
Dept: FAMILY MEDICINE | Facility: CLINIC | Age: 66
End: 2021-03-01
Payer: COMMERCIAL

## 2021-03-01 VITALS
HEART RATE: 80 BPM | TEMPERATURE: 97.5 F | BODY MASS INDEX: 23.49 KG/M2 | HEIGHT: 68 IN | RESPIRATION RATE: 16 BRPM | OXYGEN SATURATION: 98 % | DIASTOLIC BLOOD PRESSURE: 86 MMHG | SYSTOLIC BLOOD PRESSURE: 118 MMHG | WEIGHT: 155 LBS

## 2021-03-01 DIAGNOSIS — H61.23 EXCESSIVE EAR WAX, BILATERAL: Primary | ICD-10-CM

## 2021-03-01 PROCEDURE — 69209 REMOVE IMPACTED EAR WAX UNI: CPT | Mod: 50 | Performed by: FAMILY MEDICINE

## 2021-03-01 ASSESSMENT — MIFFLIN-ST. JEOR: SCORE: 1462.58

## 2021-03-01 NOTE — PATIENT INSTRUCTIONS
We are working hard to begin vaccinating more people against COVID-19. Currently, we are only vaccinating Phase 1a workers - healthcare workers who are unable to do their job remotely. Vaccine availability is very limited.      If you are a healthcare worker and you are unable to do your job remotely, please log in to [employer] using this link to see if we have openings and schedule an appointment. At your vaccine appointment, you will be asked to provide proof of employment as a health care worker. If you cannot, you will be turned away.     Vaccine appointments are being added as they become available. Please check your [employer] account frequently for availability.  If you have technical difficulty using [employer], call [phone] for assistance.     You can learn more about the state's phased approach to administering the vaccine, with details on each phase, here.      Phase 1b is the next group that will get vaccinated and includes frontline essential workers and adults 75 years of age and older. When we are able to start vaccinating this group, we will share that information on our website. Check this website to stay up to date on COVID-19 vaccination information.        Did you know?      You can schedule a video visit for follow-up appointments as well as future appointments for certain conditions.  Please see the below link.     [email or web address]    If you have not already done so,  I encourage you to sign up for 5 CUPS and some sugar ([email or web address]).  This will allow you to review your results, securely communicate with a provider, and schedule virtual visits as well.

## 2021-03-01 NOTE — PROGRESS NOTES
Assessment & Plan     Excessive ear wax, bilateral  Ear wash bilaterally by MA.  Improvement in his symptoms after ear wash.  Discussed the use of few drops of mineral oil periodically in both ears for now before he goes to bed and use water to rinse his ears.  - CO REMOVAL IMPACTED CERUMEN IRRIGATION/LVG JOSI Venegas MD, MD  [facility]    Jomar Cota is a 65 year old who presents for the following health issues     HPI       Concern - ear problem    Onset: January   Description: left ear hearing loss, pressure, plugged  Intensity: severe  Progression of Symptoms:  worsening  Accompanying Signs & Symptoms: ringing in ears  Previous history of similar problem: has had hearing loss   Precipitating factors:        Worsened by: none   Alleviating factors:        Improved by: moving jaw around   Therapies tried and outcome:  none      left ear - could not hear on left side.   Usually uses right side phone and tried on left side - hard to hear.     No qtips.          Review of Systems         Objective    There were no vitals taken for this visit.  There is no height or weight on file to calculate BMI.  Physical Exam   Significant ear wax both ears. Ear canals and TM can not be visualized due to that

## 2021-03-02 NOTE — NURSING NOTE
Cipriano Parry is a 65 year old male who presents in clinic with complaint of impacted ear wax (cerumen).  Per the order of Dr Venegas, ear wax was removed from both sides by flushing with warm water and peroxide 3% solution and manual debridement has not been performed. Patient denies pain/dizziness/discharge/drainage  (if yes, stop procedure and huddle with provider).  Ear wax has been successfully removed. (If not, huddle with provider).   Sue Gutierrez, [facility]

## 2021-03-07 ENCOUNTER — IMMUNIZATION (OUTPATIENT)
Dept: NURSING | Facility: CLINIC | Age: 66
End: 2021-03-07
Payer: COMMERCIAL

## 2021-03-07 PROCEDURE — 0031A PR COVID VAC JANSSEN AD26 0.5ML: CPT

## 2021-03-07 PROCEDURE — 91303 PR COVID VAC JANSSEN AD26 0.5ML: CPT

## 2021-08-08 ENCOUNTER — HEALTH MAINTENANCE LETTER (OUTPATIENT)
Age: 66
End: 2021-08-08

## 2021-10-04 ENCOUNTER — HEALTH MAINTENANCE LETTER (OUTPATIENT)
Age: 66
End: 2021-10-04

## 2021-10-15 ENCOUNTER — TRANSFERRED RECORDS (OUTPATIENT)
Dept: HEALTH INFORMATION MANAGEMENT | Facility: CLINIC | Age: 66
End: 2021-10-15
Payer: COMMERCIAL

## 2021-10-15 LAB — RETINOPATHY: NORMAL

## 2021-10-25 ENCOUNTER — MYC MEDICAL ADVICE (OUTPATIENT)
Dept: FAMILY MEDICINE | Facility: CLINIC | Age: 66
End: 2021-10-25

## 2021-11-19 ENCOUNTER — LAB (OUTPATIENT)
Dept: LAB | Facility: CLINIC | Age: 66
End: 2021-11-19
Payer: COMMERCIAL

## 2021-11-19 DIAGNOSIS — E11.9 TYPE 2 DIABETES MELLITUS WITHOUT COMPLICATION, UNSPECIFIED WHETHER LONG TERM INSULIN USE (H): ICD-10-CM

## 2021-11-19 LAB
CHOLEST SERPL-MCNC: 113 MG/DL
CREAT SERPL-MCNC: 0.91 MG/DL (ref 0.66–1.25)
FASTING STATUS PATIENT QL REPORTED: NO
GFR SERPL CREATININE-BSD FRML MDRD: 88 ML/MIN/1.73M2
HBA1C MFR BLD: 9.9 % (ref 0–5.6)
HDLC SERPL-MCNC: 27 MG/DL
LDLC SERPL CALC-MCNC: 45 MG/DL
NONHDLC SERPL-MCNC: 86 MG/DL
TRIGL SERPL-MCNC: 205 MG/DL

## 2021-11-19 PROCEDURE — 82565 ASSAY OF CREATININE: CPT

## 2021-11-19 PROCEDURE — 36415 COLL VENOUS BLD VENIPUNCTURE: CPT

## 2021-11-19 PROCEDURE — 83036 HEMOGLOBIN GLYCOSYLATED A1C: CPT

## 2021-11-19 PROCEDURE — 80061 LIPID PANEL: CPT

## 2021-11-22 ENCOUNTER — OFFICE VISIT (OUTPATIENT)
Dept: FAMILY MEDICINE | Facility: CLINIC | Age: 66
End: 2021-11-22
Payer: COMMERCIAL

## 2021-11-22 VITALS
TEMPERATURE: 97.9 F | HEART RATE: 86 BPM | OXYGEN SATURATION: 97 % | WEIGHT: 159 LBS | SYSTOLIC BLOOD PRESSURE: 128 MMHG | DIASTOLIC BLOOD PRESSURE: 86 MMHG | BODY MASS INDEX: 24.1 KG/M2 | HEIGHT: 68 IN | RESPIRATION RATE: 16 BRPM

## 2021-11-22 DIAGNOSIS — I10 ESSENTIAL HYPERTENSION: ICD-10-CM

## 2021-11-22 DIAGNOSIS — D69.6 THROMBOCYTOPENIA, UNSPECIFIED (H): ICD-10-CM

## 2021-11-22 DIAGNOSIS — C18.7 MALIGNANT NEOPLASM OF SIGMOID COLON (H): ICD-10-CM

## 2021-11-22 DIAGNOSIS — E11.9 TYPE 2 DIABETES MELLITUS WITHOUT COMPLICATION, UNSPECIFIED WHETHER LONG TERM INSULIN USE (H): ICD-10-CM

## 2021-11-22 PROCEDURE — 99214 OFFICE O/P EST MOD 30 MIN: CPT | Performed by: FAMILY MEDICINE

## 2021-11-22 RX ORDER — LISINOPRIL 10 MG/1
10 TABLET ORAL DAILY
Qty: 90 TABLET | Refills: 3 | Status: SHIPPED | OUTPATIENT
Start: 2021-11-22 | End: 2022-11-15

## 2021-11-22 RX ORDER — METFORMIN HCL 500 MG
2000 TABLET, EXTENDED RELEASE 24 HR ORAL
Qty: 360 TABLET | Refills: 0 | Status: SHIPPED | OUTPATIENT
Start: 2021-11-22 | End: 2022-02-16

## 2021-11-22 RX ORDER — SIMVASTATIN 10 MG
10 TABLET ORAL AT BEDTIME
Qty: 90 TABLET | Refills: 3 | Status: SHIPPED | OUTPATIENT
Start: 2021-11-22 | End: 2023-02-01

## 2021-11-22 ASSESSMENT — MIFFLIN-ST. JEOR: SCORE: 1475.72

## 2021-11-22 NOTE — PROGRESS NOTES
Assessment & Plan     Essential hypertension  Patient is tolerating current medication without any major side effects of concerns and current dose seems reasonable too.  Current medication regime is effective. Continue current treatment without any changes.   - lisinopril (ZESTRIL) 10 MG tablet; Take 1 tablet (10 mg) by mouth daily    Type 2 diabetes mellitus without complication, unspecified whether long term insulin use (H)  Diabetes recent camping his A1c significantly worsened due to change in his due to the activities.  We agreed to increase Metformin to max dose temporarily and cut back to his routine dose once his A1c is back to normal.  He has already improved his diet and his blood sugars are improving.  - metFORMIN (GLUCOPHAGE-XR) 500 MG 24 hr tablet; Take 4 tablets (2,000 mg) by mouth daily (with dinner)  - simvastatin (ZOCOR) 10 MG tablet; Take 1 tablet (10 mg) by mouth At Bedtime  - blood glucose (NO BRAND SPECIFIED) test strip; Use to test blood sugar 1 times daily or as directed. Ascenia meter or per insurance  - blood glucose (NO BRAND SPECIFIED) test strip; Use to test blood sugar 1 times daily or as directed.    Malignant neoplasm of sigmoid colon (H)  Status post resection and been to oncology.  Was advised every other year follow-up and will be due this year    Thrombocytopenia, unspecified (H)  Mild but no recent check.  We will consider rechecking next time.  Asymptomatic                   Return in about 3 months (around 2/22/2022).    Albino Venegas MD, MD  [facility]    Jomar Cota is a 66 year old who presents for the following health issues     HPI     Diabetes Follow-up      How often are you checking your blood sugar? Not at all while camping trip for 3 months but now testing 2x a day, morning and evening. 180 this morning     What concerns do you have today about your diabetes? None     Do you have any of these symptoms? (Select all that  apply)  No numbness or tingling in feet.  No redness, sores or blisters on feet.  No complaints of excessive thirst.  No reports of blurry vision.  No significant changes to weight.    Have you had a diabetic eye exam in the last 12 months? Yes- Date of last eye exam: 2021,  [facility]    BP Readings from Last 2 Encounters:   03/01/21 118/86   02/03/21 122/86     Hemoglobin A1C (%)   Date Value   11/19/2021 9.9 (H)   02/03/2021 7.4 (H)   07/13/2020 6.9 (H)     LDL Cholesterol Calculated (mg/dL)   Date Value   11/19/2021 45   07/13/2020 38   03/22/2019 71               Hypertension Follow-up      Do you check your blood pressure regularly outside of the clinic? Yes     Are you following a low salt diet? No    Are your blood pressures ever more than 140 on the top number (systolic) OR more   than 90 on the bottom number (diastolic), for example 140/90? No      How many servings of fruits and vegetables do you eat daily?  2-3    On average, how many sweetened beverages do you drink each day (Examples: soda, juice, sweet tea, etc.  Do NOT count diet or artificially sweetened beverages)?   0    How many days per week do you exercise enough to make your heart beat faster? 3 or less    How many minutes a day do you exercise enough to make your heart beat faster? 9 or less    How many days per week do you miss taking your medication? 0    Was camping for last 4 months.   Was off his routine.   For last 2 weeks back to routine.     Now retired.     Been to eye doctor this year.     Hemoglobin A1C   Date Value Ref Range Status   11/19/2021 9.9 (H) 0.0 - 5.6 % Final     Comment:     Normal <5.7%   Prediabetes 5.7-6.4%    Diabetes 6.5% or higher     Note: Adopted from ADA consensus guidelines.   02/03/2021 7.4 (H) 0 - 5.6 % Final     Comment:     Normal <5.7% Prediabetes 5.7-6.4%  Diabetes 6.5% or higher - adopted from ADA   consensus guidelines.     07/13/2020 6.9 (H) 0 - 5.6 % Final     Comment:      Normal <5.7% Prediabetes 5.7-6.4%  Diabetes 6.5% or higher - adopted from ADA   consensus guidelines.     11/18/2019 6.9 (H) 0 - 5.6 % Final     Comment:     Normal <5.7% Prediabetes 5.7-6.4%  Diabetes 6.5% or higher - adopted from ADA   consensus guidelines.         Review of Systems         Objective    There were no vitals taken for this visit.  There is no height or weight on file to calculate BMI.  Physical Exam

## 2021-11-23 PROBLEM — D69.6 THROMBOCYTOPENIA, UNSPECIFIED (H): Status: ACTIVE | Noted: 2021-11-23

## 2022-02-15 DIAGNOSIS — E11.9 TYPE 2 DIABETES MELLITUS WITHOUT COMPLICATION, UNSPECIFIED WHETHER LONG TERM INSULIN USE (H): ICD-10-CM

## 2022-02-16 ENCOUNTER — OFFICE VISIT (OUTPATIENT)
Dept: FAMILY MEDICINE | Facility: CLINIC | Age: 67
End: 2022-02-16
Payer: COMMERCIAL

## 2022-02-16 VITALS
SYSTOLIC BLOOD PRESSURE: 122 MMHG | DIASTOLIC BLOOD PRESSURE: 80 MMHG | OXYGEN SATURATION: 98 % | TEMPERATURE: 97.6 F | HEART RATE: 86 BPM

## 2022-02-16 DIAGNOSIS — E11.9 TYPE 2 DIABETES MELLITUS WITHOUT COMPLICATION, UNSPECIFIED WHETHER LONG TERM INSULIN USE (H): Primary | ICD-10-CM

## 2022-02-16 DIAGNOSIS — I10 ESSENTIAL HYPERTENSION: ICD-10-CM

## 2022-02-16 LAB — HBA1C MFR BLD: 7.5 % (ref 0–5.6)

## 2022-02-16 PROCEDURE — 83036 HEMOGLOBIN GLYCOSYLATED A1C: CPT | Performed by: FAMILY MEDICINE

## 2022-02-16 PROCEDURE — 36415 COLL VENOUS BLD VENIPUNCTURE: CPT | Performed by: FAMILY MEDICINE

## 2022-02-16 PROCEDURE — 99214 OFFICE O/P EST MOD 30 MIN: CPT | Performed by: FAMILY MEDICINE

## 2022-02-16 RX ORDER — METFORMIN HCL 500 MG
2000 TABLET, EXTENDED RELEASE 24 HR ORAL
Qty: 360 TABLET | Refills: 1 | Status: SHIPPED | OUTPATIENT
Start: 2022-02-16 | End: 2022-06-15

## 2022-02-16 NOTE — PROGRESS NOTES
Assessment & Plan     Type 2 diabetes mellitus without complication, unspecified whether long term insulin use (H)  Improved. Patient is tolerating current medication without any major side effects of concerns and current dose seems reasonable too.  Current medication regime is effective. Continue current treatment without any changes.   Due for other labs - ok to wait until his physical.   - Hemoglobin A1c; Future  - Hemoglobin A1c  - metFORMIN (GLUCOPHAGE-XR) 500 MG 24 hr tablet; Take 4 tablets (2,000 mg) by mouth daily (with dinner)    Essential hypertension  Patient is tolerating current medication without any major side effects of concerns and current dose seems reasonable too.  Current medication regime is effective. Continue current treatment without any changes.                    Return in about 3 months (around 5/16/2022).    Albino Venegas MD, MD  [facility]    Jomar Cota is a 66 year old who presents for the following health issues     History of Present Illness       Diabetes:   He presents for follow up of diabetes.  He is checking home blood glucose one time daily. He checks blood glucose before meals.  Blood glucose is sometimes over 200 and never under 70. When his blood glucose is low, the patient is asymptomatic for confusion, blurred vision, lethargy and reports not feeling dizzy, shaky, or weak.  He is concerned about blood sugar frequently over 200.  He is not experiencing numbness or burning in feet, excessive thirst, blurry vision, weight changes or redness, sores or blisters on feet. The patient has had a diabetic eye exam in the last 12 months. Eye exam performed on 10/15/2021. Location of last eye exam [address] [place] [address].        He eats 2-3 servings of fruits and vegetables daily.He consumes 0 sweetened beverage(s) daily.He exercises with enough effort to increase his heart rate 9 or less minutes per day.  He exercises with enough effort to  increase his heart rate 3 or less days per week.   He is taking medications regularly.     Doing better with his diet. Not able to do much exercise.     Wife had back surgery and it was tough for him as he was primary caretaker and he was cooking and doing dishes which he has not done in years.     Works part time and hoping to start next week.     Oct 2021 - eye exam - it was normal.     Using 4 metformin per day.     Review of Systems         Objective    /80 (BP Location: Right arm, Patient Position: Sitting, Cuff Size: Adult Regular)   Pulse 86   Temp 97.6  F (36.4  C) (Temporal)   SpO2 98%   There is no height or weight on file to calculate BMI.  Physical Exam

## 2022-02-16 NOTE — PATIENT INSTRUCTIONS
Did you know?      You can schedule a video visit for follow-up appointments as well as future appointments for certain conditions.  Please see the below link.     [email or web address]    If you have not already done so,  I encourage you to sign up for [employer] ([email or web address]  This will allow you to review your results, securely communicate with a provider, and schedule virtual visits as well.

## 2022-02-17 RX ORDER — METFORMIN HCL 500 MG
2000 TABLET, EXTENDED RELEASE 24 HR ORAL
Qty: 360 TABLET | Refills: 0 | OUTPATIENT
Start: 2022-02-17

## 2022-03-20 ENCOUNTER — HEALTH MAINTENANCE LETTER (OUTPATIENT)
Age: 67
End: 2022-03-20

## 2022-05-02 ENCOUNTER — TELEPHONE (OUTPATIENT)
Dept: SURGERY | Facility: CLINIC | Age: 67
End: 2022-05-02
Payer: COMMERCIAL

## 2022-05-02 DIAGNOSIS — Z12.11 ENCOUNTER FOR SCREENING COLONOSCOPY: Primary | ICD-10-CM

## 2022-05-02 NOTE — TELEPHONE ENCOUNTER
Patient called in, thinks he is due for a 3 yr colonoscopy with Dr. Spivey. I did not see any notes about this, so sent a message to his nurse UNIQUE Younger asking her, and that she either let me know, or let patient know directly.    Sindi Sal  Nneka-op Coordinator  [facility]  Direct Phone: [phone]

## 2022-05-04 ENCOUNTER — TELEPHONE (OUTPATIENT)
Dept: GASTROENTEROLOGY | Facility: CLINIC | Age: 67
End: 2022-05-04
Payer: COMMERCIAL

## 2022-05-04 NOTE — TELEPHONE ENCOUNTER
Screening Questions  BlueKIND OF PREP RedLOCATION [review exclusion criteria] GreenSEDATION TYPE  1. Have you had a positive covid test in the last 90 days? N     2. Do you have a legal guardian or medical Power of ?  Are you able to give consent for your medical care?Y (Sedation review/consideration needed)    3. Are you active on mychart? Y    4. What insurance is in the chart? BCBS MEDICARE      3.   Ordering/Referring Provider: JESSICA    4. BMI 27.1 [BMI OVER 40-EXTENDED PREP]  If greater than 40 review exclusion criteria [PAC APPT IF @ [facility]]        5.  Respiratory Screening :  [If yes to any of the following HOSPITAL setting only]     Do you use daily home oxygen? N    Do you have mod to severe Obstructive Sleep Apnea? N  [OKAY @ [facility] SH PH RI]   Do you have Pulmonary Hypertension? N     Do you have UNCONTROLLED asthma? N        6.   Have you had a heart or lung transplant? N      7.   Are you currently on dialysis? N [ If yes, G-PREP & HOSPITAL setting only]     8.   Do you have chronic kidney disease? N [ If yes, G-PREP ]    9.   Have you had a stroke or Transient ischemic attack (TIA - aka  mini stroke ) within 6 months?  N (If yes, please review exclusion criteria)    10.   In the past 6 months, have you had any heart related issues including cardiomyopathy or heart attack? N           If yes, did it require cardiac stenting or other implantable device? N      11.   Do you have any implantable devices in your body (pacemaker, defib, LVAD)? N (If yes, please review exclusion criteria)    12.   Do you take nitroglycerin? N           If yes, how often? N  (if yes, HOSPITAL setting ONLY)    13.   Are you currently taking any blood thinners? N           [IF YES, INFORM PATIENT TO FOLLOW UP W/ ORDERING PROVIDER FOR BRIDGING INSTRUCTIONS]     14.   Do you have a diagnosis of diabetes? Y   [ If yes, G-PREP ]    15.   [FEMALES] Are you currently pregnant? NA   If yes, how many weeks? NA    16.    Are you taking any prescription pain medications on a routine schedule?  N  [ If yes, EXTENDED PREP.] [If yes, MAC]    17.   Do you have any chemical dependencies such as alcohol, street drugs, or methadone?  N [If yes, MAC]    18.   Do you have any history of post-traumatic stress syndrome, severe anxiety or history of psychosis?  N  [If yes, MAC]    19.   Do you transfer independently?  Y    20.  On a regular basis do you go 3-5 days between bowel movements? N   [ If yes, EXTENDED PREP.]    21.   Preferred LOCAL Pharmacy for Pre Prescription   [id number] [address] AT [facility]      Scheduling Details      Caller : SHERYL   (Please ask for phone number if not scheduled by patient)    Type of Procedure Scheduled: COLON  Which Colonoscopy Prep was Sent?: SPLIT GO LUBNA KABA CF PATIENTS & GROEN'S PATIENTS NEEDS EXTENDED PREP  Surgeon: ARSLAN   Date of Procedure: 6/2  Location: [facility]      Sedation Type: MAC  Conscious Sedation- Needs  for 6 hours after the procedure  MAC/General-Needs  for 24 hours after procedure    Pre-op Required at [facility] [place], [place] and OR for MAC sedation: N  (advise patient they will need a pre-op prior to procedure -)      Informed patient they will need an adult  Y  Cannot take any type of public or medical transportation alone    Pre-Procedure Covid test to be completed at [facility] or Externally: Y    Confirmed Nurse will call to complete assessment Y    Additional comments: N

## 2022-05-20 ENCOUNTER — TELEPHONE (OUTPATIENT)
Dept: GASTROENTEROLOGY | Facility: CLINIC | Age: 67
End: 2022-05-20

## 2022-05-20 DIAGNOSIS — Z12.11 ENCOUNTER FOR SCREENING COLONOSCOPY: Primary | ICD-10-CM

## 2022-05-20 RX ORDER — BISACODYL 5 MG/1
TABLET, DELAYED RELEASE ORAL
Qty: 4 TABLET | Refills: 0 | Status: SHIPPED | OUTPATIENT
Start: 2022-05-20 | End: 2022-07-20

## 2022-05-20 NOTE — TELEPHONE ENCOUNTER
Patient scheduled for colonoscopy on 6.2.2022.     Covid test external?    Arrival time: 0720    Facility location: [facility]    Sedation type: MAC    Indication for procedure: screening colonoscopy    Bowel prep recommendation: Re d/t DM    Prep instructions sent via [employer].  Prep prescription sent to [id number] [place], [place] - [address] AT [facility]    Pre visit planning completed.    Clarice Arnold RN

## 2022-05-21 ENCOUNTER — MYC MEDICAL ADVICE (OUTPATIENT)
Dept: FAMILY MEDICINE | Facility: CLINIC | Age: 67
End: 2022-05-21
Payer: COMMERCIAL

## 2022-05-23 NOTE — TELEPHONE ENCOUNTER
Attempted to contact patient regarding upcoming colonoscopy procedure on 6.2.2022 for pre assessment questions. No answer.     Left message to return call to [phone] #[phone]    Covid test?    Clarice Arnold RN

## 2022-05-24 ENCOUNTER — TELEPHONE (OUTPATIENT)
Dept: GASTROENTEROLOGY | Facility: CLINIC | Age: 67
End: 2022-05-24
Payer: COMMERCIAL

## 2022-05-24 DIAGNOSIS — Z11.59 ENCOUNTER FOR SCREENING FOR OTHER VIRAL DISEASES: Primary | ICD-10-CM

## 2022-05-24 NOTE — TELEPHONE ENCOUNTER
Pre assessment questions completed for upcoming colonoscopy procedure scheduled on 6/2/22    COVID test scheduled? Call transferred to Covid test scheduling line to schedule once call was completed.     Reviewed procedural arrival time 0720 and facility location [facility].    Designated  policy reviewed. Instructed to have someone stay 24 hours post procedure.     Reviewed Gol[employer]ely prep instructions with patient. No nuts or seeds 7 days prior to procedure.     Patient verbalized understanding and had no questions or concerns at this time.    Marylin Raymond RN

## 2022-05-24 NOTE — TELEPHONE ENCOUNTER
Writer responded via [employer].    DIANDRA WestfallN, RN  [facility]

## 2022-05-24 NOTE — TELEPHONE ENCOUNTER
[facility] Call Center    Reason for Call: Other: Patient returning PA call     Action Taken: Patient transferred to: Valerie Raymond RN    Travel Screening: Not Applicable

## 2022-05-31 ENCOUNTER — LAB (OUTPATIENT)
Dept: LAB | Facility: CLINIC | Age: 67
End: 2022-05-31

## 2022-05-31 DIAGNOSIS — Z11.59 ENCOUNTER FOR SCREENING FOR OTHER VIRAL DISEASES: ICD-10-CM

## 2022-05-31 LAB — SARS-COV-2 RNA RESP QL NAA+PROBE: NEGATIVE

## 2022-05-31 PROCEDURE — U0003 INFECTIOUS AGENT DETECTION BY NUCLEIC ACID (DNA OR RNA); SEVERE ACUTE RESPIRATORY SYNDROME CORONAVIRUS 2 (SARS-COV-2) (CORONAVIRUS DISEASE [COVID-19]), AMPLIFIED PROBE TECHNIQUE, MAKING USE OF HIGH THROUGHPUT TECHNOLOGIES AS DESCRIBED BY CMS-2020-01-R: HCPCS | Performed by: INTERNAL MEDICINE

## 2022-06-02 ENCOUNTER — DOCUMENTATION ONLY (OUTPATIENT)
Dept: GASTROENTEROLOGY | Facility: OUTPATIENT CENTER | Age: 67
End: 2022-06-02
Payer: COMMERCIAL

## 2022-06-02 ENCOUNTER — TRANSFERRED RECORDS (OUTPATIENT)
Dept: HEALTH INFORMATION MANAGEMENT | Facility: CLINIC | Age: 67
End: 2022-06-02
Payer: COMMERCIAL

## 2022-06-02 DIAGNOSIS — Z12.11 ENCOUNTER FOR SCREENING COLONOSCOPY: ICD-10-CM

## 2022-06-12 DIAGNOSIS — E11.9 TYPE 2 DIABETES MELLITUS WITHOUT COMPLICATION, UNSPECIFIED WHETHER LONG TERM INSULIN USE (H): ICD-10-CM

## 2022-06-15 RX ORDER — METFORMIN HCL 500 MG
2000 TABLET, EXTENDED RELEASE 24 HR ORAL
Qty: 360 TABLET | Refills: 0 | Status: SHIPPED | OUTPATIENT
Start: 2022-06-15 | End: 2022-07-20

## 2022-06-15 NOTE — TELEPHONE ENCOUNTER
Prescription approved per [facility] Refill Protocol.  DIANDRA WestfallN, RN  [facility]

## 2022-07-19 ASSESSMENT — ENCOUNTER SYMPTOMS
PALPITATIONS: 0
DIARRHEA: 0
FEVER: 0
HEMATURIA: 0
DIZZINESS: 0
HEADACHES: 0
ABDOMINAL PAIN: 0
CHILLS: 0
DYSURIA: 0
NERVOUS/ANXIOUS: 0
PARESTHESIAS: 0
COUGH: 0
EYE PAIN: 0
WEAKNESS: 0
ARTHRALGIAS: 0
FREQUENCY: 0
SHORTNESS OF BREATH: 0
MYALGIAS: 0
SORE THROAT: 0
NAUSEA: 0
HEMATOCHEZIA: 0
CONSTIPATION: 0
JOINT SWELLING: 0
HEARTBURN: 0

## 2022-07-19 ASSESSMENT — ACTIVITIES OF DAILY LIVING (ADL): CURRENT_FUNCTION: NO ASSISTANCE NEEDED

## 2022-07-20 ENCOUNTER — OFFICE VISIT (OUTPATIENT)
Dept: FAMILY MEDICINE | Facility: CLINIC | Age: 67
End: 2022-07-20
Payer: COMMERCIAL

## 2022-07-20 VITALS
WEIGHT: 148 LBS | TEMPERATURE: 97.2 F | OXYGEN SATURATION: 98 % | DIASTOLIC BLOOD PRESSURE: 62 MMHG | SYSTOLIC BLOOD PRESSURE: 102 MMHG | HEART RATE: 78 BPM | RESPIRATION RATE: 16 BRPM | HEIGHT: 68 IN | BODY MASS INDEX: 22.43 KG/M2

## 2022-07-20 DIAGNOSIS — I10 ESSENTIAL HYPERTENSION: ICD-10-CM

## 2022-07-20 DIAGNOSIS — Z00.00 ENCOUNTER FOR MEDICARE ANNUAL WELLNESS EXAM: Primary | ICD-10-CM

## 2022-07-20 DIAGNOSIS — E11.9 TYPE 2 DIABETES MELLITUS WITHOUT COMPLICATION, UNSPECIFIED WHETHER LONG TERM INSULIN USE (H): ICD-10-CM

## 2022-07-20 DIAGNOSIS — C18.7 MALIGNANT NEOPLASM OF SIGMOID COLON (H): ICD-10-CM

## 2022-07-20 DIAGNOSIS — Z12.5 SCREENING PSA (PROSTATE SPECIFIC ANTIGEN): ICD-10-CM

## 2022-07-20 PROBLEM — D69.6 THROMBOCYTOPENIA, UNSPECIFIED (H): Status: RESOLVED | Noted: 2021-11-23 | Resolved: 2022-07-20

## 2022-07-20 LAB
ALBUMIN SERPL-MCNC: 4.3 G/DL (ref 3.4–5)
ALP SERPL-CCNC: 45 U/L (ref 40–150)
ALT SERPL W P-5'-P-CCNC: 41 U/L (ref 0–70)
ANION GAP SERPL CALCULATED.3IONS-SCNC: 8 MMOL/L (ref 3–14)
AST SERPL W P-5'-P-CCNC: 29 U/L (ref 0–45)
BILIRUB SERPL-MCNC: 0.8 MG/DL (ref 0.2–1.3)
BUN SERPL-MCNC: 16 MG/DL (ref 7–30)
CALCIUM SERPL-MCNC: 9.7 MG/DL (ref 8.5–10.1)
CHLORIDE BLD-SCNC: 105 MMOL/L (ref 94–109)
CHOLEST SERPL-MCNC: 100 MG/DL
CO2 SERPL-SCNC: 25 MMOL/L (ref 20–32)
CREAT SERPL-MCNC: 0.87 MG/DL (ref 0.66–1.25)
ERYTHROCYTE [DISTWIDTH] IN BLOOD BY AUTOMATED COUNT: 12.2 % (ref 10–15)
FASTING STATUS PATIENT QL REPORTED: YES
GFR SERPL CREATININE-BSD FRML MDRD: >90 ML/MIN/1.73M2
GLUCOSE BLD-MCNC: 126 MG/DL (ref 70–99)
HBA1C MFR BLD: 6.4 % (ref 0–5.6)
HCT VFR BLD AUTO: 45.4 % (ref 40–53)
HDLC SERPL-MCNC: 33 MG/DL
HGB BLD-MCNC: 15.5 G/DL (ref 13.3–17.7)
LDLC SERPL CALC-MCNC: 42 MG/DL
MCH RBC QN AUTO: 30.5 PG (ref 26.5–33)
MCHC RBC AUTO-ENTMCNC: 34.1 G/DL (ref 31.5–36.5)
MCV RBC AUTO: 89 FL (ref 78–100)
NONHDLC SERPL-MCNC: 67 MG/DL
PLATELET # BLD AUTO: 167 10E3/UL (ref 150–450)
POTASSIUM BLD-SCNC: 4.4 MMOL/L (ref 3.4–5.3)
PROT SERPL-MCNC: 7.6 G/DL (ref 6.8–8.8)
PSA SERPL-MCNC: 2.59 UG/L (ref 0–4)
RBC # BLD AUTO: 5.08 10E6/UL (ref 4.4–5.9)
SODIUM SERPL-SCNC: 138 MMOL/L (ref 133–144)
TRIGL SERPL-MCNC: 127 MG/DL
VIT B12 SERPL-MCNC: 393 PG/ML (ref 232–1245)
WBC # BLD AUTO: 11.6 10E3/UL (ref 4–11)

## 2022-07-20 PROCEDURE — 83036 HEMOGLOBIN GLYCOSYLATED A1C: CPT | Performed by: FAMILY MEDICINE

## 2022-07-20 PROCEDURE — 85027 COMPLETE CBC AUTOMATED: CPT | Performed by: FAMILY MEDICINE

## 2022-07-20 PROCEDURE — G0103 PSA SCREENING: HCPCS | Performed by: FAMILY MEDICINE

## 2022-07-20 PROCEDURE — 84443 ASSAY THYROID STIM HORMONE: CPT | Performed by: FAMILY MEDICINE

## 2022-07-20 PROCEDURE — 99207 PR FOOT EXAM NO CHARGE: CPT | Performed by: FAMILY MEDICINE

## 2022-07-20 PROCEDURE — 82607 VITAMIN B-12: CPT | Performed by: FAMILY MEDICINE

## 2022-07-20 PROCEDURE — 80061 LIPID PANEL: CPT | Performed by: FAMILY MEDICINE

## 2022-07-20 PROCEDURE — 80053 COMPREHEN METABOLIC PANEL: CPT | Performed by: FAMILY MEDICINE

## 2022-07-20 PROCEDURE — 36415 COLL VENOUS BLD VENIPUNCTURE: CPT | Performed by: FAMILY MEDICINE

## 2022-07-20 PROCEDURE — G0438 PPPS, INITIAL VISIT: HCPCS | Performed by: FAMILY MEDICINE

## 2022-07-20 PROCEDURE — 99214 OFFICE O/P EST MOD 30 MIN: CPT | Mod: 25 | Performed by: FAMILY MEDICINE

## 2022-07-20 RX ORDER — METFORMIN HCL 500 MG
2000 TABLET, EXTENDED RELEASE 24 HR ORAL
Qty: 360 TABLET | Refills: 1 | Status: SHIPPED | OUTPATIENT
Start: 2022-09-11 | End: 2023-03-13

## 2022-07-20 ASSESSMENT — ENCOUNTER SYMPTOMS
HEADACHES: 0
NERVOUS/ANXIOUS: 0
ABDOMINAL PAIN: 0
JOINT SWELLING: 0
CONSTIPATION: 0
SORE THROAT: 0
CHILLS: 0
ARTHRALGIAS: 0
DIARRHEA: 0
HEMATURIA: 0
FEVER: 0
FREQUENCY: 0
DYSURIA: 0
DIZZINESS: 0
COUGH: 0
HEMATOCHEZIA: 0
MYALGIAS: 0
SHORTNESS OF BREATH: 0
NAUSEA: 0
PALPITATIONS: 0
PARESTHESIAS: 0
WEAKNESS: 0
EYE PAIN: 0
HEARTBURN: 0

## 2022-07-20 ASSESSMENT — ACTIVITIES OF DAILY LIVING (ADL): CURRENT_FUNCTION: NO ASSISTANCE NEEDED

## 2022-07-20 NOTE — PROGRESS NOTES
"SUBJECTIVE:   Cipriano Parry is a 66 year old male who presents for Preventive Visit.    Patient has been advised of split billing requirements and indicates understanding: Yes  Are you in the first 12 months of your Medicare coverage?  No    Healthy Habits:     In general, how would you rate your overall health?  Good    Frequency of exercise:  2-3 days/week    Duration of exercise:  15-30 minutes    Do you usually eat at least 4 servings of fruit and vegetables a day, include whole grains    & fiber and avoid regularly eating high fat or \"junk\" foods?  Yes    Taking medications regularly:  Yes    Medication side effects:  Other    Ability to successfully perform activities of daily living:  No assistance needed    Home Safety:  No safety concerns identified    Hearing Impairment:  No hearing concerns    In the past 6 months, have you been bothered by leaking of urine?  No    In general, how would you rate your overall mental or emotional health?  Good      PHQ-2 Total Score: 0    Additional concerns today:  No    Do you feel safe in your environment? Yes    Have you ever done Advance Care Planning? (For example, a Health Directive, POLST, or a discussion with a medical provider or your loved ones about your wishes): No, advance care planning information given to patient to review.  Advanced care planning was discussed at today's visit.      Fall risk  Fallen 2 or more times in the past year?: No  Any fall with injury in the past year?: No    Cognitive Screening   1) Repeat 3 items (Leader, Season, Table)    2) Clock draw: NORMAL  3) 3 item recall: Recalls 3 objects  Results: 3 items recalled: COGNITIVE IMPAIRMENT LESS LIKELY    Mini-CogTM Copyright KEITH Iyer. Licensed by the author for use in [facility]; reprinted with permission ([email or web address] [email or web address]). All rights reserved.      Do you have sleep apnea, excessive snoring or daytime drowsiness?: no    Reviewed and updated as needed this visit by clinical " staff   Tobacco  Allergies  Meds   Med Hx  Surg Hx  Fam Hx  Soc Hx          Reviewed and updated as needed this visit by Provider                   Social History     Tobacco Use     Smoking status: Never Smoker     Smokeless tobacco: Never Used   Substance Use Topics     Alcohol use: Not Currently     Alcohol/week: 0.0 standard drinks     Comment: 1 beer on Weekends     If you drink alcohol do you typically have >3 drinks per day or >7 drinks per week? No     No flowsheet data found.        Diabetes Follow-up    How often are you checking your blood sugar? Two times daily  Blood sugar testing frequency justification:  Patient modifying lifestyle changes (diet, exercise) with blood sugars  What time of day are you checking your blood sugars (select all that apply)?  Before and after meals  Have you had any blood sugars above 200?  No  Have you had any blood sugars below 70?  No    What symptoms do you notice when your blood sugar is low?  None    What concerns do you have today about your diabetes? None     Do you have any of these symptoms? (Select all that apply)  No numbness or tingling in feet.  No redness, sores or blisters on feet.  No complaints of excessive thirst.  No reports of blurry vision.  No significant changes to weight.      BP Readings from Last 2 Encounters:   07/20/22 102/62   02/16/22 122/80     Hemoglobin A1C POCT (%)   Date Value   02/03/2021 7.4 (H)   07/13/2020 6.9 (H)     Hemoglobin A1C (%)   Date Value   07/20/2022 6.4 (H)   02/16/2022 7.5 (H)     LDL Cholesterol Calculated (mg/dL)   Date Value   11/19/2021 45   07/13/2020 38   03/22/2019 71            Current providers sharing in care for this patient include:   Patient Care Team:  Albino Venegas MD as PCP - General (Family Practice)  Meenakshi Blue MD as MD (Hematology & Oncology)  Sindi Viramontes, UNIQUE as Nurse Coordinator (Hematology & Oncology)  Albino Venegas MD as Assigned PCP    The following  "health maintenance items are reviewed in Epic and correct as of today:  Health Maintenance Due   Topic Date Due     BMP  07/13/2021     MICROALBUMIN  07/13/2021     7 weeks [facility]. Had a camper. Trip went well. Lots of hiking.    No change in health.     Father had prostate cancer.     Review of Systems   Constitutional: Negative for chills and fever.   HENT: Negative for congestion, ear pain, hearing loss and sore throat.    Eyes: Negative for pain and visual disturbance.   Respiratory: Negative for cough and shortness of breath.    Cardiovascular: Negative for chest pain, palpitations and peripheral edema.   Gastrointestinal: Negative for abdominal pain, constipation, diarrhea, heartburn, hematochezia and nausea.   Genitourinary: Negative for dysuria, frequency, genital sores, hematuria and urgency.   Musculoskeletal: Negative for arthralgias, joint swelling and myalgias.   Skin: Negative for rash.   Neurological: Negative for dizziness, weakness, headaches and paresthesias.   Psychiatric/Behavioral: Negative for mood changes. The patient is not nervous/anxious.      OBJECTIVE:   /62 (BP Location: Right arm, Patient Position: Sitting, Cuff Size: Adult Regular)   Pulse 78   Temp 97.2  F (36.2  C) (Temporal)   Resp 16   Ht 1.727 m (5' 8\")   Wt 67.1 kg (148 lb)   SpO2 98%   BMI 22.50 kg/m   Estimated body mass index is 22.5 kg/m  as calculated from the following:    Height as of this encounter: 1.727 m (5' 8\").    Weight as of this encounter: 67.1 kg (148 lb).  Physical Exam  GENERAL: healthy, alert and no distress  EYES: Eyes grossly normal to inspection, PERRL and conjunctivae and sclerae normal  HENT: ear canals and TM's normal, nose and mouth without ulcers or lesions  NECK: no adenopathy, no asymmetry, masses, or scars and thyroid normal to palpation  RESP: lungs clear to auscultation - no rales, rhonchi or wheezes  CV: regular rate and rhythm, normal S1 S2, no S3 or S4, no murmur, click or " rub, no peripheral edema and peripheral pulses strong  ABDOMEN: soft, nontender, no hepatosplenomegaly, no masses and bowel sounds normal  MS: no gross musculoskeletal defects noted, no edema  SKIN: no suspicious lesions or rashes  NEURO: Normal strength and tone, mentation intact and speech normal  PSYCH: mentation appears normal, affect normal/bright  Diabetic foot exam: , no trophic changes or ulcerative lesions and normal sensory exam, mild scaling of skin, nail dsytrophy right great toe and rest of the nails mild dystrophy    ASSESSMENT / PLAN:   (Z00.00) Encounter for Medicare annual wellness exam  (primary encounter diagnosis)  Comment:    Plan:      (E11.9) Type 2 diabetes mellitus without complication, unspecified whether long term insulin use (H)  Comment: Doing an excellent job.  Continue to work on lifestyle as he is doing.  If A1c goes below 6 cut back on metformin.  Plan: Lipid panel reflex to direct LDL Fasting,         Comprehensive metabolic panel, Albumin Random         Urine Quantitative with Creat Ratio, Hemoglobin        A1c, CBC with platelets, Vitamin B12, TSH with         free T4 reflex, FOOT EXAM, metFORMIN         (GLUCOPHAGE XR) 500 MG 24 hr tablet             (I10) Essential hypertension  Comment:    Plan: Blood pressure somewhat on lower side but we will stick to the same dose of Rx for now.  Consider cutting back on lisinopril dose in future.    (Z12.5) Screening PSA (prostate specific antigen)  Comment:    Plan: Prostate Specific Antigen Screen             (C18.7) Malignant neoplasm of sigmoid colon (H)  Comment:    Plan: Had resection in 2016.  Had a recent colonoscopy which was normal.  Further recommendations as per gastroenterology.          COUNSELING:  Reviewed preventive health counseling, as reflected in patient instructions  Special attention given to:       Regular exercise       Healthy diet/nutrition       Vision screening       Hearing screening       Dental care        "Bladder control       Prostate cancer screening    Estimated body mass index is 22.5 kg/m  as calculated from the following:    Height as of this encounter: 1.727 m (5' 8\").    Weight as of this encounter: 67.1 kg (148 lb).    Weight management plan noted, stable and monitoring    He reports that he has never smoked. He has never used smokeless tobacco.      Appropriate preventive services were discussed with this patient, including applicable screening as appropriate for cardiovascular disease, diabetes, osteopenia/osteoporosis, and glaucoma.  As appropriate for age/gender, discussed screening for colorectal cancer, prostate cancer, breast cancer, and cervical cancer. Checklist reviewing preventive services available has been given to the patient.    Reviewed patients plan of care and provided an AVS. The Basic Care Plan (routine screening as documented in Health Maintenance) for Cipriano meets the Care Plan requirement. This Care Plan has been established and reviewed with the Patient.    Counseling Resources:  ATP IV Guidelines  Pooled Cohorts Equation Calculator  Breast Cancer Risk Calculator  Breast Cancer: Medication to Reduce Risk  FRAX Risk Assessment  ICSI Preventive Guidelines  Dietary Guidelines for Americans, 2010  USDA's MyPlate  ASA Prophylaxis  Lung CA Screening    Albino Venegas MD  [facility]    Identified Health Risks:  "

## 2022-07-20 NOTE — PATIENT INSTRUCTIONS
Patient Education   Personalized Prevention Plan  You are due for the preventive services outlined below.  Your care team is available to assist you in scheduling these services.  If you have already completed any of these items, please share that information with your care team to update in your medical record.  Health Maintenance Due   Topic Date Due     Diabetic Foot Exam  11/18/2020     Basic Metabolic Panel  07/13/2021     Kidney Microalbumin Urine Test  07/13/2021     Annual Wellness Visit  02/03/2022

## 2022-07-21 NOTE — RESULT ENCOUNTER NOTE
Beatriz Cota,    Your test results fall within the expected range or remain unchanged from previous results.  Please continue with current treatment plan.    Let me know if you have any questions.  Best,  Dr. Estefany Mares MD/[facility] for Dr. Venegas who is out of office

## 2022-07-22 LAB — TSH SERPL DL<=0.005 MIU/L-ACNC: 2.36 MU/L (ref 0.4–4)

## 2022-09-11 ENCOUNTER — HEALTH MAINTENANCE LETTER (OUTPATIENT)
Age: 67
End: 2022-09-11

## 2022-10-01 ENCOUNTER — TRANSFERRED RECORDS (OUTPATIENT)
Dept: MULTI SPECIALTY CLINIC | Facility: CLINIC | Age: 67
End: 2022-10-01

## 2022-10-01 LAB — RETINOPATHY: NEGATIVE

## 2022-11-13 DIAGNOSIS — I10 ESSENTIAL HYPERTENSION: ICD-10-CM

## 2022-11-15 RX ORDER — LISINOPRIL 10 MG/1
TABLET ORAL
Qty: 90 TABLET | Refills: 2 | Status: SHIPPED | OUTPATIENT
Start: 2022-11-15 | End: 2023-08-16

## 2022-11-15 NOTE — TELEPHONE ENCOUNTER
---Prescription approved per [facility] Refill Protocol.       Livier Earl RN BSN     [facility]      --Last visit:  7/20/2022

## 2022-12-14 ENCOUNTER — DOCUMENTATION ONLY (OUTPATIENT)
Dept: OTHER | Facility: CLINIC | Age: 67
End: 2022-12-14

## 2022-12-15 DIAGNOSIS — E11.9 TYPE 2 DIABETES MELLITUS WITHOUT COMPLICATION, UNSPECIFIED WHETHER LONG TERM INSULIN USE (H): ICD-10-CM

## 2022-12-15 NOTE — TELEPHONE ENCOUNTER
Prescription approved per [facility] Refill Protocol.  MICHAEL Roche RN  [facility]

## 2023-01-22 ENCOUNTER — HEALTH MAINTENANCE LETTER (OUTPATIENT)
Age: 68
End: 2023-01-22

## 2023-01-30 DIAGNOSIS — E11.9 TYPE 2 DIABETES MELLITUS WITHOUT COMPLICATION, UNSPECIFIED WHETHER LONG TERM INSULIN USE (H): ICD-10-CM

## 2023-02-01 RX ORDER — SIMVASTATIN 10 MG
TABLET ORAL
Qty: 90 TABLET | Refills: 1 | Status: SHIPPED | OUTPATIENT
Start: 2023-02-01 | End: 2023-07-31

## 2023-02-01 NOTE — TELEPHONE ENCOUNTER
Prescription approved per [facility] Refill Protocol.  MICHAEL Roche RN  [facility]

## 2023-03-13 ENCOUNTER — OFFICE VISIT (OUTPATIENT)
Dept: FAMILY MEDICINE | Facility: CLINIC | Age: 68
End: 2023-03-13
Payer: COMMERCIAL

## 2023-03-13 VITALS
HEIGHT: 69 IN | BODY MASS INDEX: 22.51 KG/M2 | WEIGHT: 152 LBS | SYSTOLIC BLOOD PRESSURE: 132 MMHG | TEMPERATURE: 97.5 F | DIASTOLIC BLOOD PRESSURE: 85 MMHG | HEART RATE: 77 BPM | RESPIRATION RATE: 16 BRPM

## 2023-03-13 DIAGNOSIS — I10 ESSENTIAL HYPERTENSION: ICD-10-CM

## 2023-03-13 DIAGNOSIS — E11.9 TYPE 2 DIABETES MELLITUS WITHOUT COMPLICATION, WITHOUT LONG-TERM CURRENT USE OF INSULIN (H): Primary | ICD-10-CM

## 2023-03-13 DIAGNOSIS — C18.7 MALIGNANT NEOPLASM OF SIGMOID COLON (H): ICD-10-CM

## 2023-03-13 LAB
CREAT UR-MCNC: 53.8 MG/DL
HBA1C MFR BLD: 6.6 % (ref 0–5.6)
MICROALBUMIN UR-MCNC: <12 MG/L
MICROALBUMIN/CREAT UR: NORMAL MG/G{CREAT}

## 2023-03-13 PROCEDURE — 83036 HEMOGLOBIN GLYCOSYLATED A1C: CPT | Performed by: FAMILY MEDICINE

## 2023-03-13 PROCEDURE — 36415 COLL VENOUS BLD VENIPUNCTURE: CPT | Performed by: FAMILY MEDICINE

## 2023-03-13 PROCEDURE — 99214 OFFICE O/P EST MOD 30 MIN: CPT | Performed by: FAMILY MEDICINE

## 2023-03-13 PROCEDURE — 82043 UR ALBUMIN QUANTITATIVE: CPT | Performed by: FAMILY MEDICINE

## 2023-03-13 PROCEDURE — 82570 ASSAY OF URINE CREATININE: CPT | Performed by: FAMILY MEDICINE

## 2023-03-13 RX ORDER — METFORMIN HCL 500 MG
2000 TABLET, EXTENDED RELEASE 24 HR ORAL
Qty: 360 TABLET | Refills: 1 | Status: SHIPPED | OUTPATIENT
Start: 2023-03-13 | End: 2023-08-23

## 2023-03-13 NOTE — PROGRESS NOTES
Assessment & Plan     Type 2 diabetes mellitus without complication, without long-term current use of insulin (H)  Minimal worsening but overall doing really good job.  Continue with healthy lifestyle as he is already doing.  - Hemoglobin A1c; Future  - Albumin Random Urine Quantitative with Creat Ratio; Future  - Hemoglobin A1c  - Albumin Random Urine Quantitative with Creat Ratio  - REVIEW OF HEALTH MAINTENANCE PROTOCOL ORDERS  - metFORMIN (GLUCOPHAGE XR) 500 MG 24 hr tablet; Take 4 tablets (2,000 mg) by mouth daily (with dinner)  - blood glucose monitoring (NO BRAND SPECIFIED) meter device kit; Use to test blood sugar 1 times daily  Preferred blood glucose meter and supplies to accompany: [employer] meter or as ins prefers    Malignant neoplasm of sigmoid colon (H)  S/p resection.  In remission.    Essential hypertension  Blood pressure is under good control.  Stick to the same Rx.                   Return in about 6 months (around 9/13/2023).    Albino Venegas MD, MD  [facility]    Jomar Cota is a 67 year old, presenting for the following health issues:  Diabetes      History of Present Illness       Diabetes:   He presents for follow up of diabetes.  He is checking home blood glucose one time daily. He checks blood glucose before meals.  Blood glucose is never over 200 and never under 70. When his blood glucose is low, the patient is asymptomatic for confusion, blurred vision, lethargy and reports not feeling dizzy, shaky, or weak.  He has no concerns regarding his diabetes at this time.  He is not experiencing numbness or burning in feet, excessive thirst, blurry vision, weight changes or redness, sores or blisters on feet. The patient has had a diabetic eye exam in the last 12 months. Eye exam performed on 10/2022. Location of last eye exam [facility] [place], [place].        Hyperlipidemia:  He presents for follow up of hyperlipidemia.  He is taking  "medication to lower cholesterol. He is not having myalgia or other side effects to statin medications.    Hypertension: He presents for follow up of hypertension.  He does not check blood pressure  regularly outside of the clinic. Outpatient blood pressures have not been over 140/90. He does not follow a low salt diet. He consumes 0 sweetened beverage(s) daily. He exercises with enough effort to increase his heart rate 4 days per week.   He is taking medications regularly.     Spending time at [employer] and liking it.   Working out 3 days per week at [employer].   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   Helps out his wife after her back surgery.     No side effects overall. Sometime loose stool with metformin.     Oct 2022 eye exam - normal. [facility] in [place].     Last colonoscopy in June 2022. 5 yrs follow up.     [place]ravelling in June - [place], [place] and [place]. RV trip.       Review of Systems       Yes  Objective    /85 (BP Location: Right arm, Patient Position: Sitting, Cuff Size: Adult Regular)   Pulse 77   Temp 97.5  F (36.4  C) (Temporal)   Resp 16   Ht 1.74 m (5' 8.5\")   Wt 68.9 kg (152 lb)   BMI 22.77 kg/m    Body mass index is 22.77 kg/m .  Physical Exam                       "

## 2023-04-17 DIAGNOSIS — E11.9 TYPE 2 DIABETES MELLITUS WITHOUT COMPLICATION, WITHOUT LONG-TERM CURRENT USE OF INSULIN (H): ICD-10-CM

## 2023-04-19 RX ORDER — IBUPROFEN 100 MG/5ML
SUSPENSION ORAL
Qty: 1 KIT | Refills: 0 | Status: SHIPPED | OUTPATIENT
Start: 2023-04-19

## 2023-04-19 NOTE — TELEPHONE ENCOUNTER
---Prescription approved per [facility] Refill Protocol.       Livier Earl RN BSN     [facility]       Yes

## 2023-07-27 DIAGNOSIS — E11.9 TYPE 2 DIABETES MELLITUS WITHOUT COMPLICATION, UNSPECIFIED WHETHER LONG TERM INSULIN USE (H): ICD-10-CM

## 2023-07-28 NOTE — TELEPHONE ENCOUNTER
"Routing refill request to provider for review/approval because:  Labs not current:  ldl    Last Written Prescription Date:  2/1/23  Last Fill Quantity: 90,  # refills: 1   Last office visit provider:  3/13/23     Requested Prescriptions   Pending Prescriptions Disp Refills    simvastatin (ZOCOR) 10 MG tablet [Pharmacy Med Name: SIMVASTATIN 10 MG TABLET] 90 tablet 1     Sig: TAKE 1 TABLET BY MOUTH EVERYDAY AT BEDTIME       Statins Protocol Failed - 7/27/2023 12:22 AM        Failed - LDL on file in past 12 months     Recent Labs   Lab Test 07/20/22  0651   LDL 42             Passed - No abnormal creatine kinase in past 12 months     No lab results found.             Passed - Recent (12 mo) or future (30 days) visit within the authorizing provider's specialty     Patient has had an office visit with the authorizing provider or a provider within the authorizing providers department within the previous 12 mos or has a future within next 30 days. See \"Patient Info\" tab in inbasket, or \"Choose Columns\" in Meds & Orders section of the refill encounter.              Passed - Medication is active on med list        Passed - Patient is age 18 or older             Keily Carter, RN 07/28/23 6:50 PM  "

## 2023-07-31 RX ORDER — SIMVASTATIN 10 MG
TABLET ORAL
Qty: 90 TABLET | Refills: 1 | Status: SHIPPED | OUTPATIENT
Start: 2023-07-31 | End: 2023-08-23

## 2023-08-15 DIAGNOSIS — I10 ESSENTIAL HYPERTENSION: ICD-10-CM

## 2023-08-16 RX ORDER — LISINOPRIL 10 MG/1
TABLET ORAL
Qty: 90 TABLET | Refills: 2 | Status: SHIPPED | OUTPATIENT
Start: 2023-08-16 | End: 2024-04-08

## 2023-08-16 NOTE — TELEPHONE ENCOUNTER
Prescription approved per [facility] Refill Protocol.    Keily Grullon, BSN RN  [facility]

## 2023-08-21 ASSESSMENT — ENCOUNTER SYMPTOMS
EYE PAIN: 0
HEMATOCHEZIA: 0
WEAKNESS: 0
MYALGIAS: 0
CONSTIPATION: 0
ABDOMINAL PAIN: 0
PARESTHESIAS: 0
FEVER: 0
COUGH: 0
DIARRHEA: 0
DIZZINESS: 0
FREQUENCY: 0
PALPITATIONS: 0
SORE THROAT: 0
NAUSEA: 0
JOINT SWELLING: 0
HEADACHES: 0
HEARTBURN: 0
NERVOUS/ANXIOUS: 0
ARTHRALGIAS: 0
SHORTNESS OF BREATH: 0
DYSURIA: 0
CHILLS: 0
HEMATURIA: 0

## 2023-08-21 ASSESSMENT — ACTIVITIES OF DAILY LIVING (ADL): CURRENT_FUNCTION: NO ASSISTANCE NEEDED

## 2023-08-23 ENCOUNTER — OFFICE VISIT (OUTPATIENT)
Dept: FAMILY MEDICINE | Facility: CLINIC | Age: 68
End: 2023-08-23
Payer: COMMERCIAL

## 2023-08-23 ENCOUNTER — LAB (OUTPATIENT)
Dept: LAB | Facility: CLINIC | Age: 68
End: 2023-08-23
Payer: COMMERCIAL

## 2023-08-23 VITALS
BODY MASS INDEX: 22.79 KG/M2 | OXYGEN SATURATION: 98 % | WEIGHT: 150.4 LBS | RESPIRATION RATE: 18 BRPM | HEIGHT: 68 IN | SYSTOLIC BLOOD PRESSURE: 134 MMHG | TEMPERATURE: 99.2 F | DIASTOLIC BLOOD PRESSURE: 78 MMHG | HEART RATE: 80 BPM

## 2023-08-23 DIAGNOSIS — E11.9 TYPE 2 DIABETES MELLITUS WITHOUT COMPLICATION, WITHOUT LONG-TERM CURRENT USE OF INSULIN (H): ICD-10-CM

## 2023-08-23 DIAGNOSIS — Z00.00 ENCOUNTER FOR MEDICARE ANNUAL WELLNESS EXAM: Primary | ICD-10-CM

## 2023-08-23 DIAGNOSIS — E11.9 TYPE 2 DIABETES MELLITUS WITHOUT COMPLICATION, UNSPECIFIED WHETHER LONG TERM INSULIN USE (H): ICD-10-CM

## 2023-08-23 LAB
ALBUMIN SERPL BCG-MCNC: 4.5 G/DL (ref 3.5–5.2)
ALP SERPL-CCNC: 51 U/L (ref 40–129)
ALT SERPL W P-5'-P-CCNC: 48 U/L (ref 0–70)
ANION GAP SERPL CALCULATED.3IONS-SCNC: 12 MMOL/L (ref 7–15)
AST SERPL W P-5'-P-CCNC: 45 U/L (ref 0–45)
BILIRUB SERPL-MCNC: 0.4 MG/DL
BUN SERPL-MCNC: 10.1 MG/DL (ref 8–23)
CALCIUM SERPL-MCNC: 9.4 MG/DL (ref 8.8–10.2)
CHLORIDE SERPL-SCNC: 103 MMOL/L (ref 98–107)
CHOLEST SERPL-MCNC: 104 MG/DL
CREAT SERPL-MCNC: 0.9 MG/DL (ref 0.67–1.17)
CREAT UR-MCNC: 62.1 MG/DL
DEPRECATED HCO3 PLAS-SCNC: 24 MMOL/L (ref 22–29)
ERYTHROCYTE [DISTWIDTH] IN BLOOD BY AUTOMATED COUNT: 12.1 % (ref 10–15)
GFR SERPL CREATININE-BSD FRML MDRD: >90 ML/MIN/1.73M2
GLUCOSE SERPL-MCNC: 157 MG/DL (ref 70–99)
HBA1C MFR BLD: 7.1 % (ref 0–5.6)
HCT VFR BLD AUTO: 42.7 % (ref 40–53)
HDLC SERPL-MCNC: 28 MG/DL
HGB BLD-MCNC: 14.4 G/DL (ref 13.3–17.7)
LDLC SERPL CALC-MCNC: 49 MG/DL
MCH RBC QN AUTO: 29.9 PG (ref 26.5–33)
MCHC RBC AUTO-ENTMCNC: 33.7 G/DL (ref 31.5–36.5)
MCV RBC AUTO: 89 FL (ref 78–100)
MICROALBUMIN UR-MCNC: <12 MG/L
MICROALBUMIN/CREAT UR: NORMAL MG/G{CREAT}
NONHDLC SERPL-MCNC: 76 MG/DL
PLATELET # BLD AUTO: 153 10E3/UL (ref 150–450)
POTASSIUM SERPL-SCNC: 4.4 MMOL/L (ref 3.4–5.3)
PROT SERPL-MCNC: 7 G/DL (ref 6.4–8.3)
RBC # BLD AUTO: 4.82 10E6/UL (ref 4.4–5.9)
SODIUM SERPL-SCNC: 139 MMOL/L (ref 136–145)
TRIGL SERPL-MCNC: 137 MG/DL
TSH SERPL DL<=0.005 MIU/L-ACNC: 1.76 UIU/ML (ref 0.3–4.2)
VIT B12 SERPL-MCNC: 385 PG/ML (ref 232–1245)
WBC # BLD AUTO: 9.8 10E3/UL (ref 4–11)

## 2023-08-23 PROCEDURE — 82043 UR ALBUMIN QUANTITATIVE: CPT

## 2023-08-23 PROCEDURE — 84443 ASSAY THYROID STIM HORMONE: CPT

## 2023-08-23 PROCEDURE — 80061 LIPID PANEL: CPT

## 2023-08-23 PROCEDURE — 99214 OFFICE O/P EST MOD 30 MIN: CPT | Mod: 25 | Performed by: FAMILY MEDICINE

## 2023-08-23 PROCEDURE — 82570 ASSAY OF URINE CREATININE: CPT

## 2023-08-23 PROCEDURE — 90677 PCV20 VACCINE IM: CPT | Performed by: FAMILY MEDICINE

## 2023-08-23 PROCEDURE — 36415 COLL VENOUS BLD VENIPUNCTURE: CPT

## 2023-08-23 PROCEDURE — G0009 ADMIN PNEUMOCOCCAL VACCINE: HCPCS | Performed by: FAMILY MEDICINE

## 2023-08-23 PROCEDURE — 82607 VITAMIN B-12: CPT

## 2023-08-23 PROCEDURE — G0439 PPPS, SUBSEQ VISIT: HCPCS | Performed by: FAMILY MEDICINE

## 2023-08-23 PROCEDURE — 91312 COVID-19 BIVALENT 12+ (PFIZER): CPT | Performed by: FAMILY MEDICINE

## 2023-08-23 PROCEDURE — 83036 HEMOGLOBIN GLYCOSYLATED A1C: CPT

## 2023-08-23 PROCEDURE — 80053 COMPREHEN METABOLIC PANEL: CPT

## 2023-08-23 PROCEDURE — 0121A COVID-19 BIVALENT 12+ (PFIZER): CPT | Performed by: FAMILY MEDICINE

## 2023-08-23 PROCEDURE — 85027 COMPLETE CBC AUTOMATED: CPT

## 2023-08-23 RX ORDER — SIMVASTATIN 10 MG
10 TABLET ORAL AT BEDTIME
Qty: 90 TABLET | Refills: 1 | Status: SHIPPED | OUTPATIENT
Start: 2023-08-23 | End: 2024-04-08

## 2023-08-23 RX ORDER — METFORMIN HCL 500 MG
2000 TABLET, EXTENDED RELEASE 24 HR ORAL
Qty: 360 TABLET | Refills: 1 | Status: SHIPPED | OUTPATIENT
Start: 2023-08-23 | End: 2024-03-29

## 2023-08-23 ASSESSMENT — ENCOUNTER SYMPTOMS
PALPITATIONS: 0
WEAKNESS: 0
DIZZINESS: 0
SORE THROAT: 0
HEMATURIA: 0
JOINT SWELLING: 0
CHILLS: 0
FEVER: 0
CONSTIPATION: 0
COUGH: 0
PARESTHESIAS: 0
EYE PAIN: 0
MYALGIAS: 0
SHORTNESS OF BREATH: 0
ABDOMINAL PAIN: 0
HEARTBURN: 0
FREQUENCY: 0
NAUSEA: 0
HEADACHES: 0
ARTHRALGIAS: 0
DIARRHEA: 0
HEMATOCHEZIA: 0
DYSURIA: 0
NERVOUS/ANXIOUS: 0

## 2023-08-23 ASSESSMENT — ACTIVITIES OF DAILY LIVING (ADL): CURRENT_FUNCTION: NO ASSISTANCE NEEDED

## 2023-08-23 ASSESSMENT — PAIN SCALES - GENERAL: PAINLEVEL: NO PAIN (0)

## 2023-08-23 NOTE — PROGRESS NOTES
"SUBJECTIVE:   Cipriano is a 68 year old who presents for Preventive Visit.      8/23/2023     3:40 PM   Additional Questions   Roomed by CHANCE Navarro   Accompanied by self         8/23/2023     3:40 PM   Patient Reported Additional Medications   Patient reports taking the following new medications none     Are you in the first 12 months of your Medicare coverage?  No    Healthy Habits:     In general, how would you rate your overall health?  Good    Frequency of exercise:  2-3 days/week    Duration of exercise:  45-60 minutes    Do you usually eat at least 4 servings of fruit and vegetables a day, include whole grains    & fiber and avoid regularly eating high fat or \"junk\" foods?  Yes    Taking medications regularly:  Yes    Medication side effects:  None    Ability to successfully perform activities of daily living:  No assistance needed    Home Safety:  No safety concerns identified    Hearing Impairment:  No hearing concerns    In the past 6 months, have you been bothered by leaking of urine?  No    In general, how would you rate your overall mental or emotional health?  Good    Additional concerns today:  No    Have you ever done Advance Care Planning? (For example, a Health Directive, POLST, or a discussion with a medical provider or your loved ones about your wishes): Yes, patient states has an Advance Care Planning document and will bring a copy to the clinic.    Fall risk  Fallen 2 or more times in the past year?: No  Any fall with injury in the past year?: No    Cognitive Screening   1) Repeat 3 items (Leader, Season, Table)    2) Clock draw: NORMAL  3) 3 item recall: Recalls 3 objects  Results: 3 items recalled: COGNITIVE IMPAIRMENT LESS LIKELY    Mini-CogTM Copyright KEITH Iyer. Licensed by the author for use in [facility]; reprinted with permission ([email or web address]). All rights reserved.          Reviewed and updated as needed this visit by clinical staff   Tobacco  Allergies  Meds          "     Reviewed and updated as needed this visit by Provider                 Social History     Tobacco Use    Smoking status: Never    Smokeless tobacco: Never   Substance Use Topics    Alcohol use: Not Currently     Alcohol/week: 0.0 standard drinks of alcohol     Comment: 1 beer on Weekends             8/21/2023     6:26 AM   Alcohol Use   Prescreen: >3 drinks/day or >7 drinks/week? No          No data to display              Do you have a current opioid prescription? No  Do you use any other controlled substances or medications that are not prescribed by a provider? None             Current providers sharing in care for this patient include:    Patient Care Team:  Albino Venegas MD as PCP - General (Family Practice)  Meenakshi Blue MD as MD (Hematology & Oncology)  Sindi Viramontes, UNIQUE as Nurse Coordinator (Hematology & Oncology)  Albino Venegas MD as Assigned PCP    The following health maintenance items are reviewed in Epic and correct as of today:  Health Maintenance   Topic Date Due    COVID-19 Vaccine (5 - Additional dose for Ashley series) 02/12/2023    MEDICARE ANNUAL WELLNESS VISIT  07/20/2023    BMP  07/20/2023    LIPID  07/20/2023    DIABETIC FOOT EXAM  07/20/2023    Pneumococcal Vaccine: 65+ Years (2 - PCV) 05/05/2023    INFLUENZA VACCINE (1) 09/01/2023    EYE EXAM  10/01/2023    A1C  02/23/2024    MICROALBUMIN  03/13/2024    ANNUAL REVIEW OF HM ORDERS  03/13/2024    FALL RISK ASSESSMENT  08/23/2024    DTAP/TDAP/TD IMMUNIZATION (2 - Td or Tdap) 12/30/2025    COLORECTAL CANCER SCREENING  06/02/2027    ADVANCE CARE PLANNING  12/14/2027    HEPATITIS C SCREENING  Completed    PHQ-2 (once per calendar year)  Completed    ZOSTER IMMUNIZATION  Completed    AORTIC ANEURYSM SCREENING (SYSTEM ASSIGNED)  Completed    IPV IMMUNIZATION  Aged Out    MENINGITIS IMMUNIZATION  Aged Out     Was in [facility] and was not doing really well with his diet.  [place]  "camping coming up.   Will have eye exam in Nov.     Review of Systems   Constitutional:  Negative for chills and fever.   HENT:  Negative for congestion, ear pain, hearing loss and sore throat.    Eyes:  Negative for pain and visual disturbance.   Respiratory:  Negative for cough and shortness of breath.    Cardiovascular:  Negative for chest pain, palpitations and peripheral edema.   Gastrointestinal:  Negative for abdominal pain, constipation, diarrhea, heartburn, hematochezia and nausea.   Genitourinary:  Negative for dysuria, frequency, genital sores, hematuria, impotence, penile discharge and urgency.   Musculoskeletal:  Negative for arthralgias, joint swelling and myalgias.   Skin:  Negative for rash.   Neurological:  Negative for dizziness, weakness, headaches and paresthesias.   Psychiatric/Behavioral:  Negative for mood changes. The patient is not nervous/anxious.      OBJECTIVE:   /78 (BP Location: Right arm, Patient Position: Sitting, Cuff Size: Adult Regular)   Pulse 80   Temp 99.2  F (37.3  C) (Temporal)   Resp 18   Ht 1.715 m (5' 7.5\")   Wt 68.2 kg (150 lb 6.4 oz)   SpO2 98%   BMI 23.21 kg/m   Estimated body mass index is 23.21 kg/m  as calculated from the following:    Height as of this encounter: 1.715 m (5' 7.5\").    Weight as of this encounter: 68.2 kg (150 lb 6.4 oz).  Physical Exam  GENERAL: healthy, alert and no distress  EYES: Eyes grossly normal to inspection, PERRL and conjunctivae and sclerae normal  HENT: ear canals and TM's normal, nose and mouth without ulcers or lesions  NECK: no adenopathy, no asymmetry, masses, or scars and thyroid normal to palpation  RESP: lungs clear to auscultation - no rales, rhonchi or wheezes  CV: regular rate and rhythm, normal S1 S2, no S3 or S4, no murmur, click or rub, no peripheral edema and peripheral pulses strong  ABDOMEN: soft, nontender, no hepatosplenomegaly, no masses and bowel sounds normal  MS: no gross musculoskeletal defects noted, " no edema  SKIN: no suspicious lesions or rashes  NEURO: Normal strength and tone, mentation intact and speech normal  PSYCH: mentation appears normal, affect normal/bright  Diabetic foot exam: normal DP and PT pulses, no trophic changes or ulcerative lesions, normal sensory exam, tinea pedis, and onychomycosis big toes        ASSESSMENT / PLAN:   (Z00.00) Encounter for Medicare annual wellness exam  (primary encounter diagnosis)  Comment:    Plan:      (E11.9) Type 2 diabetes mellitus without complication, without long-term current use of insulin (H)  Comment: Mild worsening of A1c.  We will continue to watch his diet.  No change in his Rx.  Plan: metFORMIN (GLUCOPHAGE XR) 500 MG 24 hr tablet             COUNSELING:  Reviewed preventive health counseling, as reflected in patient instructions        He reports that he has never smoked. He has never used smokeless tobacco.      Appropriate preventive services were discussed with this patient, including applicable screening as appropriate for cardiovascular disease, diabetes, osteopenia/osteoporosis, and glaucoma.  As appropriate for age/gender, discussed screening for colorectal cancer, prostate cancer, breast cancer, and cervical cancer. Checklist reviewing preventive services available has been given to the patient.    Reviewed patients plan of care and provided an AVS. The Basic Care Plan (routine screening as documented in Health Maintenance) for Cipriano meets the Care Plan requirement. This Care Plan has been established and reviewed with the Patient.          Albino Venegas MD, MD  [facility]    Identified Health Risks:  I have reviewed Opioid Use Disorder and Substance Use Disorder risk factors and made any needed referrals.

## 2023-08-23 NOTE — PATIENT INSTRUCTIONS
Patient Education   Personalized Prevention Plan  You are due for the preventive services outlined below.  Your care team is available to assist you in scheduling these services.  If you have already completed any of these items, please share that information with your care team to update in your medical record.  Health Maintenance Due   Topic Date Due    COVID-19 Vaccine (5 - Additional dose for Ashley series) 02/12/2023    Annual Wellness Visit  07/20/2023    Basic Metabolic Panel  07/20/2023    Cholesterol Lab  07/20/2023    Diabetic Foot Exam  07/20/2023    Pneumococcal Vaccine (2 - PCV) 05/05/2023        Lotramin or any other antifungal medication for feet.

## 2023-12-28 ENCOUNTER — PATIENT OUTREACH (OUTPATIENT)
Dept: GASTROENTEROLOGY | Facility: CLINIC | Age: 68
End: 2023-12-28
Payer: COMMERCIAL

## 2024-02-20 DIAGNOSIS — E11.9 TYPE 2 DIABETES MELLITUS WITHOUT COMPLICATION, UNSPECIFIED WHETHER LONG TERM INSULIN USE (H): ICD-10-CM

## 2024-02-24 ENCOUNTER — HEALTH MAINTENANCE LETTER (OUTPATIENT)
Age: 69
End: 2024-02-24

## 2024-03-29 DIAGNOSIS — E11.9 TYPE 2 DIABETES MELLITUS WITHOUT COMPLICATION, WITHOUT LONG-TERM CURRENT USE OF INSULIN (H): ICD-10-CM

## 2024-03-29 RX ORDER — METFORMIN HCL 500 MG
2000 TABLET, EXTENDED RELEASE 24 HR ORAL
Qty: 360 TABLET | Refills: 1 | Status: SHIPPED | OUTPATIENT
Start: 2024-03-29 | End: 2024-04-08

## 2024-04-01 SDOH — HEALTH STABILITY: PHYSICAL HEALTH: ON AVERAGE, HOW MANY MINUTES DO YOU ENGAGE IN EXERCISE AT THIS LEVEL?: 60 MIN

## 2024-04-01 SDOH — HEALTH STABILITY: PHYSICAL HEALTH: ON AVERAGE, HOW MANY DAYS PER WEEK DO YOU ENGAGE IN MODERATE TO STRENUOUS EXERCISE (LIKE A BRISK WALK)?: 3 DAYS

## 2024-04-08 ENCOUNTER — OFFICE VISIT (OUTPATIENT)
Dept: FAMILY MEDICINE | Facility: CLINIC | Age: 69
End: 2024-04-08
Payer: COMMERCIAL

## 2024-04-08 VITALS
HEIGHT: 68 IN | WEIGHT: 154.9 LBS | HEART RATE: 70 BPM | RESPIRATION RATE: 16 BRPM | TEMPERATURE: 98.4 F | OXYGEN SATURATION: 98 % | DIASTOLIC BLOOD PRESSURE: 68 MMHG | SYSTOLIC BLOOD PRESSURE: 134 MMHG | BODY MASS INDEX: 23.48 KG/M2

## 2024-04-08 DIAGNOSIS — E11.9 TYPE 2 DIABETES MELLITUS WITHOUT COMPLICATION, WITHOUT LONG-TERM CURRENT USE OF INSULIN (H): Primary | ICD-10-CM

## 2024-04-08 DIAGNOSIS — I10 ESSENTIAL HYPERTENSION: ICD-10-CM

## 2024-04-08 LAB — HBA1C MFR BLD: 6.9 % (ref 0–5.6)

## 2024-04-08 PROCEDURE — 99214 OFFICE O/P EST MOD 30 MIN: CPT | Performed by: FAMILY MEDICINE

## 2024-04-08 PROCEDURE — 83036 HEMOGLOBIN GLYCOSYLATED A1C: CPT | Performed by: FAMILY MEDICINE

## 2024-04-08 PROCEDURE — 36415 COLL VENOUS BLD VENIPUNCTURE: CPT | Performed by: FAMILY MEDICINE

## 2024-04-08 RX ORDER — SIMVASTATIN 10 MG
10 TABLET ORAL AT BEDTIME
Qty: 90 TABLET | Refills: 1 | Status: SHIPPED | OUTPATIENT
Start: 2024-04-08

## 2024-04-08 RX ORDER — RESPIRATORY SYNCYTIAL VIRUS VACCINE 120MCG/0.5
0.5 KIT INTRAMUSCULAR ONCE
Qty: 1 EACH | Refills: 0 | Status: CANCELLED | OUTPATIENT
Start: 2024-04-08 | End: 2024-04-08

## 2024-04-08 RX ORDER — LISINOPRIL 10 MG/1
10 TABLET ORAL DAILY
Qty: 90 TABLET | Refills: 1 | Status: SHIPPED | OUTPATIENT
Start: 2024-04-08

## 2024-04-08 RX ORDER — METFORMIN HCL 500 MG
2000 TABLET, EXTENDED RELEASE 24 HR ORAL
Qty: 360 TABLET | Refills: 1 | Status: SHIPPED | OUTPATIENT
Start: 2024-04-08

## 2024-04-08 ASSESSMENT — PAIN SCALES - GENERAL: PAINLEVEL: NO PAIN (0)

## 2024-04-08 NOTE — PROGRESS NOTES
Preventive Care Visit  [facility] Sue Venegas MD, MD, Family Medicine  Apr 8, 2024      Assessment & Plan     Type 2 diabetes mellitus without complication, without long-term current use of insulin (H)  Patient is tolerating current medication without any major side effects of concerns and current dose seems reasonable too.  Current medication regime is effective. Continue current treatment without any changes.   - Hemoglobin A1c; Future  - Hemoglobin A1c  - metFORMIN (GLUCOPHAGE XR) 500 MG 24 hr tablet; Take 4 tablets (2,000 mg) by mouth daily (with dinner)  - simvastatin (ZOCOR) 10 MG tablet; Take 1 tablet (10 mg) by mouth at bedtime    Essential hypertension  Patient is tolerating current medication without any major side effects of concerns and current dose seems reasonable too.  Current medication regime is effective. Continue current treatment without any changes.   - lisinopril (ZESTRIL) 10 MG tablet; Take 1 tablet (10 mg) by mouth daily              Counseling  Appropriate preventive services were discussed with this patient, including applicable screening as appropriate for fall prevention, nutrition, physical activity, Tobacco-use cessation, weight loss and cognition.  Checklist reviewing preventive services available has been given to the patient.  Reviewed patient's diet, addressing concerns and/or questions.   He is at risk for lack of exercise and has been provided with information to increase physical activity for the benefit of his well-being.           Jomar Cota is a 68 year old, presenting for the following:  Annual Visit        4/8/2024     7:12 AM   Additional Questions   Roomed by Yumiko Bain   Accompanied by Self         Health Care Directive  Patient has a Health Care Directive on file  Advance care planning document is on file and is current.    HPI  Camping a lot. In [place] for 4 months.     Overall doing well.     Some diarrhea from  metformin - taking 2 pills bid and it is working better.     Planning to road trip around [place]. Wife had a back surgery.     Sep and Oct - gone for camping and will come back in Nov.     Seeing eye dcotor this Friday.         4/1/2024   General Health   How would you rate your overall physical health? Excellent   Feel stress (tense, anxious, or unable to sleep) Not at all         4/1/2024   Nutrition   Diet: Diabetic         4/1/2024   Exercise   Days per week of moderate/strenous exercise 3 days   Average minutes spent exercising at this level 60 min         4/1/2024   Social Factors   Worry food won't last until get money to buy more No   Food not last or not have enough money for food? No   Do you have housing?  Yes   Are you worried about losing your housing? No   Lack of transportation? No   Unable to get utilities (heat,electricity)? No         4/1/2024   Fall Risk   Fallen 2 or more times in the past year? No   Trouble with walking or balance? No          4/1/2024   Activities of Daily Living- Home Safety   Needs help with the following daily activites None of the above   Safety concerns in the home None of the above         4/1/2024   Dental   Dentist two times every year? (!) DECLINE         4/1/2024   Hearing Screening   Hearing concerns? None of the above         4/1/2024   Driving Risk Screening   Patient/family members have concerns about driving No         4/1/2024   General Alertness/Fatigue Screening   Have you been more tired than usual lately? No         4/1/2024   Urinary Incontinence Screening   Bothered by leaking urine in past 6 months No         4/1/2024   TB Screening   Were you born outside of the [place]? No         Today's PHQ-2 Score:       4/8/2024     6:01 AM   PHQ-2 ( 1999 [employer])   Q1: Little interest or pleasure in doing things 0   Q2: Feeling down, depressed or hopeless 0   PHQ-2 Score 0   Q1: Little interest or pleasure in doing things Not at all   Q2: Feeling down, depressed or  hopeless Not at all   PHQ-2 Score 0           4/1/2024   Substance Use   Alcohol more than 3/day or more than 7/wk No   Do you have a current opioid prescription? No   How severe/bad is pain from 1 to 10? 0/10 (No Pain)   Do you use any other substances recreationally? (!) ALCOHOL     Social History     Tobacco Use    Smoking status: Never    Smokeless tobacco: Never   Vaping Use    Vaping Use: Never used   Substance Use Topics    Alcohol use: Not Currently     Alcohol/week: 0.0 standard drinks of alcohol     Comment: 1 beer on Weekends    Drug use: No           4/1/2024   AAA Screening   Family history of Abdominal Aortic Aneurysm (AAA)? No   Last PSA:   PSA   Date Value Ref Range Status   02/03/2021 3.12 0 - 4 ug/L Final     Comment:     Assay Method:  Chemiluminescence using Siemens Vista analyzer     Prostate Specific Antigen Screen   Date Value Ref Range Status   07/20/2022 2.59 0.00 - 4.00 ug/L Final     ASCVD Risk   The ASCVD Risk score (Harry ALVARES, et al., 2019) failed to calculate for the following reasons:    The valid total cholesterol range is 130 to 320 mg/dL            Reviewed and updated as needed this visit by Provider    Current providers sharing in care for this patient include:  Patient Care Team:  Albino Venegas MD as PCP - General (Family Medicine)  Meenakshi Blue MD as MD (Hematology & Oncology)  Sindi Viramontes, UNIQUE as Nurse Coordinator (Hematology & Oncology)  Albino Venegas MD as Assigned PCP    The following health maintenance items are reviewed in Epic and correct as of today:  Health Maintenance   Topic Date Due    RSV VACCINE (Pregnancy & 60+) (1 - 1-dose 60+ series) Never done    INFLUENZA VACCINE (1) 09/01/2023    EYE EXAM  10/01/2023    COVID-19 Vaccine (6 - 2023-24 season) 10/18/2023    ANNUAL REVIEW OF HM ORDERS  03/13/2024    MEDICARE ANNUAL WELLNESS VISIT  08/23/2024    BMP  08/23/2024    LIPID  08/23/2024    MICROALBUMIN  08/23/2024     "DIABETIC FOOT EXAM  08/23/2024    A1C  10/08/2024    FALL RISK ASSESSMENT  04/08/2025    DTAP/TDAP/TD IMMUNIZATION (2 - Td or Tdap) 12/30/2025    COLORECTAL CANCER SCREENING  06/02/2027    ADVANCE CARE PLANNING  08/24/2028    HEPATITIS C SCREENING  Completed    PHQ-2 (once per calendar year)  Completed    Pneumococcal Vaccine: 65+ Years  Completed    ZOSTER IMMUNIZATION  Completed    IPV IMMUNIZATION  Aged Out    HPV IMMUNIZATION  Aged Out    MENINGITIS IMMUNIZATION  Aged Out    RSV MONOCLONAL ANTIBODY  Aged Out            Objective    Exam  BP (!) 150/84 (BP Location: Right arm, Patient Position: Sitting, Cuff Size: Adult Regular)   Pulse 70   Temp 98.4  F (36.9  C) (Oral)   Resp 16   Ht 1.722 m (5' 7.8\")   Wt 70.3 kg (154 lb 14.4 oz)   SpO2 98%   BMI 23.69 kg/m     Estimated body mass index is 23.69 kg/m  as calculated from the following:    Height as of this encounter: 1.722 m (5' 7.8\").    Weight as of this encounter: 70.3 kg (154 lb 14.4 oz).    Physical Exam          4/8/2024   Mini Cog   Clock Draw Score 2 Normal   3 Item Recall 3 objects recalled   Mini Cog Total Score 5          Signed Electronically by: Albino Venegas MD    "

## 2024-04-12 ENCOUNTER — TRANSFERRED RECORDS (OUTPATIENT)
Dept: MULTI SPECIALTY CLINIC | Facility: CLINIC | Age: 69
End: 2024-04-12

## 2024-04-12 LAB — RETINOPATHY: NORMAL

## 2024-04-30 ENCOUNTER — TRANSFERRED RECORDS (OUTPATIENT)
Dept: HEALTH INFORMATION MANAGEMENT | Facility: CLINIC | Age: 69
End: 2024-04-30
Payer: COMMERCIAL

## 2024-07-24 ENCOUNTER — PATIENT OUTREACH (OUTPATIENT)
Dept: CARE COORDINATION | Facility: CLINIC | Age: 69
End: 2024-07-24
Payer: COMMERCIAL

## 2024-08-07 ENCOUNTER — PATIENT OUTREACH (OUTPATIENT)
Dept: CARE COORDINATION | Facility: CLINIC | Age: 69
End: 2024-08-07
Payer: COMMERCIAL

## 2024-10-21 SDOH — HEALTH STABILITY: PHYSICAL HEALTH: ON AVERAGE, HOW MANY DAYS PER WEEK DO YOU ENGAGE IN MODERATE TO STRENUOUS EXERCISE (LIKE A BRISK WALK)?: 3 DAYS

## 2024-10-21 SDOH — HEALTH STABILITY: PHYSICAL HEALTH: ON AVERAGE, HOW MANY MINUTES DO YOU ENGAGE IN EXERCISE AT THIS LEVEL?: 30 MIN

## 2024-10-21 ASSESSMENT — SOCIAL DETERMINANTS OF HEALTH (SDOH): HOW OFTEN DO YOU GET TOGETHER WITH FRIENDS OR RELATIVES?: ONCE A WEEK

## 2024-10-22 ENCOUNTER — OFFICE VISIT (OUTPATIENT)
Dept: FAMILY MEDICINE | Facility: CLINIC | Age: 69
End: 2024-10-22
Attending: FAMILY MEDICINE
Payer: COMMERCIAL

## 2024-10-22 VITALS
BODY MASS INDEX: 23.4 KG/M2 | TEMPERATURE: 97.3 F | HEART RATE: 73 BPM | HEIGHT: 68 IN | OXYGEN SATURATION: 98 % | WEIGHT: 154.4 LBS | DIASTOLIC BLOOD PRESSURE: 74 MMHG | SYSTOLIC BLOOD PRESSURE: 138 MMHG | RESPIRATION RATE: 14 BRPM

## 2024-10-22 DIAGNOSIS — C18.7 MALIGNANT NEOPLASM OF SIGMOID COLON (H): ICD-10-CM

## 2024-10-22 DIAGNOSIS — E11.9 TYPE 2 DIABETES MELLITUS WITHOUT COMPLICATION, WITHOUT LONG-TERM CURRENT USE OF INSULIN (H): ICD-10-CM

## 2024-10-22 DIAGNOSIS — Z00.00 ENCOUNTER FOR MEDICARE ANNUAL WELLNESS EXAM: Primary | ICD-10-CM

## 2024-10-22 DIAGNOSIS — I10 ESSENTIAL HYPERTENSION: ICD-10-CM

## 2024-10-22 LAB
ALBUMIN SERPL BCG-MCNC: 4.6 G/DL (ref 3.5–5.2)
ALP SERPL-CCNC: 53 U/L (ref 40–150)
ALT SERPL W P-5'-P-CCNC: 53 U/L (ref 0–70)
ANION GAP SERPL CALCULATED.3IONS-SCNC: 13 MMOL/L (ref 7–15)
AST SERPL W P-5'-P-CCNC: 48 U/L (ref 0–45)
BILIRUB SERPL-MCNC: 0.6 MG/DL
BUN SERPL-MCNC: 15.4 MG/DL (ref 8–23)
CALCIUM SERPL-MCNC: 9.5 MG/DL (ref 8.8–10.4)
CHLORIDE SERPL-SCNC: 100 MMOL/L (ref 98–107)
CHOLEST SERPL-MCNC: 105 MG/DL
CREAT SERPL-MCNC: 1.06 MG/DL (ref 0.67–1.17)
CREAT UR-MCNC: 117 MG/DL
EGFRCR SERPLBLD CKD-EPI 2021: 76 ML/MIN/1.73M2
ERYTHROCYTE [DISTWIDTH] IN BLOOD BY AUTOMATED COUNT: 11.9 % (ref 10–15)
EST. AVERAGE GLUCOSE BLD GHB EST-MCNC: 154 MG/DL
FASTING STATUS PATIENT QL REPORTED: YES
FASTING STATUS PATIENT QL REPORTED: YES
GLUCOSE SERPL-MCNC: 127 MG/DL (ref 70–99)
HBA1C MFR BLD: 7 % (ref 0–5.6)
HCO3 SERPL-SCNC: 24 MMOL/L (ref 22–29)
HCT VFR BLD AUTO: 43.8 % (ref 40–53)
HDLC SERPL-MCNC: 28 MG/DL
HGB BLD-MCNC: 14.6 G/DL (ref 13.3–17.7)
LDLC SERPL CALC-MCNC: 46 MG/DL
MCH RBC QN AUTO: 30.4 PG (ref 26.5–33)
MCHC RBC AUTO-ENTMCNC: 33.3 G/DL (ref 31.5–36.5)
MCV RBC AUTO: 91 FL (ref 78–100)
MICROALBUMIN UR-MCNC: <12 MG/L
MICROALBUMIN/CREAT UR: NORMAL MG/G{CREAT}
NONHDLC SERPL-MCNC: 77 MG/DL
PLATELET # BLD AUTO: 167 10E3/UL (ref 150–450)
POTASSIUM SERPL-SCNC: 4.3 MMOL/L (ref 3.4–5.3)
PROT SERPL-MCNC: 7.3 G/DL (ref 6.4–8.3)
RBC # BLD AUTO: 4.8 10E6/UL (ref 4.4–5.9)
SODIUM SERPL-SCNC: 137 MMOL/L (ref 135–145)
TRIGL SERPL-MCNC: 154 MG/DL
TSH SERPL DL<=0.005 MIU/L-ACNC: 3.25 UIU/ML (ref 0.3–4.2)
VIT B12 SERPL-MCNC: 446 PG/ML (ref 232–1245)
WBC # BLD AUTO: 12.3 10E3/UL (ref 4–11)

## 2024-10-22 PROCEDURE — 82570 ASSAY OF URINE CREATININE: CPT | Performed by: FAMILY MEDICINE

## 2024-10-22 PROCEDURE — 90480 ADMN SARSCOV2 VAC 1/ONLY CMP: CPT | Performed by: FAMILY MEDICINE

## 2024-10-22 PROCEDURE — 83036 HEMOGLOBIN GLYCOSYLATED A1C: CPT | Performed by: FAMILY MEDICINE

## 2024-10-22 PROCEDURE — 80061 LIPID PANEL: CPT | Performed by: FAMILY MEDICINE

## 2024-10-22 PROCEDURE — G0008 ADMIN INFLUENZA VIRUS VAC: HCPCS | Performed by: FAMILY MEDICINE

## 2024-10-22 PROCEDURE — 85027 COMPLETE CBC AUTOMATED: CPT | Performed by: FAMILY MEDICINE

## 2024-10-22 PROCEDURE — 80053 COMPREHEN METABOLIC PANEL: CPT | Performed by: FAMILY MEDICINE

## 2024-10-22 PROCEDURE — 82607 VITAMIN B-12: CPT | Performed by: FAMILY MEDICINE

## 2024-10-22 PROCEDURE — 82043 UR ALBUMIN QUANTITATIVE: CPT | Performed by: FAMILY MEDICINE

## 2024-10-22 PROCEDURE — 91320 SARSCV2 VAC 30MCG TRS-SUC IM: CPT | Performed by: FAMILY MEDICINE

## 2024-10-22 PROCEDURE — 84443 ASSAY THYROID STIM HORMONE: CPT | Performed by: FAMILY MEDICINE

## 2024-10-22 PROCEDURE — 36415 COLL VENOUS BLD VENIPUNCTURE: CPT | Performed by: FAMILY MEDICINE

## 2024-10-22 PROCEDURE — 90662 IIV NO PRSV INCREASED AG IM: CPT | Performed by: FAMILY MEDICINE

## 2024-10-22 PROCEDURE — 99214 OFFICE O/P EST MOD 30 MIN: CPT | Mod: 25 | Performed by: FAMILY MEDICINE

## 2024-10-22 PROCEDURE — G0439 PPPS, SUBSEQ VISIT: HCPCS | Performed by: FAMILY MEDICINE

## 2024-10-22 RX ORDER — LISINOPRIL 10 MG/1
10 TABLET ORAL DAILY
Qty: 90 TABLET | Refills: 1 | Status: SHIPPED | OUTPATIENT
Start: 2024-10-22

## 2024-10-22 RX ORDER — SIMVASTATIN 10 MG
10 TABLET ORAL AT BEDTIME
Qty: 90 TABLET | Refills: 1 | Status: SHIPPED | OUTPATIENT
Start: 2024-10-22

## 2024-10-22 RX ORDER — METFORMIN HYDROCHLORIDE 500 MG/1
2000 TABLET, EXTENDED RELEASE ORAL
Qty: 360 TABLET | Refills: 1 | Status: SHIPPED | OUTPATIENT
Start: 2024-10-22

## 2024-10-22 ASSESSMENT — PAIN SCALES - GENERAL: PAINLEVEL: NO PAIN (0)

## 2024-10-22 NOTE — PATIENT INSTRUCTIONS
Patient Education   Preventive Care Advice   This is general advice given by our system to help you stay healthy. However, your care team may have specific advice just for you. Please talk to your care team about your preventive care needs.  Nutrition  Eat 5 or more servings of fruits and vegetables each day.  Try wheat bread, brown rice and whole grain pasta (instead of white bread, rice, and pasta).  Get enough calcium and vitamin D. Check the label on foods and aim for 100% of the RDA (recommended daily allowance).  Lifestyle  Exercise at least 150 minutes each week  (30 minutes a day, 5 days a week).  Do muscle strengthening activities 2 days a week. These help control your weight and prevent disease.  No smoking.  Wear sunscreen to prevent skin cancer.  Have a dental exam and cleaning every 6 months.  Yearly exams  See your health care team every year to talk about:  Any changes in your health.  Any medicines your care team has prescribed.  Preventive care, family planning, and ways to prevent chronic diseases.  Shots (vaccines)   HPV shots (up to age 26), if you've never had them before.  Hepatitis B shots (up to age 59), if you've never had them before.  COVID-19 shot: Get this shot when it's due.  Flu shot: Get a flu shot every year.  Tetanus shot: Get a tetanus shot every 10 years.  Pneumococcal, hepatitis A, and RSV shots: Ask your care team if you need these based on your risk.  Shingles shot (for age 50 and up)  General health tests  Diabetes screening:  Starting at age 35, Get screened for diabetes at least every 3 years.  If you are younger than age 35, ask your care team if you should be screened for diabetes.  Cholesterol test: At age 39, start having a cholesterol test every 5 years, or more often if advised.  Bone density scan (DEXA): At age 50, ask your care team if you should have this scan for osteoporosis (brittle bones).  Hepatitis C: Get tested at least once in your life.  STIs (sexually  transmitted infections)  Before age 24: Ask your care team if you should be screened for STIs.  After age 24: Get screened for STIs if you're at risk. You are at risk for STIs (including HIV) if:  You are sexually active with more than one person.  You don't use condoms every time.  You or a partner was diagnosed with a sexually transmitted infection.  If you are at risk for HIV, ask about PrEP medicine to prevent HIV.  Get tested for HIV at least once in your life, whether you are at risk for HIV or not.  Cancer screening tests  Cervical cancer screening: If you have a cervix, begin getting regular cervical cancer screening tests starting at age 21.  Breast cancer scan (mammogram): If you've ever had breasts, begin having regular mammograms starting at age 40. This is a scan to check for breast cancer.  Colon cancer screening: It is important to start screening for colon cancer at age 45.  Have a colonoscopy test every 10 years (or more often if you're at risk) Or, ask your provider about stool tests like a FIT test every year or Cologuard test every 3 years.  To learn more about your testing options, visit:   .  For help making a decision, visit:   https://bit.ly/ke78937.  Prostate cancer screening test: If you have a prostate, ask your care team if a prostate cancer screening test (PSA) at age 55 is right for you.  Lung cancer screening: If you are a current or former smoker ages 50 to 80, ask your care team if ongoing lung cancer screenings are right for you.  For informational purposes only. Not to replace the advice of your health care provider. Copyright   2023 [facility] [employer]. All rights reserved. Clinically reviewed by the [facility] Transitions Program. [employer] 428519 - REV 01/24.  Hearing Loss: Care Instructions  Overview     Hearing loss is a sudden or slow decrease in how well you hear. It can range from slight to profound. Permanent hearing loss can occur with aging. It also can  happen when you are exposed long-term to loud noise. Examples include listening to loud music, riding motorcycles, or being around other loud machines.  Hearing loss can affect your work and home life. It can make you feel lonely or depressed. You may feel that you have lost your independence. But hearing aids and other devices can help you hear better and feel connected to others.  Follow-up care is a key part of your treatment and safety. Be sure to make and go to all appointments, and call your doctor if you are having problems. It's also a good idea to know your test results and keep a list of the medicines you take.  How can you care for yourself at home?  Avoid loud noises whenever possible. This helps keep your hearing from getting worse.  Always wear hearing protection around loud noises.  Wear a hearing aid as directed.  A professional can help you pick a hearing aid that will work best for you.  You can also get hearing aids over the counter for mild to moderate hearing loss.  Have hearing tests as your doctor suggests. They can show whether your hearing has changed. Your hearing aid may need to be adjusted.  Use other devices as needed. These may include:  Telephone amplifiers and hearing aids that can connect to a television, stereo, radio, or microphone.  Devices that use lights or vibrations. These alert you to the doorbell, a ringing telephone, or a baby monitor.  Television closed-captioning. This shows the words at the bottom of the screen. Most new TVs can do this.  TTY (text telephone). This lets you type messages back and forth on the telephone instead of talking or listening. These devices are also called TDD. When messages are typed on the keyboard, they are sent over the phone line to a receiving TTY. The message is shown on a monitor.  Use text messaging, social media, and email if it is hard for you to communicate by telephone.  Try to learn a listening technique called speechreading. It is  "not lipreading. You pay attention to people's gestures, expressions, posture, and tone of voice. These clues can help you understand what a person is saying. Face the person you are talking to, and have them face you. Make sure the lighting is good. You need to see the other person's face clearly.  Think about counseling if you need help to adjust to your hearing loss.  When should you call for help?  Watch closely for changes in your health, and be sure to contact your doctor if:    You think your hearing is getting worse.     You have new symptoms, such as dizziness or nausea.   Where can you learn more?  Go to https://www.RigUp.net/patiented  Enter R798 in the search box to learn more about \"Hearing Loss: Care Instructions.\"  Current as of: September 27, 2023  Content Version: 14.2 2024 [facility] [employer].   Care instructions adapted under license by your healthcare professional. If you have questions about a medical condition or this instruction, always ask your healthcare professional. [employer] disclaims any warranty or liability for your use of this information.       "

## 2024-10-22 NOTE — PROGRESS NOTES
Preventive Care Visit  [facility]  Albino Sue Venegas MD, MD, Family Medicine  Oct 22, 2024      Assessment & Plan     Encounter for Medicare annual wellness exam       Type 2 diabetes mellitus without complication, without long-term current use of insulin (H)  Patient is tolerating current medication without any major side effects of concerns and current dose seems reasonable too.  Current medication regime is effective. Continue current treatment without any changes.   - Hemoglobin A1c; Future  - Lipid panel reflex to direct LDL Fasting; Future  - Comprehensive metabolic panel; Future  - Albumin Random Urine Quantitative with Creat Ratio; Future  - CBC with platelets; Future  - Vitamin B12; Future  - TSH with free T4 reflex; Future  - Hemoglobin A1c  - Lipid panel reflex to direct LDL Fasting  - Comprehensive metabolic panel  - Albumin Random Urine Quantitative with Creat Ratio  - CBC with platelets  - Vitamin B12  - TSH with free T4 reflex  - metFORMIN (GLUCOPHAGE XR) 500 MG 24 hr tablet; Take 4 tablets (2,000 mg) by mouth daily (with dinner).  - simvastatin (ZOCOR) 10 MG tablet; Take 1 tablet (10 mg) by mouth at bedtime.  - blood glucose (CONTOUR NEXT TEST) test strip; USE TO TEST BLOOD SUGAR 1 TIMES DAILY OR AS DIRECTED. ASCENIA METER OR PER INSURANCE    Essential hypertension  Patient is tolerating current medication without any major side effects of concerns and current dose seems reasonable too.  Current medication regime is effective. Continue current treatment without any changes.   - lisinopril (ZESTRIL) 10 MG tablet; Take 1 tablet (10 mg) by mouth daily.     Malignant neoplasm of sigmoid colon  In remission. S/p resection.     RSV through the pharmacy.     Counseling  Appropriate preventive services were addressed with this patient via screening, questionnaire, or discussion as appropriate for fall prevention, nutrition, physical activity, Tobacco-use cessation, social  engagement, weight loss and cognition.  Checklist reviewing preventive services available has been given to the patient.  Reviewed patient's diet, addressing concerns and/or questions.   He is at risk for lack of exercise and has been provided with information to increase physical activity for the benefit of his well-being.   He is at risk for psychosocial distress and has been provided with information to reduce risk.   The patient was provided with written information regarding signs of hearing loss.           Jomar Cota is a 69 year old, presenting for the following:  Annual Visit        10/22/2024     7:08 AM   Additional Questions   Roomed by Yumiko perez   Accompanied by Self         Health Care Directive  Patient has a Health Care Directive on file  Advance care planning document is on file and is current.    HPI    Wifes mobility went downhill.   Had last big trip - [id number] miles around [facility] [place]. [facility].   Eye exam Nov 2024.         10/21/2024   General Health   How would you rate your overall physical health? Good   Feel stress (tense, anxious, or unable to sleep) Only a little      (!) STRESS CONCERN      10/21/2024   Nutrition   Diet: Diabetic            10/21/2024   Exercise   Days per week of moderate/strenous exercise 3 days   Average minutes spent exercising at this level 30 min            10/21/2024   Social Factors   Frequency of gathering with friends or relatives Once a week   Worry food won't last until get money to buy more No   Food not last or not have enough money for food? No   Do you have housing? (Housing is defined as stable permanent housing and does not include staying ouside in a car, in a tent, in an abandoned building, in an overnight shelter, or couch-surfing.) Yes   Are you worried about losing your housing? No   Lack of transportation? No   Unable to get utilities (heat,electricity)? No            10/21/2024   Fall Risk   Fallen 2 or more times in the past  year? No   Trouble with walking or balance? No             10/21/2024   Activities of Daily Living- Home Safety   Needs help with the following daily activites None of the above   Safety concerns in the home None of the above            10/21/2024   Dental   Dentist two times every year? (!) DECLINE            10/21/2024   Hearing Screening   Hearing concerns? (!) TROUBLE UNDERSTANDING SOFT OR WHISPERED SPEECH.            10/21/2024   Driving Risk Screening   Patient/family members have concerns about driving No            10/21/2024   General Alertness/Fatigue Screening   Have you been more tired than usual lately? No            10/21/2024   Urinary Incontinence Screening   Bothered by leaking urine in past 6 months No            4/1/2024   TB Screening   Were you born outside of the [place]? No      Today's PHQ-2 Score:       10/21/2024     9:36 PM   PHQ-2 ( 1999 Pfizer)   Q1: Little interest or pleasure in doing things 0   Q2: Feeling down, depressed or hopeless 0   PHQ-2 Score 0   Q1: Little interest or pleasure in doing things Not at all   Q2: Feeling down, depressed or hopeless Not at all   PHQ-2 Score 0           10/21/2024   Substance Use   Alcohol more than 3/day or more than 7/wk No   Do you have a current opioid prescription? No   How severe/bad is pain from 1 to 10? 0/10 (No Pain)   Do you use any other substances recreationally? No      Social History     Tobacco Use    Smoking status: Never    Smokeless tobacco: Never   Vaping Use    Vaping status: Never Used   Substance Use Topics    Alcohol use: Not Currently     Alcohol/week: 0.0 standard drinks of alcohol     Comment: 1 beer on Weekends    Drug use: No           10/21/2024   AAA Screening   Family history of Abdominal Aortic Aneurysm (AAA)? No      Last PSA:   PSA   Date Value Ref Range Status   02/03/2021 3.12 0 - 4 ug/L Final     Comment:     Assay Method:  Chemiluminescence using Siemens Vista analyzer     Prostate Specific Antigen Screen   Date  "Value Ref Range Status   07/20/2022 2.59 0.00 - 4.00 ug/L Final     ASCVD Risk   The ASCVD Risk score (Harry DK, et al., 2019) failed to calculate for the following reasons:    The valid total cholesterol range is 130 to 320 mg/dL            Reviewed and updated as needed this visit by Provider     Meds                  Current providers sharing in care for this patient include:  Patient Care Team:  Albino Venegas MD as PCP - General (Family Medicine)  Meenakshi Blue MD as MD (Hematology & Oncology)  Sindi Viramontes RN as Nurse Coordinator (Hematology & Oncology)  Albino Venegas MD as Assigned PCP    The following health maintenance items are reviewed in Epic and correct as of today:  Health Maintenance   Topic Date Due    RSV VACCINE (1 - Risk 60-74 years 1-dose series) Never done    BMP  08/23/2024    LIPID  08/23/2024    MICROALBUMIN  08/23/2024    DIABETIC FOOT EXAM  08/23/2024    ANNUAL REVIEW OF HM ORDERS  04/08/2025    EYE EXAM  04/12/2025    A1C  04/22/2025    MEDICARE ANNUAL WELLNESS VISIT  10/22/2025    FALL RISK ASSESSMENT  10/22/2025    DTAP/TDAP/TD IMMUNIZATION (2 - Td or Tdap) 12/30/2025    COLORECTAL CANCER SCREENING  06/02/2027    ADVANCE CARE PLANNING  10/22/2029    HEPATITIS C SCREENING  Completed    PHQ-2 (once per calendar year)  Completed    INFLUENZA VACCINE  Completed    Pneumococcal Vaccine: 65+ Years  Completed    ZOSTER IMMUNIZATION  Completed    COVID-19 Vaccine  Completed    HPV IMMUNIZATION  Aged Out    MENINGITIS IMMUNIZATION  Aged Out    RSV MONOCLONAL ANTIBODY  Aged Out            Objective    Exam  /74   Pulse 73   Temp 97.3  F (36.3  C) (Temporal)   Resp 14   Ht 1.72 m (5' 7.7\")   Wt 70 kg (154 lb 6.4 oz)   SpO2 98%   BMI 23.68 kg/m     Estimated body mass index is 23.68 kg/m  as calculated from the following:    Height as of this encounter: 1.72 m (5' 7.7\").    Weight as of this encounter: 70 kg (154 lb 6.4 oz).    Physical " Exam  GENERAL: alert and no distress  EYES: Eyes grossly normal to inspection, PERRL and conjunctivae and sclerae normal  HENT: ear canals and TM's normal, nose and mouth without ulcers or lesions  NECK: no adenopathy, no asymmetry, masses, or scars  RESP: lungs clear to auscultation - no rales, rhonchi or wheezes  CV: regular rate and rhythm, normal S1 S2, no S3 or S4, no murmur, click or rub, no peripheral edema  ABDOMEN: soft, nontender, no hepatosplenomegaly, no masses and bowel sounds normal  MS: no gross musculoskeletal defects noted, no edema  SKIN: no suspicious lesions or rashes  NEURO: Normal strength and tone, mentation intact and speech normal  PSYCH: mentation appears normal, affect normal/bright         10/22/2024   Mini Cog   Clock Draw Score 2 Normal   3 Item Recall 3 objects recalled   Mini Cog Total Score 5                 Signed Electronically by: Albino Venegas MD, MD

## 2025-02-19 DIAGNOSIS — I10 ESSENTIAL HYPERTENSION: ICD-10-CM

## 2025-02-19 DIAGNOSIS — E11.9 TYPE 2 DIABETES MELLITUS WITHOUT COMPLICATION, WITHOUT LONG-TERM CURRENT USE OF INSULIN (H): ICD-10-CM

## 2025-02-19 RX ORDER — METFORMIN HYDROCHLORIDE 500 MG/1
2000 TABLET, EXTENDED RELEASE ORAL
Qty: 360 TABLET | Refills: 1 | Status: SHIPPED | OUTPATIENT
Start: 2025-02-19

## 2025-02-19 RX ORDER — LISINOPRIL 10 MG/1
10 TABLET ORAL DAILY
Qty: 90 TABLET | Refills: 1 | Status: SHIPPED | OUTPATIENT
Start: 2025-02-19

## 2025-02-19 RX ORDER — SIMVASTATIN 10 MG
10 TABLET ORAL AT BEDTIME
Qty: 90 TABLET | Refills: 1 | Status: SHIPPED | OUTPATIENT
Start: 2025-02-19

## 2025-03-24 ENCOUNTER — PATIENT OUTREACH (OUTPATIENT)
Dept: CARE COORDINATION | Facility: CLINIC | Age: 70
End: 2025-03-24
Payer: COMMERCIAL

## 2025-04-26 ENCOUNTER — HEALTH MAINTENANCE LETTER (OUTPATIENT)
Age: 70
End: 2025-04-26

## 2025-05-01 ENCOUNTER — MYC REFILL (OUTPATIENT)
Dept: FAMILY MEDICINE | Facility: CLINIC | Age: 70
End: 2025-05-01
Payer: COMMERCIAL

## 2025-05-01 DIAGNOSIS — I10 ESSENTIAL HYPERTENSION: ICD-10-CM

## 2025-05-01 DIAGNOSIS — E11.9 TYPE 2 DIABETES MELLITUS WITHOUT COMPLICATION, WITHOUT LONG-TERM CURRENT USE OF INSULIN (H): ICD-10-CM

## 2025-05-01 RX ORDER — METFORMIN HYDROCHLORIDE 500 MG/1
2000 TABLET, EXTENDED RELEASE ORAL
Qty: 360 TABLET | Refills: 1 | Status: CANCELLED | OUTPATIENT
Start: 2025-05-01

## 2025-05-01 RX ORDER — LISINOPRIL 10 MG/1
10 TABLET ORAL DAILY
Qty: 90 TABLET | Refills: 0 | Status: SHIPPED | OUTPATIENT
Start: 2025-05-01

## 2025-05-01 RX ORDER — LISINOPRIL 10 MG/1
10 TABLET ORAL DAILY
Qty: 90 TABLET | Refills: 1 | Status: CANCELLED | OUTPATIENT
Start: 2025-05-01

## 2025-05-01 RX ORDER — SIMVASTATIN 10 MG
10 TABLET ORAL AT BEDTIME
Qty: 90 TABLET | Refills: 1 | Status: CANCELLED | OUTPATIENT
Start: 2025-05-01

## 2025-05-01 RX ORDER — SIMVASTATIN 10 MG
10 TABLET ORAL AT BEDTIME
Qty: 90 TABLET | Refills: 0 | Status: SHIPPED | OUTPATIENT
Start: 2025-05-01

## 2025-05-01 RX ORDER — METFORMIN HYDROCHLORIDE 500 MG/1
2000 TABLET, EXTENDED RELEASE ORAL
Qty: 360 TABLET | Refills: 0 | Status: SHIPPED | OUTPATIENT
Start: 2025-05-01

## 2025-06-11 ENCOUNTER — OFFICE VISIT (OUTPATIENT)
Dept: FAMILY MEDICINE | Facility: CLINIC | Age: 70
End: 2025-06-11
Payer: COMMERCIAL

## 2025-06-11 ENCOUNTER — RESULTS FOLLOW-UP (OUTPATIENT)
Dept: FAMILY MEDICINE | Facility: CLINIC | Age: 70
End: 2025-06-11

## 2025-06-11 VITALS
SYSTOLIC BLOOD PRESSURE: 122 MMHG | TEMPERATURE: 97.7 F | HEIGHT: 68 IN | HEART RATE: 85 BPM | BODY MASS INDEX: 22.73 KG/M2 | DIASTOLIC BLOOD PRESSURE: 70 MMHG | RESPIRATION RATE: 21 BRPM | WEIGHT: 150 LBS | OXYGEN SATURATION: 98 %

## 2025-06-11 DIAGNOSIS — C18.7 MALIGNANT NEOPLASM OF SIGMOID COLON (H): ICD-10-CM

## 2025-06-11 DIAGNOSIS — I10 ESSENTIAL HYPERTENSION: ICD-10-CM

## 2025-06-11 DIAGNOSIS — E11.9 TYPE 2 DIABETES MELLITUS WITHOUT COMPLICATION, WITHOUT LONG-TERM CURRENT USE OF INSULIN (H): Primary | ICD-10-CM

## 2025-06-11 LAB
EST. AVERAGE GLUCOSE BLD GHB EST-MCNC: 166 MG/DL
HBA1C MFR BLD: 7.4 % (ref 0–5.6)

## 2025-06-11 PROCEDURE — 83036 HEMOGLOBIN GLYCOSYLATED A1C: CPT | Performed by: FAMILY MEDICINE

## 2025-06-11 PROCEDURE — 36415 COLL VENOUS BLD VENIPUNCTURE: CPT | Performed by: FAMILY MEDICINE

## 2025-06-11 RX ORDER — METFORMIN HYDROCHLORIDE 500 MG/1
2000 TABLET, EXTENDED RELEASE ORAL
Qty: 360 TABLET | Refills: 1 | Status: SHIPPED | OUTPATIENT
Start: 2025-06-11

## 2025-06-11 RX ORDER — SIMVASTATIN 10 MG
10 TABLET ORAL AT BEDTIME
Qty: 90 TABLET | Refills: 1 | Status: SHIPPED | OUTPATIENT
Start: 2025-06-11

## 2025-06-11 RX ORDER — LISINOPRIL 10 MG/1
10 TABLET ORAL DAILY
Qty: 90 TABLET | Refills: 1 | Status: SHIPPED | OUTPATIENT
Start: 2025-06-11

## 2025-06-11 NOTE — PROGRESS NOTES
Assessment & Plan     Type 2 diabetes mellitus without complication, without long-term current use of insulin (H)  Mild worsening of A1c with lifestyle changes.  Going to improve his lifestyle.  No change in Rx.  - Hemoglobin A1c; Future  - Hemoglobin A1c  - FOOT EXAM  - metFORMIN (GLUCOPHAGE XR) 500 MG 24 hr tablet; Take 4 tablets (2,000 mg) by mouth daily (with dinner).  - simvastatin (ZOCOR) 10 MG tablet; Take 1 tablet (10 mg) by mouth at bedtime.    Essential hypertension  Patient is tolerating current medication without any major side effects of concerns and current dose seems reasonable too.  Current medication regime is effective. Continue current treatment without any changes.   - lisinopril (ZESTRIL) 10 MG tablet; Take 1 tablet (10 mg) by mouth daily.    Malignant neoplasm of sigmoid colon (H)  History of sigmoid colon cancer.  Been to oncology.  No further follow-up recommended.  Needed every few years CEA.  We have not obtained CEA in few years.  Will obtain with the next labs.    The longitudinal plan of care for the diagnosis(es)/condition(s) as documented were addressed during this visit. Due to the added complexity in care, I will continue to support Cipriano in the subsequent management and with ongoing continuity of care.            Subjective   Cipriano is a 69 year old, presenting for the following health issues:  Diabetes        6/11/2025     7:04 AM   Additional Questions   Roomed by Aleksandra WESTBROOK MA       Via the Health Maintenance questionnaire, the patient has reported the following services have been completed -Eye Exam: [facility] 2024-04-12, this information has been sent to the abstraction team.  Was in [place] and left diabetic supplies down there. 3 weeks without checking Blood Sugar    History of Present Illness       Diabetes:   He presents for follow up of diabetes.  He is checking home blood glucose a few times a month.   He checks blood glucose before meals.  Blood glucose is never  "over 200 and never under 70.  When his blood glucose is low, the patient is asymptomatic for confusion, blurred vision, lethargy and reports not feeling dizzy, shaky, or weak.   He has no concerns regarding his diabetes at this time.   He is not experiencing numbness or burning in feet, excessive thirst, blurry vision, weight changes or redness, sores or blisters on feet. The patient has not had a diabetic eye exam in the last 12 months.          He eats 2-3 servings of fruits and vegetables daily.He consumes 0 sweetened beverage(s) daily.He exercises with enough effort to increase his heart rate 10 to 19 minutes per day.  He exercises with enough effort to increase his heart rate 3 or less days per week.   He is taking medications regularly.        Wife had a fall and fracture. He has been cooking. He was helping her out.   Thinking to end his long yearly trips.     2000mg metformin per day. Some loose stool once in a while.    Sometime feet numbness with lying down. None during activities.     Been to oncologist - no longer needs follow up.           Objective    /70 (BP Location: Right arm, Patient Position: Chair, Cuff Size: Adult Regular)   Pulse 85   Temp 97.7  F (36.5  C) (Oral)   Resp 21   Ht 1.727 m (5' 8\")   Wt 68 kg (150 lb)   SpO2 98%   BMI 22.81 kg/m    Body mass index is 22.81 kg/m .  Physical Exam     Diabetic foot exam: normal DP and PT pulses, no trophic changes or ulcerative lesions, normal sensory exam, and normal monofilament exam. Both great toe nails deformity.     Signed Electronically by: Albino Venegas MD    "